# Patient Record
Sex: MALE | Race: WHITE | URBAN - METROPOLITAN AREA
[De-identification: names, ages, dates, MRNs, and addresses within clinical notes are randomized per-mention and may not be internally consistent; named-entity substitution may affect disease eponyms.]

---

## 2017-01-01 ENCOUNTER — HOSPITAL ENCOUNTER (OUTPATIENT)
Dept: CT IMAGING | Facility: CLINIC | Age: 77
Discharge: HOME | End: 2017-11-14
Attending: INTERNAL MEDICINE | Admitting: INTERNAL MEDICINE
Payer: COMMERCIAL

## 2017-01-01 ENCOUNTER — HOSPITAL ENCOUNTER (OUTPATIENT)
Facility: CLINIC | Age: 77
Setting detail: SPECIMEN
Discharge: HOME | End: 2017-11-15
Attending: INTERNAL MEDICINE | Admitting: INTERNAL MEDICINE
Payer: COMMERCIAL

## 2017-01-01 ENCOUNTER — ONCOLOGY VISIT (OUTPATIENT)
Dept: ONCOLOGY | Facility: CLINIC | Age: 77
End: 2017-01-01
Attending: INTERNAL MEDICINE
Payer: COMMERCIAL

## 2017-01-01 ENCOUNTER — OFFICE VISIT (OUTPATIENT)
Dept: FAMILY MEDICINE | Facility: CLINIC | Age: 77
End: 2017-01-01
Payer: COMMERCIAL

## 2017-01-01 ENCOUNTER — OFFICE VISIT (OUTPATIENT)
Dept: NEUROLOGY | Facility: CLINIC | Age: 77
End: 2017-01-01
Payer: COMMERCIAL

## 2017-01-01 ENCOUNTER — TELEPHONE (OUTPATIENT)
Dept: ONCOLOGY | Facility: CLINIC | Age: 77
End: 2017-01-01

## 2017-01-01 ENCOUNTER — TELEPHONE (OUTPATIENT)
Dept: NEUROLOGY | Facility: CLINIC | Age: 77
End: 2017-01-01

## 2017-01-01 ENCOUNTER — TELEPHONE (OUTPATIENT)
Dept: FAMILY MEDICINE | Facility: CLINIC | Age: 77
End: 2017-01-01

## 2017-01-01 VITALS
SYSTOLIC BLOOD PRESSURE: 101 MMHG | BODY MASS INDEX: 24.77 KG/M2 | WEIGHT: 193 LBS | DIASTOLIC BLOOD PRESSURE: 68 MMHG | HEART RATE: 75 BPM | HEIGHT: 74 IN | TEMPERATURE: 95.2 F | OXYGEN SATURATION: 99 %

## 2017-01-01 VITALS
WEIGHT: 196 LBS | TEMPERATURE: 97.1 F | HEART RATE: 95 BPM | OXYGEN SATURATION: 98 % | HEIGHT: 74 IN | DIASTOLIC BLOOD PRESSURE: 74 MMHG | BODY MASS INDEX: 25.15 KG/M2 | SYSTOLIC BLOOD PRESSURE: 113 MMHG

## 2017-01-01 VITALS
WEIGHT: 192.3 LBS | BODY MASS INDEX: 24.68 KG/M2 | HEART RATE: 103 BPM | TEMPERATURE: 97.5 F | HEIGHT: 74 IN | SYSTOLIC BLOOD PRESSURE: 80 MMHG | OXYGEN SATURATION: 98 % | DIASTOLIC BLOOD PRESSURE: 50 MMHG

## 2017-01-01 VITALS
DIASTOLIC BLOOD PRESSURE: 80 MMHG | WEIGHT: 193 LBS | BODY MASS INDEX: 24.77 KG/M2 | HEART RATE: 89 BPM | OXYGEN SATURATION: 96 % | HEIGHT: 74 IN | TEMPERATURE: 98.5 F | SYSTOLIC BLOOD PRESSURE: 118 MMHG

## 2017-01-01 VITALS
TEMPERATURE: 97.7 F | OXYGEN SATURATION: 96 % | BODY MASS INDEX: 25.28 KG/M2 | HEART RATE: 92 BPM | WEIGHT: 197 LBS | DIASTOLIC BLOOD PRESSURE: 72 MMHG | SYSTOLIC BLOOD PRESSURE: 113 MMHG | HEIGHT: 74 IN

## 2017-01-01 VITALS
HEART RATE: 91 BPM | WEIGHT: 193 LBS | OXYGEN SATURATION: 97 % | BODY MASS INDEX: 24.78 KG/M2 | SYSTOLIC BLOOD PRESSURE: 136 MMHG | RESPIRATION RATE: 18 BRPM | TEMPERATURE: 97.8 F | DIASTOLIC BLOOD PRESSURE: 89 MMHG

## 2017-01-01 VITALS
DIASTOLIC BLOOD PRESSURE: 82 MMHG | BODY MASS INDEX: 25.28 KG/M2 | WEIGHT: 197 LBS | HEART RATE: 92 BPM | SYSTOLIC BLOOD PRESSURE: 120 MMHG | TEMPERATURE: 97.9 F | HEIGHT: 74 IN | RESPIRATION RATE: 18 BRPM | OXYGEN SATURATION: 99 %

## 2017-01-01 DIAGNOSIS — G62.9 PERIPHERAL POLYNEUROPATHY: Primary | ICD-10-CM

## 2017-01-01 DIAGNOSIS — C90.00 MULTIPLE MYELOMA, REMISSION STATUS UNSPECIFIED (H): Primary | ICD-10-CM

## 2017-01-01 DIAGNOSIS — I10 ESSENTIAL HYPERTENSION, BENIGN: ICD-10-CM

## 2017-01-01 DIAGNOSIS — G60.9 IDIOPATHIC PERIPHERAL NEUROPATHY: ICD-10-CM

## 2017-01-01 DIAGNOSIS — D47.2 MGUS (MONOCLONAL GAMMOPATHY OF UNKNOWN SIGNIFICANCE): ICD-10-CM

## 2017-01-01 DIAGNOSIS — R63.0 DECREASED APPETITE: ICD-10-CM

## 2017-01-01 DIAGNOSIS — M79.672 BILATERAL FOOT PAIN: ICD-10-CM

## 2017-01-01 DIAGNOSIS — D64.9 ANEMIA, UNSPECIFIED TYPE: ICD-10-CM

## 2017-01-01 DIAGNOSIS — R70.0 ELEVATED ERYTHROCYTE SEDIMENTATION RATE: Primary | ICD-10-CM

## 2017-01-01 DIAGNOSIS — N40.0 PROSTATE ENLARGEMENT: ICD-10-CM

## 2017-01-01 DIAGNOSIS — E78.2 MIXED HYPERLIPIDEMIA: ICD-10-CM

## 2017-01-01 DIAGNOSIS — R97.20 ELEVATED PROSTATE SPECIFIC ANTIGEN (PSA): ICD-10-CM

## 2017-01-01 DIAGNOSIS — D64.9 ANEMIA, UNSPECIFIED TYPE: Primary | ICD-10-CM

## 2017-01-01 DIAGNOSIS — R20.0 NUMBNESS IN FEET: Primary | ICD-10-CM

## 2017-01-01 DIAGNOSIS — E53.1 VITAMIN B6 DEFICIENCY: ICD-10-CM

## 2017-01-01 DIAGNOSIS — D47.2 MGUS (MONOCLONAL GAMMOPATHY OF UNKNOWN SIGNIFICANCE): Primary | ICD-10-CM

## 2017-01-01 DIAGNOSIS — M79.671 BILATERAL FOOT PAIN: ICD-10-CM

## 2017-01-01 DIAGNOSIS — I95.2 HYPOTENSION DUE TO DRUGS: ICD-10-CM

## 2017-01-01 DIAGNOSIS — N18.30 CKD (CHRONIC KIDNEY DISEASE) STAGE 3, GFR 30-59 ML/MIN (H): ICD-10-CM

## 2017-01-01 DIAGNOSIS — R70.0 ELEVATED ERYTHROCYTE SEDIMENTATION RATE: ICD-10-CM

## 2017-01-01 DIAGNOSIS — F33.9 EPISODE OF RECURRENT MAJOR DEPRESSIVE DISORDER, UNSPECIFIED DEPRESSION EPISODE SEVERITY (H): Primary | ICD-10-CM

## 2017-01-01 DIAGNOSIS — F33.9 EPISODE OF RECURRENT MAJOR DEPRESSIVE DISORDER, UNSPECIFIED DEPRESSION EPISODE SEVERITY (H): ICD-10-CM

## 2017-01-01 DIAGNOSIS — R53.82 CHRONIC FATIGUE: Primary | ICD-10-CM

## 2017-01-01 LAB
A-TOCOPHEROL VIT E SERPL-MCNC: 9.4 MG/L (ref 5.5–18)
ALBUMIN MFR UR ELPH: 58.2 %
ALBUMIN SERPL ELPH-MCNC: 3.9 G/DL (ref 3.7–5.1)
ALBUMIN SERPL-MCNC: 2.8 G/DL (ref 3.4–5)
ALP SERPL-CCNC: 63 U/L (ref 40–150)
ALPHA1 GLOB MFR UR ELPH: 3.1 %
ALPHA1 GLOB SERPL ELPH-MCNC: 0.3 G/DL (ref 0.2–0.4)
ALPHA2 GLOB MFR UR ELPH: 4.7 %
ALPHA2 GLOB SERPL ELPH-MCNC: 0.8 G/DL (ref 0.5–0.9)
ALT SERPL W P-5'-P-CCNC: 50 U/L (ref 0–70)
ANA SER QL IF: NEGATIVE
ANION GAP SERPL CALCULATED.3IONS-SCNC: 9 MMOL/L (ref 3–14)
AST SERPL W P-5'-P-CCNC: 19 U/L (ref 0–45)
B BURGDOR IGG+IGM SER QL: <0.01 (ref 0–0.89)
B-GLOBULIN MFR UR ELPH: 4.9 %
B-GLOBULIN SERPL ELPH-MCNC: 0.6 G/DL (ref 0.6–1)
BASOPHILS # BLD AUTO: 0 10E9/L (ref 0–0.2)
BASOPHILS # BLD AUTO: 0 10E9/L (ref 0–0.2)
BASOPHILS NFR BLD AUTO: 0 %
BASOPHILS NFR BLD AUTO: 0.2 %
BETA+GAMMA TOCOPHEROL SERPL-MCNC: 0.5 MG/L (ref 0–6)
BILIRUB SERPL-MCNC: 0.4 MG/DL (ref 0.2–1.3)
BUN SERPL-MCNC: 33 MG/DL (ref 7–30)
CALCIUM SERPL-MCNC: 9.3 MG/DL (ref 8.5–10.1)
CHLORIDE SERPL-SCNC: 105 MMOL/L (ref 94–109)
CO2 SERPL-SCNC: 23 MMOL/L (ref 20–32)
CREAT SERPL-MCNC: 1.45 MG/DL (ref 0.66–1.25)
CREAT SERPL-MCNC: 1.69 MG/DL (ref 0.66–1.25)
DIFFERENTIAL METHOD BLD: ABNORMAL
DIFFERENTIAL METHOD BLD: ABNORMAL
EOSINOPHIL # BLD AUTO: 0.2 10E9/L (ref 0–0.7)
EOSINOPHIL # BLD AUTO: 0.2 10E9/L (ref 0–0.7)
EOSINOPHIL NFR BLD AUTO: 3.7 %
EOSINOPHIL NFR BLD AUTO: 3.9 %
ERYTHROCYTE [DISTWIDTH] IN BLOOD BY AUTOMATED COUNT: 13.5 % (ref 10–15)
ERYTHROCYTE [DISTWIDTH] IN BLOOD BY AUTOMATED COUNT: 13.7 % (ref 10–15)
ERYTHROCYTE [SEDIMENTATION RATE] IN BLOOD BY WESTERGREN METHOD: 83 MM/H (ref 0–20)
FERRITIN SERPL-MCNC: 237 NG/ML (ref 26–388)
FOLATE SERPL-MCNC: 15.5 NG/ML
GAMMA GLOB MFR UR ELPH: 29.1 %
GAMMA GLOB SERPL ELPH-MCNC: 4.2 G/DL (ref 0.7–1.6)
GFR SERPL CREATININE-BSD FRML MDRD: 39 ML/MIN/1.7M2
GFR SERPL CREATININE-BSD FRML MDRD: 47 ML/MIN/1.7M2
GLUCOSE SERPL-MCNC: 121 MG/DL (ref 70–99)
HAPTOGLOB SERPL-MCNC: 146 MG/DL (ref 35–175)
HBA1C MFR BLD: 5.9 % (ref 4.3–6)
HCT VFR BLD AUTO: 34.7 % (ref 40–53)
HCT VFR BLD AUTO: 35 % (ref 40–53)
HGB BLD-MCNC: 11.6 G/DL (ref 13.3–17.7)
HGB BLD-MCNC: 11.7 G/DL (ref 13.3–17.7)
IGA SERPL-MCNC: 7 MG/DL (ref 70–380)
IGG SERPL-MCNC: 5110 MG/DL (ref 695–1620)
IGM SERPL-MCNC: 6 MG/DL (ref 60–265)
IRON SATN MFR SERPL: 30 % (ref 15–46)
IRON SERPL-MCNC: 78 UG/DL (ref 35–180)
KAPPA LC UR-MCNC: 0.4 MG/DL (ref 0.33–1.94)
KAPPA LC/LAMBDA SER: 0.04 {RATIO} (ref 0.26–1.65)
LAMBDA LC SERPL-MCNC: 9.25 MG/DL (ref 0.57–2.63)
LDH SERPL L TO P-CCNC: 145 U/L (ref 85–227)
LYMPHOCYTES # BLD AUTO: 1.3 10E9/L (ref 0.8–5.3)
LYMPHOCYTES # BLD AUTO: 1.8 10E9/L (ref 0.8–5.3)
LYMPHOCYTES NFR BLD AUTO: 27.6 %
LYMPHOCYTES NFR BLD AUTO: 40.1 %
M PROTEIN MFR UR ELPH: 21 %
M PROTEIN SERPL ELPH-MCNC: 4 G/DL
MCH RBC QN AUTO: 30.9 PG (ref 26.5–33)
MCH RBC QN AUTO: 31.1 PG (ref 26.5–33)
MCHC RBC AUTO-ENTMCNC: 33.4 G/DL (ref 31.5–36.5)
MCHC RBC AUTO-ENTMCNC: 33.4 G/DL (ref 31.5–36.5)
MCV RBC AUTO: 92 FL (ref 78–100)
MCV RBC AUTO: 93 FL (ref 78–100)
MONOCYTES # BLD AUTO: 0.3 10E9/L (ref 0–1.3)
MONOCYTES # BLD AUTO: 0.3 10E9/L (ref 0–1.3)
MONOCYTES NFR BLD AUTO: 5.9 %
MONOCYTES NFR BLD AUTO: 6.4 %
NEUTROPHILS # BLD AUTO: 2.3 10E9/L (ref 1.6–8.3)
NEUTROPHILS # BLD AUTO: 3 10E9/L (ref 1.6–8.3)
NEUTROPHILS NFR BLD AUTO: 50.1 %
NEUTROPHILS NFR BLD AUTO: 62.1 %
PLATELET # BLD AUTO: 126 10E9/L (ref 150–450)
PLATELET # BLD AUTO: 141 10E9/L (ref 150–450)
POTASSIUM SERPL-SCNC: 3.6 MMOL/L (ref 3.4–5.3)
PROT ELPH PNL UR ELPH: NORMAL
PROT PATTERN SERPL ELPH-IMP: ABNORMAL
PROT PATTERN SERPL IFE-IMP: ABNORMAL
PROT PATTERN UR ELPH-IMP: ABNORMAL
PROT SERPL-MCNC: 10.2 G/DL (ref 6.8–8.8)
PSA SERPL-MCNC: 10.4 UG/L (ref 0–4)
RBC # BLD AUTO: 3.73 10E12/L (ref 4.4–5.9)
RBC # BLD AUTO: 3.79 10E12/L (ref 4.4–5.9)
SODIUM SERPL-SCNC: 137 MMOL/L (ref 133–144)
TIBC SERPL-MCNC: 262 UG/DL (ref 240–430)
TSH SERPL DL<=0.005 MIU/L-ACNC: 1.56 MU/L (ref 0.4–4)
VIT B1 BLD-MCNC: 106 NMOL/L (ref 70–180)
VIT B12 SERPL-MCNC: 1378 PG/ML (ref 193–986)
VIT B6 SERPL-MCNC: 14.9 NMOL/L (ref 20–125)
WBC # BLD AUTO: 4.6 10E9/L (ref 4–11)
WBC # BLD AUTO: 4.9 10E9/L (ref 4–11)

## 2017-01-01 PROCEDURE — 99000 SPECIMEN HANDLING OFFICE-LAB: CPT | Performed by: PSYCHIATRY & NEUROLOGY

## 2017-01-01 PROCEDURE — 84425 ASSAY OF VITAMIN B-1: CPT | Mod: 90 | Performed by: PSYCHIATRY & NEUROLOGY

## 2017-01-01 PROCEDURE — 84165 PROTEIN E-PHORESIS SERUM: CPT | Performed by: PSYCHIATRY & NEUROLOGY

## 2017-01-01 PROCEDURE — 82728 ASSAY OF FERRITIN: CPT | Performed by: NURSE PRACTITIONER

## 2017-01-01 PROCEDURE — 84446 ASSAY OF VITAMIN E: CPT | Mod: 90 | Performed by: PSYCHIATRY & NEUROLOGY

## 2017-01-01 PROCEDURE — 84166 PROTEIN E-PHORESIS/URINE/CSF: CPT | Performed by: PSYCHIATRY & NEUROLOGY

## 2017-01-01 PROCEDURE — 85652 RBC SED RATE AUTOMATED: CPT | Performed by: PSYCHIATRY & NEUROLOGY

## 2017-01-01 PROCEDURE — 99211 OFF/OP EST MAY X REQ PHY/QHP: CPT

## 2017-01-01 PROCEDURE — 36415 COLL VENOUS BLD VENIPUNCTURE: CPT

## 2017-01-01 PROCEDURE — 82565 ASSAY OF CREATININE: CPT | Performed by: INTERNAL MEDICINE

## 2017-01-01 PROCEDURE — 99214 OFFICE O/P EST MOD 30 MIN: CPT | Performed by: INTERNAL MEDICINE

## 2017-01-01 PROCEDURE — 84153 ASSAY OF PSA TOTAL: CPT | Performed by: INTERNAL MEDICINE

## 2017-01-01 PROCEDURE — 84207 ASSAY OF VITAMIN B-6: CPT | Mod: 90 | Performed by: PSYCHIATRY & NEUROLOGY

## 2017-01-01 PROCEDURE — 85025 COMPLETE CBC W/AUTO DIFF WBC: CPT | Performed by: INTERNAL MEDICINE

## 2017-01-01 PROCEDURE — 82784 ASSAY IGA/IGD/IGG/IGM EACH: CPT | Performed by: PSYCHIATRY & NEUROLOGY

## 2017-01-01 PROCEDURE — 80053 COMPREHEN METABOLIC PANEL: CPT | Performed by: NURSE PRACTITIONER

## 2017-01-01 PROCEDURE — 99204 OFFICE O/P NEW MOD 45 MIN: CPT | Performed by: INTERNAL MEDICINE

## 2017-01-01 PROCEDURE — 83010 ASSAY OF HAPTOGLOBIN QUANT: CPT | Performed by: NURSE PRACTITIONER

## 2017-01-01 PROCEDURE — 86618 LYME DISEASE ANTIBODY: CPT | Performed by: PSYCHIATRY & NEUROLOGY

## 2017-01-01 PROCEDURE — 00000402 ZZHCL STATISTIC TOTAL PROTEIN: Performed by: PSYCHIATRY & NEUROLOGY

## 2017-01-01 PROCEDURE — 84443 ASSAY THYROID STIM HORMONE: CPT | Performed by: INTERNAL MEDICINE

## 2017-01-01 PROCEDURE — 36415 COLL VENOUS BLD VENIPUNCTURE: CPT | Performed by: INTERNAL MEDICINE

## 2017-01-01 PROCEDURE — 86335 IMMUNFIX E-PHORSIS/URINE/CSF: CPT | Performed by: PSYCHIATRY & NEUROLOGY

## 2017-01-01 PROCEDURE — 83550 IRON BINDING TEST: CPT | Performed by: NURSE PRACTITIONER

## 2017-01-01 PROCEDURE — 86038 ANTINUCLEAR ANTIBODIES: CPT | Performed by: PSYCHIATRY & NEUROLOGY

## 2017-01-01 PROCEDURE — 83883 ASSAY NEPHELOMETRY NOT SPEC: CPT | Performed by: INTERNAL MEDICINE

## 2017-01-01 PROCEDURE — 83615 LACTATE (LD) (LDH) ENZYME: CPT | Performed by: NURSE PRACTITIONER

## 2017-01-01 PROCEDURE — 74176 CT ABD & PELVIS W/O CONTRAST: CPT

## 2017-01-01 PROCEDURE — 86334 IMMUNOFIX E-PHORESIS SERUM: CPT | Performed by: PSYCHIATRY & NEUROLOGY

## 2017-01-01 PROCEDURE — 82746 ASSAY OF FOLIC ACID SERUM: CPT | Performed by: INTERNAL MEDICINE

## 2017-01-01 PROCEDURE — 36415 COLL VENOUS BLD VENIPUNCTURE: CPT | Performed by: NURSE PRACTITIONER

## 2017-01-01 PROCEDURE — 99214 OFFICE O/P EST MOD 30 MIN: CPT | Performed by: NURSE PRACTITIONER

## 2017-01-01 PROCEDURE — 85025 COMPLETE CBC W/AUTO DIFF WBC: CPT | Performed by: NURSE PRACTITIONER

## 2017-01-01 PROCEDURE — 99205 OFFICE O/P NEW HI 60 MIN: CPT | Performed by: PSYCHIATRY & NEUROLOGY

## 2017-01-01 PROCEDURE — 82607 VITAMIN B-12: CPT | Performed by: INTERNAL MEDICINE

## 2017-01-01 PROCEDURE — 36415 COLL VENOUS BLD VENIPUNCTURE: CPT | Performed by: PSYCHIATRY & NEUROLOGY

## 2017-01-01 PROCEDURE — 83036 HEMOGLOBIN GLYCOSYLATED A1C: CPT | Performed by: PSYCHIATRY & NEUROLOGY

## 2017-01-01 PROCEDURE — 83540 ASSAY OF IRON: CPT | Performed by: NURSE PRACTITIONER

## 2017-01-01 RX ORDER — MIRTAZAPINE 30 MG/1
30 TABLET, FILM COATED ORAL AT BEDTIME
Qty: 90 TABLET | Refills: 3 | Status: SHIPPED | OUTPATIENT
Start: 2017-01-01 | End: 2017-01-01

## 2017-01-01 RX ORDER — LOSARTAN POTASSIUM 25 MG/1
25 TABLET ORAL DAILY
Qty: 30 TABLET | Refills: 11
Start: 2017-01-01 | End: 2017-01-01

## 2017-01-01 ASSESSMENT — PAIN SCALES - GENERAL: PAINLEVEL: MODERATE PAIN (5)

## 2017-01-01 ASSESSMENT — PATIENT HEALTH QUESTIONNAIRE - PHQ9: SUM OF ALL RESPONSES TO PHQ QUESTIONS 1-9: 15

## 2017-02-15 ENCOUNTER — DOCUMENTATION ONLY (OUTPATIENT)
Dept: CARDIOLOGY | Facility: CLINIC | Age: 77
End: 2017-02-15

## 2017-02-15 NOTE — LETTER
February 15, 2017       TO: Guille Aguilar  [address]  [place] [address]       Dear Guille Aguilar,    We are reviewing our outstanding orders.    Dr. Washington has ordered an office visit and lab work with his assistant for follow up.  You missed your appointment last week.     Please contact the scheduling desk at [phone] to arranged for an appointment.     If you have any questions, please call the Team 2 nurse phone @ [phone]. If you had your lab work done at another facility, please give us a call so we can locate the results.     Thank you  Team 2 nurses

## 2017-02-27 ENCOUNTER — OFFICE VISIT (OUTPATIENT)
Dept: FAMILY MEDICINE | Facility: CLINIC | Age: 77
End: 2017-02-27
Payer: COMMERCIAL

## 2017-02-27 VITALS
DIASTOLIC BLOOD PRESSURE: 64 MMHG | HEIGHT: 74 IN | OXYGEN SATURATION: 99 % | BODY MASS INDEX: 28.23 KG/M2 | HEART RATE: 76 BPM | SYSTOLIC BLOOD PRESSURE: 118 MMHG | WEIGHT: 220 LBS | TEMPERATURE: 97.7 F

## 2017-02-27 DIAGNOSIS — H69.93 ETD (EUSTACHIAN TUBE DYSFUNCTION), BILATERAL: ICD-10-CM

## 2017-02-27 DIAGNOSIS — M79.671 PAIN IN BOTH FEET: ICD-10-CM

## 2017-02-27 DIAGNOSIS — H91.93 HEARING DECREASED, BILATERAL: Primary | ICD-10-CM

## 2017-02-27 DIAGNOSIS — M79.672 PAIN IN BOTH FEET: ICD-10-CM

## 2017-02-27 PROCEDURE — 99214 OFFICE O/P EST MOD 30 MIN: CPT | Performed by: FAMILY MEDICINE

## 2017-02-27 RX ORDER — FLUTICASONE PROPIONATE 50 MCG
1-2 SPRAY, SUSPENSION (ML) NASAL DAILY
Qty: 16 G | Status: SHIPPED | OUTPATIENT
Start: 2017-02-27 | End: 2017-01-01

## 2017-02-27 NOTE — NURSING NOTE
"Chief Complaint   Patient presents with     Ear Problem       Initial /64  Pulse 76  Temp 97.7  F (36.5  C) (Tympanic)  Ht 6' 2\" (1.88 m)  Wt 220 lb (99.8 kg)  SpO2 99%  BMI 28.25 kg/m2 Estimated body mass index is 28.25 kg/(m^2) as calculated from the following:    Height as of this encounter: 6' 2\" (1.88 m).    Weight as of this encounter: 220 lb (99.8 kg).  Medication Reconciliation: complete     Lidya Shah CMA      "

## 2017-02-27 NOTE — MR AVS SNAPSHOT
After Visit Summary   2/27/2017    Guille Aguilar    MRN: [id number]           Patient Information     Date Of Birth          1940        Visit Information        Provider Department      2/27/2017 1:30 PM Zuleyka Wick MD [facility] [place]        Today's Diagnoses     Hearing decreased, bilateral    -  1    ETD (eustachian tube dysfunction), bilateral        Pain in both feet          Care Instructions    Cares and symptomatic treatment discussed follow up if problem   Referral given to ENT.   Also may go back to see podiatrist if any ongoing feet problem               Follow-ups after your visit        Additional Services     OTOLARYNGOLOGY REFERRAL       Your provider has referred you to: UM: [facility] [place] [phone]   [email or web address]  UMP: [facility] [phone]   [email or web address]  N: [facility] [place] [phone]   [email or web address]    Please be aware that coverage of these services is subject to the terms and limitations of your health insurance plan.  Call member services at your health plan with any benefit or coverage questions.      Please bring the following with you to your appointment:    (1) Any X-Rays, CTs or MRIs which have been performed.  Contact the facility where they were done to arrange for  prior to your scheduled appointment.   (2) List of current medications  (3) This referral request   (4) Any documents/labs given to you for this referral                  Who to contact     If you have questions or need follow up information about today's clinic visit or your schedule please contact [facility] [place] directly at [phone].  Normal or non-critical lab  "and imaging results will be communicated to you by MyChart, letter or phone within 4 business days after the clinic has received the results. If you do not hear from us within 7 days, please contact the clinic through [employer] or phone. If you have a critical or abnormal lab result, we will notify you by phone as soon as possible.  Submit refill requests through App[employer] or call your pharmacy and they will forward the refill request to us. Please allow 3 business days for your refill to be completed.          Additional Information About Your Visit        [employer] Information     AppSense lets you send messages to your doctor, view your test results, renew your prescriptions, schedule appointments and more. To sign up, go to [email or web address] . Click on \"Log in\" on the left side of the screen, which will take you to the Welcome page. Then click on \"Sign up Now\" on the right side of the page.     You will be asked to enter the access code listed below, as well as some personal information. Please follow the directions to create your username and password.     Your access code is: [email or web address]  Expires: 2017  2:02 PM     Your access code will  in 90 days. If you need help or a new code, please call your [place] clinic or [phone].        Care EveryWhere ID     This is your Care EveryWhere ID. This could be used by other organizations to access your [place] medical records  [id number]        Your Vitals Were     Pulse Temperature Height Pulse Oximetry BMI (Body Mass Index)       76 97.7  F (36.5  C) (Tympanic) 6' 2\" (1.88 m) 99% 28.25 kg/m2        Blood Pressure from Last 3 Encounters:   17 118/64   16 128/84   10/04/16 131/84    Weight from Last 3 Encounters:   17 220 lb (99.8 kg)   16 228 lb (103.4 kg)   10/04/16 232 lb 3.2 oz (105.3 kg)              We Performed the Following     OTOLARYNGOLOGY REFERRAL          Today's Medication Changes          These changes are " accurate as of: 2/27/17  2:02 PM.  If you have any questions, ask your nurse or doctor.               Start taking these medicines.        Dose/Directions    fluticasone 50 MCG/ACT spray   Commonly known as:  FLONASE   Used for:  ETD (eustachian tube dysfunction), bilateral, Hearing decreased, bilateral   Started by:  Zuleyka Wick MD        Dose:  1-2 spray   Spray 1-2 sprays into both nostrils daily   Quantity:  16 g   Refills:  prn            Where to get your medicines      These medications were sent to [facility] - [place], [place] - [address] AT [address]  [address] [address]     Phone:  [phone]     fluticasone 50 MCG/ACT spray                Primary Care Provider Office Phone # Fax #    Magdaleno Cleveland [phone]       [facility] [address]  [place] [address]        Thank you!     Thank you for choosing [facility]  for your care. Our goal is always to provide you with excellent care. Hearing back from our patients is one way we can continue to improve our services. Please take a few minutes to complete the written survey that you may receive in the mail after your visit with us. Thank you!             Your Updated Medication List - Protect others around you: Learn how to safely use, store and throw away your medicines at www.disposemymeds.org.          This list is accurate as of: 2/27/17  2:02 PM.  Always use your most recent med list.                   Brand Name Dispense Instructions for use    aspirin 81 MG tablet      1 TABLET DAILY       atorvastatin 80 MG tablet    LIPITOR    90 tablet    Take 1 tablet (80 mg) by mouth At Bedtime       coenzyme Q-10 100 MG Caps      Take by mouth daily       fluticasone 50 MCG/ACT spray    FLONASE    16 g    Spray 1-2 sprays into both nostrils daily       hydrochlorothiazide 25 MG tablet    HYDRODIURIL    45  tablet    Take 0.5 tablets (12.5 mg) by mouth daily       losartan 50 MG tablet    COZAAR    180 tablet    Take 1 tablet (50 mg) by mouth 2 times daily       metoprolol 50 MG tablet    LOPRESSOR    180 tablet    Take 1 tablet (50 mg) by mouth 2 times daily       NITRO-DUR 0.4 MG/HR 24 hr patch   Generic drug:  nitroglycerin      Place 1 patch onto the skin as needed.       XALATAN 0.005 % ophthalmic solution   Generic drug:  latanoprost      1 drop left eye once daily

## 2017-02-27 NOTE — PROGRESS NOTES
"  SUBJECTIVE:                                                    Guille Aguilar is a 76 year old male who presents to clinic today for the following health issues:      Concern - ears plugged     Onset: x 3 days    Description:     Not painful, can't hear, 'sounds liek wind blowing no ringing \"    Intensity: mild    Progression of Symptoms:  Same     Accompanying Signs & Symptoms: no other uri sx , except  just ears feel plugged, hearing feel muffled just last few days          Previous history of similar problem:   No     Precipitating factors:   Worsened by: none     Alleviating factors:  Improved by: na        Therapies Tried and outcome:       Joint or Musculoskeletal Pain.   Feet/ ankle , feeling pain coming back lately   Has seen podiatrist and therapist in the past it helped some  , but sometimes feels sharp pain back of heel goes to leg, no numbness or tingling. He takes aspirin sometimes and it helps         Problem list and histories reviewed & adjusted, as indicated.  Additional history: as documented    Problem list, Medication list, Allergies, and Medical/Social/Surgical histories reviewed in EPIC and updated as appropriate.    ROS:  Constitutional, HEENT, cardiovascular, pulmonary, GI, , musculoskeletal, neuro, skin, endocrine and psych systems are negative, except as otherwise noted.    OBJECTIVE:                                                    /64  Pulse 76  Temp 97.7  F (36.5  C) (Tympanic)  Ht 6' 2\" (1.88 m)  Wt 220 lb (99.8 kg)  SpO2 99%  BMI 28.25 kg/m2  Body mass index is 28.25 kg/(m^2).  GENERAL: healthy, alert and no distress  EYES: Eyes grossly normal to inspection,conjunctivae and sclerae normal  HENT: ear canals and TM's normal, nasal minimally swollen turbinates and clear  rhinorrhea . No sinus tenderness. oropharynx clear and oral mucous membranes moist  NECK: no adenopathy,  RESP: lungs clear to auscultation - no rales, rhonchi or wheezes  CV: regular rate and rhythm, normal " S1 S2, no S3 or S4,   MS: feet / ankle with normal range of motion, no edema and tenderness to palpation along achillis or medial arch at this time   NEURO: Normal strength and tone, mentation intact and speech normal       ASSESSMENT/PLAN:                                                        (H91.93) Hearing decreased, bilateral  (primary encounter diagnosis)  Comment: only past few days , ? ringing likely ETD   Plan: fluticasone (FLONASE) 50 MCG/ACT spray,         OTOLARYNGOLOGY REFERRAL            (H69.83) ETD (eustachian tube dysfunction), bilateral  Comment:   Plan: fluticasone (FLONASE) 50 MCG/ACT spray,         OTOLARYNGOLOGY REFERRAL        Discussed possible differential diagnosis for his symptoms. He is willing to try Flonase to see if helps. If no improvement or ongoing problem, he will consider seeing ENT. Referral given . F/u here as needed       (M79.671,  M79.672) Pain in both feet  Comment: previous hx of planter faucitis/ achillis strain   Plan:   Discussed feet cares. Talked about comfortable shoes, orthotics to support etc. He is willing to try just OTC med's for pain although reminded him not to take too much aspirin bc of gi s/e risk etc. Pros/ cons of med's discussed,  if ongoing problem, he follow up with his podiatry, as needed. He will follow up here as needed        Patient expressed understanding and agreement with treatment plan. All patient's questions were answered, will let me know if has more later.  Medications: Rx's: Reviewed the potential side effects/complications of medications prescribed.       Zuleyka Wick MD  [facility]

## 2017-02-27 NOTE — PATIENT INSTRUCTIONS
Cares and symptomatic treatment discussed follow up if problem   Referral given to ENT.   Also may go back to see podiatrist if any ongoing feet problem

## 2017-04-10 ENCOUNTER — TELEPHONE (OUTPATIENT)
Dept: OTHER | Facility: CLINIC | Age: 77
End: 2017-04-10

## 2017-04-10 ENCOUNTER — TELEPHONE (OUTPATIENT)
Dept: CARDIOLOGY | Facility: CLINIC | Age: 77
End: 2017-04-10

## 2017-04-10 NOTE — TELEPHONE ENCOUNTER
"Patient called requesting an OV with Dr. Washington for annual review and medication refills. Patient states he missed his OV in Feb with Dr. Washington. Patient also wants to discuss \"neuropathy on his right leg\". Patient transferred to scheduling for next available OV .   "

## 2017-05-09 ENCOUNTER — PRE VISIT (OUTPATIENT)
Dept: CARDIOLOGY | Facility: CLINIC | Age: 77
End: 2017-05-09

## 2017-05-26 DIAGNOSIS — I25.10 CORONARY ARTERY DISEASE INVOLVING NATIVE CORONARY ARTERY OF NATIVE HEART WITHOUT ANGINA PECTORIS: ICD-10-CM

## 2017-05-26 LAB
ANION GAP SERPL CALCULATED.3IONS-SCNC: 7 MMOL/L (ref 3–14)
BUN SERPL-MCNC: 27 MG/DL (ref 7–30)
CALCIUM SERPL-MCNC: 8.6 MG/DL (ref 8.5–10.1)
CHLORIDE SERPL-SCNC: 105 MMOL/L (ref 94–109)
CHOLEST SERPL-MCNC: 181 MG/DL
CO2 SERPL-SCNC: 25 MMOL/L (ref 20–32)
CREAT SERPL-MCNC: 1.16 MG/DL (ref 0.66–1.25)
GFR SERPL CREATININE-BSD FRML MDRD: 61 ML/MIN/1.7M2
GLUCOSE SERPL-MCNC: 95 MG/DL (ref 70–99)
HDLC SERPL-MCNC: 26 MG/DL
LDLC SERPL CALC-MCNC: 118 MG/DL
NONHDLC SERPL-MCNC: 155 MG/DL
POTASSIUM SERPL-SCNC: 4.1 MMOL/L (ref 3.4–5.3)
SODIUM SERPL-SCNC: 137 MMOL/L (ref 133–144)
TRIGL SERPL-MCNC: 185 MG/DL

## 2017-05-26 PROCEDURE — 80061 LIPID PANEL: CPT | Performed by: INTERNAL MEDICINE

## 2017-05-26 PROCEDURE — 36415 COLL VENOUS BLD VENIPUNCTURE: CPT | Performed by: INTERNAL MEDICINE

## 2017-05-26 PROCEDURE — 80048 BASIC METABOLIC PNL TOTAL CA: CPT | Performed by: INTERNAL MEDICINE

## 2017-05-30 ENCOUNTER — OFFICE VISIT (OUTPATIENT)
Dept: CARDIOLOGY | Facility: CLINIC | Age: 77
End: 2017-05-30
Payer: COMMERCIAL

## 2017-05-30 VITALS
DIASTOLIC BLOOD PRESSURE: 86 MMHG | SYSTOLIC BLOOD PRESSURE: 148 MMHG | HEIGHT: 74 IN | HEART RATE: 76 BPM | BODY MASS INDEX: 26.69 KG/M2 | WEIGHT: 208 LBS

## 2017-05-30 DIAGNOSIS — M79.672 PAIN IN BOTH FEET: ICD-10-CM

## 2017-05-30 DIAGNOSIS — Z91.148 NONCOMPLIANCE WITH MEDICATION REGIMEN: ICD-10-CM

## 2017-05-30 DIAGNOSIS — E78.6 HDL DEFICIENCY: Chronic | ICD-10-CM

## 2017-05-30 DIAGNOSIS — I10 ESSENTIAL HYPERTENSION, BENIGN: ICD-10-CM

## 2017-05-30 DIAGNOSIS — M79.671 PAIN IN BOTH FEET: ICD-10-CM

## 2017-05-30 DIAGNOSIS — I25.10 CORONARY ARTERY DISEASE INVOLVING NATIVE CORONARY ARTERY OF NATIVE HEART WITHOUT ANGINA PECTORIS: Primary | ICD-10-CM

## 2017-05-30 DIAGNOSIS — E78.2 MIXED HYPERLIPIDEMIA: ICD-10-CM

## 2017-05-30 PROCEDURE — 99214 OFFICE O/P EST MOD 30 MIN: CPT | Performed by: INTERNAL MEDICINE

## 2017-05-30 RX ORDER — METOPROLOL SUCCINATE 100 MG/1
100 TABLET, EXTENDED RELEASE ORAL DAILY
Qty: 90 TABLET | Refills: 3 | Status: SHIPPED | OUTPATIENT
Start: 2017-05-30 | End: 2017-01-01

## 2017-05-30 RX ORDER — ROSUVASTATIN CALCIUM 40 MG/1
40 TABLET, COATED ORAL DAILY
Qty: 90 TABLET | Refills: 3 | Status: SHIPPED | OUTPATIENT
Start: 2017-05-30 | End: 2017-01-01

## 2017-05-30 RX ORDER — LOSARTAN POTASSIUM 100 MG/1
100 TABLET ORAL DAILY
Qty: 90 TABLET | Refills: 3 | Status: SHIPPED | OUTPATIENT
Start: 2017-05-30 | End: 2017-01-01

## 2017-05-30 RX ORDER — HYDROCHLOROTHIAZIDE 25 MG/1
12.5 TABLET ORAL DAILY
Qty: 45 TABLET | Refills: 3 | Status: SHIPPED | OUTPATIENT
Start: 2017-05-30 | End: 2017-01-01

## 2017-05-30 RX ORDER — ROSUVASTATIN CALCIUM 40 MG/1
40 TABLET, COATED ORAL DAILY
Qty: 90 TABLET | Refills: 3 | Status: SHIPPED | OUTPATIENT
Start: 2017-05-30 | End: 2017-05-30

## 2017-05-30 RX ORDER — LOSARTAN POTASSIUM 100 MG/1
100 TABLET ORAL DAILY
Qty: 90 TABLET | Refills: 3 | Status: SHIPPED | OUTPATIENT
Start: 2017-05-30 | End: 2017-05-30

## 2017-05-30 RX ORDER — HYDROCHLOROTHIAZIDE 25 MG/1
12.5 TABLET ORAL DAILY
Qty: 45 TABLET | Refills: 3 | Status: SHIPPED | OUTPATIENT
Start: 2017-05-30 | End: 2017-05-30

## 2017-05-30 RX ORDER — METOPROLOL SUCCINATE 100 MG/1
100 TABLET, EXTENDED RELEASE ORAL DAILY
Qty: 90 TABLET | Refills: 3 | Status: SHIPPED | OUTPATIENT
Start: 2017-05-30 | End: 2017-05-30

## 2017-05-30 NOTE — MR AVS SNAPSHOT
After Visit Summary   5/30/2017    Guille Aguilar    MRN: [id number]           Patient Information     Date Of Birth          1940        Visit Information        Provider Department      5/30/2017 3:45 PM Monster Washington MD [facility]        Today's Diagnoses     Coronary artery disease involving native coronary artery of native heart without angina pectoris    -  1    BENIGN HYPERTENSION        Essential hypertension, benign        Mixed hyperlipidemia        Pain in both feet        Noncompliance with medication regimen        HDL deficiency           Follow-ups after your visit        Additional Services     Follow-Up with Cardiac Advanced Practice Provider           Follow-Up with Cardiologist                 Your next 10 appointments already scheduled     Jun 29, 2017  7:30 AM CDT   LAB with [facility]   [facility]    [address] [place] [id number] [phone]           Patient must bring picture ID.  Patient should be prepared to give a urine specimen  Please do not eat 10-12 hours before your appointment if you are coming in fasting for labs on lipids, cholesterol, or glucose (sugar).  Pregnant women should follow their Care Team instructions. Water with medications is okay. Do not drink coffee or other fluids.   If you have concerns about taking  your medications, please ask at office or if scheduling via Rubik[employer]oud, send a message by clicking on Secure Messaging, Message Your Care Team.              Future tests that were ordered for you today     Open Future Orders        Priority Expected Expires Ordered    Basic metabolic panel Routine 5/30/2018 7/4/2018 5/30/2017    Lipid Profile Routine 5/30/2018 7/4/2018 5/30/2017    Follow-Up with Cardiac Advanced Practice Provider Routine 5/30/2018 10/12/2018 5/30/2017    Basic metabolic panel Routine 5/30/2019  "2019    Lipid Profile Routine 2019    Follow-Up with Cardiologist Routine 2019    Basic metabolic panel Routine 2017    Lipid Profile Routine 2017    ALT Routine 2017            Who to contact     If you have questions or need follow up information about today's clinic visit or your schedule please contact [facility] [place] directly at [phone].  Normal or non-critical lab and imaging results will be communicated to you by MyChart, letter or phone within 4 business days after the clinic has received the results. If you do not hear from us within 7 days, please contact the clinic through [employer] or phone. If you have a critical or abnormal lab result, we will notify you by phone as soon as possible.  Submit refill requests through [employer] or call your pharmacy and they will forward the refill request to us. Please allow 3 business days for your refill to be completed.          Additional Information About Your Visit        MyChart Information     [employer] lets you send messages to your doctor, view your test results, renew your prescriptions, schedule appointments and more. To sign up, go to [email or web address] . Click on \"Log in\" on the left side of the screen, which will take you to the Welcome page. Then click on \"Sign up Now\" on the right side of the page.     You will be asked to enter the access code listed below, as well as some personal information. Please follow the directions to create your username and password.     Your access code is: [email or web address]  Expires: 2017  5:08 PM     Your access code will  in 90 days. If you need help or a new code, please call your [place] clinic or [phone].        Care EveryWhere ID     This is your Care EveryWhere ID. This could be used by other organizations to access your " "[place] medical records  [id number]        Your Vitals Were     Pulse Height BMI (Body Mass Index)             76 1.88 m (6' 2\") 26.71 kg/m2          Blood Pressure from Last 3 Encounters:   05/30/17 148/86   02/27/17 118/64   11/08/16 128/84    Weight from Last 3 Encounters:   05/30/17 94.3 kg (208 lb)   02/27/17 99.8 kg (220 lb)   11/08/16 103.4 kg (228 lb)                 Today's Medication Changes          These changes are accurate as of: 5/30/17  5:08 PM.  If you have any questions, ask your nurse or doctor.               Start taking these medicines.        Dose/Directions    hydrochlorothiazide 25 MG tablet   Commonly known as:  HYDRODIURIL   Used for:  Essential hypertension, benign, Coronary artery disease involving native coronary artery of native heart without angina pectoris   Started by:  Monster Washington MD        Dose:  12.5 mg   Take 0.5 tablets (12.5 mg) by mouth daily   Quantity:  45 tablet   Refills:  3       losartan 100 MG tablet   Commonly known as:  COZAAR   Used for:  Essential hypertension, benign   Started by:  Monster Washington MD        Dose:  100 mg   Take 1 tablet (100 mg) by mouth daily   Quantity:  90 tablet   Refills:  3       metoprolol 100 MG 24 hr tablet   Commonly known as:  TOPROL XL   Used for:  Coronary artery disease involving native coronary artery of native heart without angina pectoris   Started by:  Monster Washington MD        Dose:  100 mg   Take 1 tablet (100 mg) by mouth daily   Quantity:  90 tablet   Refills:  3       rosuvastatin 40 MG tablet   Commonly known as:  CRESTOR   Used for:  Coronary artery disease involving native coronary artery of native heart without angina pectoris   Started by:  Monster Washington MD        Dose:  40 mg   Take 1 tablet (40 mg) by mouth daily   Quantity:  90 tablet   Refills:  3         Stop taking these medicines if you haven't already. Please contact your care team if you have questions.     atorvastatin 80 MG tablet "   Commonly known as:  LIPITOR   Stopped by:  Monster Washington MD           metoprolol 50 MG tablet   Commonly known as:  LOPRESSOR   Stopped by:  Monster Washington MD                Where to get your medicines      These medications were sent to [facility] - [place], [place] - [address]  [address], [facility] [address]     Phone:  [phone]     hydrochlorothiazide 25 MG tablet    losartan 100 MG tablet    metoprolol 100 MG 24 hr tablet    rosuvastatin 40 MG tablet                Primary Care Provider Office Phone # Fax #    Magdaleno Cleveland [phone]       [facility] [place] [address] [place] [address] [address]  [place] [address]        Thank you!     Thank you for choosing [facility] [place]  for your care. Our goal is always to provide you with excellent care. Hearing back from our patients is one way we can continue to improve our services. Please take a few minutes to complete the written survey that you may receive in the mail after your visit with us. Thank you!             Your Updated Medication List - Protect others around you: Learn how to safely use, store and throw away your medicines at www.disposemymeds.org.          This list is accurate as of: 5/30/17  5:08 PM.  Always use your most recent med list.                   Brand Name Dispense Instructions for use    aspirin 81 MG tablet      1 TABLET DAILY       coenzyme Q-10 100 MG Caps      Take by mouth daily       fluticasone 50 MCG/ACT spray    FLONASE    16 g    Spray 1-2 sprays into both nostrils daily       hydrochlorothiazide 25 MG tablet    HYDRODIURIL    45 tablet    Take 0.5 tablets (12.5 mg) by mouth daily       losartan 100 MG tablet    COZAAR    90 tablet    Take 1 tablet (100 mg) by mouth daily       metoprolol 100 MG 24 hr tablet    TOPROL XL    90 tablet    Take 1 tablet (100 mg) by mouth daily       NITRO-DUR 0.4 MG/HR 24 hr patch   Generic drug:   nitroglycerin      Place 1 patch onto the skin as needed.       rosuvastatin 40 MG tablet    CRESTOR    90 tablet    Take 1 tablet (40 mg) by mouth daily       XALATAN 0.005 % ophthalmic solution   Generic drug:  latanoprost      1 drop left eye once daily

## 2017-05-30 NOTE — PROGRESS NOTES
HPI and Plan:   See dictation    Orders Placed This Encounter   Procedures     Basic metabolic panel     Lipid Profile     ALT     Basic metabolic panel     Lipid Profile     Basic metabolic panel     Lipid Profile     Follow-Up with Cardiac Advanced Practice Provider     Follow-Up with Cardiologist       Orders Placed This Encounter   Medications     DISCONTD: rosuvastatin (CRESTOR) 40 MG tablet     Sig: Take 1 tablet (40 mg) by mouth daily     Dispense:  90 tablet     Refill:  3     DISCONTD: hydrochlorothiazide (HYDRODIURIL) 25 MG tablet     Sig: Take 0.5 tablets (12.5 mg) by mouth daily     Dispense:  45 tablet     Refill:  3     DISCONTD: losartan (COZAAR) 100 MG tablet     Sig: Take 1 tablet (100 mg) by mouth daily     Dispense:  90 tablet     Refill:  3     DISCONTD: metoprolol (TOPROL XL) 100 MG 24 hr tablet     Sig: Take 1 tablet (100 mg) by mouth daily     Dispense:  90 tablet     Refill:  3     hydrochlorothiazide (HYDRODIURIL) 25 MG tablet     Sig: Take 0.5 tablets (12.5 mg) by mouth daily     Dispense:  45 tablet     Refill:  3     losartan (COZAAR) 100 MG tablet     Sig: Take 1 tablet (100 mg) by mouth daily     Dispense:  90 tablet     Refill:  3     metoprolol (TOPROL XL) 100 MG 24 hr tablet     Sig: Take 1 tablet (100 mg) by mouth daily     Dispense:  90 tablet     Refill:  3     rosuvastatin (CRESTOR) 40 MG tablet     Sig: Take 1 tablet (40 mg) by mouth daily     Dispense:  90 tablet     Refill:  3       Medications Discontinued During This Encounter   Medication Reason     atorvastatin (LIPITOR) 80 MG tablet      hydrochlorothiazide (HYDRODIURIL) 25 MG tablet Reorder     losartan (COZAAR) 50 MG tablet Reorder     metoprolol (LOPRESSOR) 50 MG tablet      hydrochlorothiazide (HYDRODIURIL) 25 MG tablet Reorder     losartan (COZAAR) 100 MG tablet Reorder     metoprolol (TOPROL XL) 100 MG 24 hr tablet Reorder     rosuvastatin (CRESTOR) 40 MG tablet Reorder         Encounter Diagnoses   Name Primary?      Coronary artery disease involving native coronary artery of native heart without angina pectoris Yes     BENIGN HYPERTENSION      Essential hypertension, benign      Mixed hyperlipidemia      Pain in both feet      Noncompliance with medication regimen        CURRENT MEDICATIONS:  Current Outpatient Prescriptions   Medication Sig Dispense Refill     hydrochlorothiazide (HYDRODIURIL) 25 MG tablet Take 0.5 tablets (12.5 mg) by mouth daily 45 tablet 3     losartan (COZAAR) 100 MG tablet Take 1 tablet (100 mg) by mouth daily 90 tablet 3     metoprolol (TOPROL XL) 100 MG 24 hr tablet Take 1 tablet (100 mg) by mouth daily 90 tablet 3     rosuvastatin (CRESTOR) 40 MG tablet Take 1 tablet (40 mg) by mouth daily 90 tablet 3     fluticasone (FLONASE) 50 MCG/ACT spray Spray 1-2 sprays into both nostrils daily 16 g prn     coenzyme Q-10 100 MG CAPS Take by mouth daily       nitroGLYCERIN (NITRO-DUR) 0.4 MG/HR Place 1 patch onto the skin as needed.       ASPIRIN 81 MG OR TABS 1 TABLET DAILY       XALATAN 0.005 % OP SOLN 1 drop left eye once daily        [DISCONTINUED] rosuvastatin (CRESTOR) 40 MG tablet Take 1 tablet (40 mg) by mouth daily 90 tablet 3     [DISCONTINUED] hydrochlorothiazide (HYDRODIURIL) 25 MG tablet Take 0.5 tablets (12.5 mg) by mouth daily 45 tablet 3     [DISCONTINUED] losartan (COZAAR) 100 MG tablet Take 1 tablet (100 mg) by mouth daily 90 tablet 3     [DISCONTINUED] metoprolol (TOPROL XL) 100 MG 24 hr tablet Take 1 tablet (100 mg) by mouth daily 90 tablet 3     [DISCONTINUED] hydrochlorothiazide (HYDRODIURIL) 25 MG tablet Take 0.5 tablets (12.5 mg) by mouth daily 45 tablet 3     [DISCONTINUED] losartan (COZAAR) 50 MG tablet Take 1 tablet (50 mg) by mouth 2 times daily (Patient taking differently: Take 50 mg by mouth daily ) 180 tablet 3       ALLERGIES     Allergies   Allergen Reactions     Dust Mites      No Known Allergies        PAST MEDICAL HISTORY:  Past Medical History:   Diagnosis Date     CAD  (coronary artery disease)     CABG 2011: LIMA to LAD, SVG to OM, SVG Y-graft to posterolateral and PDA, cardiac cath 2011: BMS to OM, cath 2006: medicalmanagment     Dental abscess      Essential hypertension, benign      Glaucoma 12/4/2009     High cholesterol 12/4/2009     Hyperlipidaemia      Left ventricular diastolic dysfunction      Non Q wave myocardial infarction (H)     2006, 2011     Obesity, unspecified        PAST SURGICAL HISTORY:  Past Surgical History:   Procedure Laterality Date     CATARACT IOL, RT/LT      left     CORONARY ARTERY BYPASS  2011    CABG 2011: LIMA to LAD, SVG to OM, SVG Y-graft to posterolateral and PDA     HEART CATH, ANGIOPLASTY      2006 OM1, unsuccessful PCI-medical management       FAMILY HISTORY:  Family History   Problem Relation Age of Onset     Other - See Comments Mother 83     old age     Other - See Comments Father      fall       SOCIAL HISTORY:  Social History     Social History     Marital status:      Spouse name: N/A     Number of children: N/A     Years of education: N/A     Social History Main Topics     Smoking status: Never Smoker     Smokeless tobacco: Never Used     Alcohol use No     Drug use: No     Sexual activity: Yes     Partners: Female     Other Topics Concern     Caffeine Concern Yes     2 cups daily of coffee     Sleep Concern No     Stress Concern No     Weight Concern No     Special Diet No     Exercise Yes     walking in the mall     Parent/Sibling W/ Cabg, Mi Or Angioplasty Before 65f 55m? Yes     Social History Narrative       Review of Systems:  Skin:  Negative       Eyes:  Positive for glasses    ENT:  Negative      Respiratory:  Positive for dyspnea on exertion     Cardiovascular:  Negative      Gastroenterology: Negative      Genitourinary:  Negative      Musculoskeletal:  Negative      Neurologic:  Negative      Psychiatric:  Negative      Heme/Lymph/Imm:  Negative      Endocrine:  Negative        Physical Exam:  Vitals: /86   "Pulse 76  Ht 1.88 m (6' 2\")  Wt 94.3 kg (208 lb)  BMI 26.71 kg/m2    Constitutional:  cooperative, alert and oriented, well developed, well nourished, in no acute distress overweight      Skin:  warm and dry to the touch, no apparent skin lesions or masses noted        Head:  normocephalic, no masses or lesions        Eyes:  pupils equal and round, conjunctivae and lids unremarkable, sclera white, no xanthalasma, EOMS intact, no nystagmus        ENT:  no pallor or cyanosis, dentition good        Neck:  no carotid bruit;carotid pulses are full and equal bilaterally        Chest:  normal breath sounds, clear to auscultation, normal A-P diameter, normal symmetry, normal respiratory excursion, no use of accessory muscles          Cardiac: regular rhythm;normal S1 and S2;no murmurs, gallops or rubs detected                  Abdomen:           Vascular: pulses full and equal                                        Extremities and Back:  no edema;no spinal abnormalities noted;normal muscle strength and tone              Neurological:  affect appropriate, oriented to time, person and place;no gross motor deficits              CC  No referring provider defined for this encounter.              "

## 2017-05-30 NOTE — LETTER
5/30/2017    Magdaleno Cleveland MD  [facility] [place]   [address] [place] [address] [address]  [place] [address]    RE: Guille Aguilar     Dear Colleague,    I had the pleasure of seeing Guille Aguilar in the [facility].      Mr. Aguilar is a very nice 77-year-old gentleman with past medical history significant for non-Q-wave myocardial infarction in 2006 at which time he had a flush occlusion of his first obtuse marginal that could not be opened.  It was well collateralized.  We treated him medically.  In 02/2011, he again presented with a non-Q-wave myocardial infarction.  The culprit stenosis this time was a distal tight posterolateral branch.  We treated with an Integrity bare-metal stent as he had multivessel coronary artery disease and ultimately went on to coronary bypass grafting x4 by Dr. Nicola Lainez in 03/2011.      Guille returns to clinic stating that he thinks his heart is doing well.  He has no chest, arm, neck, jaw or shoulder discomfort.  No dyspnea on exertion, orthopnea or PND.  No palpitations, lightheadedness, dizziness, syncope or near-syncope.  He does have a fair number of complaints.  He complains about his feet.  He also complains about the multiple doctors he had to see about his feet and how he was getting poor treatment and did not feel he was taken very seriously.  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  He did not have plantar fasciitis.  He does have peripheral neuropathy.  He asks about whether this could be an arterial vascular problem or venous circulation problem.  He is a very loquacious fellow.  He also in talking to him ultimately it is apparent that he is not taking his medications as prescribed and not taking some at all.  Initially, he states he does not know why his blood pressure is not well controlled at  this time, but in talking to him, when he does take his medications, he takes it once a day instead of twice a day and it appears he has run out of some of them.  Also he states he is suspicious about his statin.  His wife had problems with her sugars going out of control on a statin so he himself has not taken it but does not admit to this until we discuss his fasting lipid profile.      Outpatient Encounter Prescriptions as of 5/30/2017   Medication Sig Dispense Refill     hydrochlorothiazide (HYDRODIURIL) 25 MG tablet Take 0.5 tablets (12.5 mg) by mouth daily 45 tablet 3     losartan (COZAAR) 100 MG tablet Take 1 tablet (100 mg) by mouth daily 90 tablet 3     metoprolol (TOPROL XL) 100 MG 24 hr tablet Take 1 tablet (100 mg) by mouth daily 90 tablet 3     rosuvastatin (CRESTOR) 40 MG tablet Take 1 tablet (40 mg) by mouth daily 90 tablet 3     fluticasone (FLONASE) 50 MCG/ACT spray Spray 1-2 sprays into both nostrils daily 16 g prn     coenzyme Q-10 100 MG CAPS Take by mouth daily       nitroGLYCERIN (NITRO-DUR) 0.4 MG/HR Place 1 patch onto the skin as needed.       ASPIRIN 81 MG OR TABS 1 TABLET DAILY       XALATAN 0.005 % OP SOLN 1 drop left eye once daily        [DISCONTINUED] rosuvastatin (CRESTOR) 40 MG tablet Take 1 tablet (40 mg) by mouth daily 90 tablet 3     [DISCONTINUED] hydrochlorothiazide (HYDRODIURIL) 25 MG tablet Take 0.5 tablets (12.5 mg) by mouth daily 45 tablet 3     [DISCONTINUED] losartan (COZAAR) 100 MG tablet Take 1 tablet (100 mg) by mouth daily 90 tablet 3     [DISCONTINUED] metoprolol (TOPROL XL) 100 MG 24 hr tablet Take 1 tablet (100 mg) by mouth daily 90 tablet 3     [DISCONTINUED] atorvastatin (LIPITOR) 80 MG tablet Take 1 tablet (80 mg) by mouth At Bedtime 90 tablet 3     [DISCONTINUED] hydrochlorothiazide (HYDRODIURIL) 25 MG tablet Take 0.5 tablets (12.5 mg) by mouth daily 45 tablet 3     [DISCONTINUED] metoprolol (LOPRESSOR) 50 MG tablet Take 1 tablet (50 mg) by mouth 2 times daily  (Patient taking differently: Take 50 mg by mouth daily ) 180 tablet 3     [DISCONTINUED] losartan (COZAAR) 50 MG tablet Take 1 tablet (50 mg) by mouth 2 times daily (Patient taking differently: Take 50 mg by mouth daily ) 180 tablet 3     No facility-administered encounter medications on file as of 5/30/2017.      ASSESSMENT AND PLAN:     1.  Blood pressure is not well controlled at 148/86, although again I think this is medical noncompliance.  I reviewed his medications.  We are going to change them all to once-a-day medications, including losartan 100 mg once a day and Toprol 100 mg once a day.  We will also continue his hydrochlorothiazide at 12.5 mg a day.   2.  Fasting lipid profile is terrible with cholesterol of 181 up from 99, HDL is 26 and LDL is 118 up from 46, triglycerides are 185.  In reviewing his medications, we talked about the importance of consistently staying on a statin given his multivessel coronary artery disease.  I pointed out that his glucoses are actually fairly well controlled and that it dramatically decreases his risk of future events.  I will take this opportunity to switch him to rosuvastatin which would be better for his HDL, his triglycerides and LDL.  I will have him come back in 1 month and have him follow up with my TRINA.  We will see what his fasting lipid profile is and also check on his blood pressure.  I did send all his prescriptions into to [facility] at which time he then decided he wanted them sent to [facility] so I resent all his prescriptions to [facility].   3.  We talked about his foot pain.  It is clearly not a peripheral vascular problem.  He has great peripheral pulses.  He has no evidence of venous insufficiency or peripheral edema.  We talked about how his symptoms would be different.  His symptoms do sound as the neurologist has explained to him and I explained again.   4.  Weight is down at 208 pounds.  I have congratulated him on this, but it is unclear as to why his  weight has gone down.    He does not have any good explanation other than he states his appetite just has not been as good.  Hopefully, this is not a harbinger of bad things.     5.  He asks about whether I think his heart is being weakened by his foot pain, and I told him I do not think there is any relationship.      I will have him follow up on an annual basis once we get his blood pressure and cholesterol all set up.      Thank you for allowing me to participate in his care.       Sincerely,    Monster Washington MD     [facility]

## 2017-05-31 NOTE — PROGRESS NOTES
HISTORY OF PRESENT ILLNESS:  Mr. Aguilar is a very nice 77-year-old gentleman with past medical history significant for non-Q-wave myocardial infarction in 2006 at which time he had a flush occlusion of his first obtuse marginal that could not be opened.  It was well collateralized.  We treated him medically.  In 02/2011, he again presented with a non-Q-wave myocardial infarction.  The culprit stenosis this time was a distal tight posterolateral branch.  We treated with an Integrity bare-metal stent as he had multivessel coronary artery disease and ultimately went on to coronary bypass grafting x4 by Dr. Nicola Lainez in 03/2011.      Guille returns to clinic stating that he thinks his heart is doing well.  He has no chest, arm, neck, jaw or shoulder discomfort.  No dyspnea on exertion, orthopnea or PND.  No palpitations, lightheadedness, dizziness, syncope or near-syncope.  He does have a fair number of complaints.  He complains about his feet.  He also complains about the multiple doctors he had to see about his feet and how he was getting poor treatment and did not feel he was taken very seriously.  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  He did not have plantar fasciitis.  He does have peripheral neuropathy.  He asks about whether this could be an arterial vascular problem or venous circulation problem.  He is a very loquacious fellow.  He also in talking to him ultimately it is apparent that he is not taking his medications as prescribed and not taking some at all.  Initially, he states he does not know why his blood pressure is not well controlled at this time, but in talking to him, when he does take his medications, he takes it once a day instead of twice a day and it appears he has run out of some of them.  Also he states he is suspicious about his statin.  His wife  had problems with her sugars going out of control on a statin so he himself has not taken it but does not admit to this until we discuss his fasting lipid profile.      ASSESSMENT AND PLAN:     1.  Blood pressure is not well controlled at 148/86, although again I think this is medical noncompliance.  I reviewed his medications.  We are going to change them all to once-a-day medications, including losartan 100 mg once a day and Toprol 100 mg once a day.  We will also continue his hydrochlorothiazide at 12.5 mg a day.   2.  Fasting lipid profile is terrible with cholesterol of 181 up from 99, HDL is 26 and LDL is 118 up from 46, triglycerides are 185.  In reviewing his medications, we talked about the importance of consistently staying on a statin given his multivessel coronary artery disease.  I pointed out that his glucoses are actually fairly well controlled and that it dramatically decreases his risk of future events.  I will take this opportunity to switch him to rosuvastatin which would be better for his HDL, his triglycerides and LDL.  I will have him come back in 1 month and have him follow up with my TRINA.  We will see what his fasting lipid profile is and also check on his blood pressure.  I did send all his prescriptions into to [facility] at which time he then decided he wanted them sent to [facility] so I resent all his prescriptions to [facility].   3.  We talked about his foot pain.  It is clearly not a peripheral vascular problem.  He has great peripheral pulses.  He has no evidence of venous insufficiency or peripheral edema.  We talked about how his symptoms would be different.  His symptoms do sound as the neurologist has explained to him and I explained again.   4.  Weight is down at 208 pounds.  I have congratulated him on this, but it is unclear as to why his weight has gone down.  He does not have any good explanation other than he states his appetite just has not been as good.  Hopefully, this is not a  harbinger of bad things.   5.  He asks about whether I think his heart is being weakened by his foot pain, and I told him I do not think there is any relationship.      I will have him follow up on an annual basis once we get his blood pressure and cholesterol all set up.      Thank you for allowing me to participate in his care.         DEIRDRE YADAV MD, [facility]             D: 2017 17:07   T: 2017 11:41   MT: [email or web address]      Name:     ARCHIE ALDANA   MRN:      [id number]        Account:      [id number]   :      1940           Service Date: 2017      Document: [id number]

## 2017-06-28 ENCOUNTER — TELEPHONE (OUTPATIENT)
Dept: CARDIOLOGY | Facility: CLINIC | Age: 77
End: 2017-06-28

## 2017-06-28 NOTE — TELEPHONE ENCOUNTER
Message from patient, he will have his labs done at [facility]D clinic and asks to cancel his appointment for tomorrow. Message forwarded to scheduling.

## 2017-07-24 ENCOUNTER — TELEPHONE (OUTPATIENT)
Dept: CARDIOLOGY | Facility: CLINIC | Age: 77
End: 2017-07-24

## 2017-07-24 NOTE — TELEPHONE ENCOUNTER
Patient called with multiple issues. BP ranging 102-114/67 , calf and feet pain, Unsure of medications and what he's taking. ...  Advised patient to make [facility] OV to discuss. Patient states unhappiness with medical doctors and no concrete answered. Patient will think about making an OV and call back.

## 2017-10-02 NOTE — MR AVS SNAPSHOT
"              After Visit Summary   10/2/2017    Guille Aguilar    MRN: [id number]           Patient Information     Date Of Birth          1940        Visit Information        Provider Department      10/2/2017 11:00 AM Jacqueline Kerr MD [facility]        Today's Diagnoses     Chronic fatigue    -  1    Episode of recurrent major depressive disorder, unspecified depression episode severity (H)        Essential hypertension, benign           Follow-ups after your visit        Your next 10 appointments already scheduled     Nov 02, 2017  9:30 AM CDT   Office Visit with Jacqueline Kerr MD   [facility] ([facility])    [address] [facility] [id number] [phone]           Bring a current list of meds and any records pertaining to this visit. For Physicals, please bring immunization records and any forms needing to be filled out. Please arrive 10 minutes early to complete paperwork.              Who to contact     If you have questions or need follow up information about today's clinic visit or your schedule please contact [facility] directly at [phone].  Normal or non-critical lab and imaging results will be communicated to you by Kudohart, letter or phone within 4 business days after the clinic has received the results. If you do not hear from us within 7 days, please contact the clinic through [employer] or phone. If you have a critical or abnormal lab result, we will notify you by phone as soon as possible.  Submit refill requests through Verican or call your pharmacy and they will forward the refill request to us. Please allow 3 business days for your refill to be completed.          Additional Information About Your Visit        KudoharBitX Information     Verican lets you send messages to your doctor, view your test results, renew your prescriptions, schedule appointments and more. To sign up, go to [email or web address] . Click on \"Log in\" " "on the left side of the screen, which will take you to the Welcome page. Then click on \"Sign up Now\" on the right side of the page.     You will be asked to enter the access code listed below, as well as some personal information. Please follow the directions to create your username and password.     Your access code is: [email or web address]  Expires: 2017 12:32 PM     Your access code will  in 90 days. If you need help or a new code, please call your [facility] or [phone].        Care EveryWhere ID     This is your Care EveryWhere ID. This could be used by other organizations to access your [facility] medical records  [id number]        Your Vitals Were     Pulse Temperature Height Pulse Oximetry BMI (Body Mass Index)       75 95.2  F (35.1  C) (Oral) 6' 2\" (1.88 m) 99% 24.78 kg/m2        Blood Pressure from Last 3 Encounters:   10/02/17 101/68   17 148/86   17 118/64    Weight from Last 3 Encounters:   10/02/17 193 lb (87.5 kg)   17 208 lb (94.3 kg)   17 220 lb (99.8 kg)              We Performed the Following     CBC with platelets and differential     Folate     TSH with free T4 reflex     Vitamin B12          Today's Medication Changes          These changes are accurate as of: 10/2/17 12:32 PM.  If you have any questions, ask your nurse or doctor.               Start taking these medicines.        Dose/Directions    mirtazapine 30 MG tablet   Commonly known as:  REMERON   Used for:  Episode of recurrent major depressive disorder, unspecified depression episode severity (H)   Started by:  Jacqueline Kerr MD        Dose:  30 mg   Take 1 tablet (30 mg) by mouth At Bedtime   Quantity:  90 tablet   Refills:  3         These medicines have changed or have updated prescriptions.        Dose/Directions    losartan 100 MG tablet   Commonly known as:  COZAAR   This may have changed:    - how much to take  - when to take this   Used for:  Essential hypertension, benign        Dose: "  100 mg   Take 1 tablet (100 mg) by mouth daily   Quantity:  90 tablet   Refills:  3            Where to get your medicines      These medications were sent to [id number] - [place], [place] - [address] [facility] [address] [facility]  [address] [place] [place] [address]     Phone:  [phone]     mirtazapine 30 MG tablet                Primary Care Provider Office Phone # Fax #    Jacqueline Franco Kerr [phone]       [facility] [address] [place] [address] [address]  [facility] [address]        Equal Access to Services     KANDICE BERMEO : Hadii aad ku hadasho Soomaali, waaxda luqadaha, qaybta kaalmada adeegyada, waxramírez lilly . So [facility] [phone].    ATENCIÓN: Si habla español, tiene a puri disposición servicios gratuitos de asistencia lingüística. [facility] [phone].    We comply with applicable federal civil rights laws and [place] laws. We do not discriminate on the basis of race, color, national origin, age, disability, sex, sexual orientation, or gender identity.            Thank you!     Thank you for choosing [facility]  for your care. Our goal is always to provide you with excellent care. Hearing back from our patients is one way we can continue to improve our services. Please take a few minutes to complete the written survey that you may receive in the mail after your visit with us. Thank you!             Your Updated Medication List - Protect others around you: Learn how to safely use, store and throw away your medicines at www.disposemymeds.org.          This list is accurate as of: 10/2/17 12:32 PM.  Always use your most recent med list.                   Brand Name Dispense Instructions for use Diagnosis    aspirin 81 MG tablet      1 TABLET DAILY        coenzyme Q-10 100 MG Caps      Take by mouth daily        hydrochlorothiazide 25 MG tablet    HYDRODIURIL    45 tablet    Take 0.5 tablets (12.5 mg) by mouth  daily    Essential hypertension, benign, Coronary artery disease involving native coronary artery of native heart without angina pectoris       losartan 100 MG tablet    COZAAR    90 tablet    Take 1 tablet (100 mg) by mouth daily    Essential hypertension, benign       metoprolol 100 MG 24 hr tablet    TOPROL XL    90 tablet    Take 1 tablet (100 mg) by mouth daily    Coronary artery disease involving native coronary artery of native heart without angina pectoris       mirtazapine 30 MG tablet    REMERON    90 tablet    Take 1 tablet (30 mg) by mouth At Bedtime    Episode of recurrent major depressive disorder, unspecified depression episode severity (H)       NITRO-DUR 0.4 MG/HR 24 hr patch   Generic drug:  nitroGLYcerin      Place 1 patch onto the skin as needed.        rosuvastatin 40 MG tablet    CRESTOR    90 tablet    Take 1 tablet (40 mg) by mouth daily    Coronary artery disease involving native coronary artery of native heart without angina pectoris       TURMERIC PO      Take by mouth daily as needed

## 2017-10-02 NOTE — PROGRESS NOTES
Chief Complaint:     Establish care    HPI:   Patient Guille Aguilar is a very pleasant 77 year old male with history of chronic depression, chronic HTN, hyperlipidemia, and fatigue who presents to Internal Medicine clinic today to establish care and for evaluation of poorly controlled chronic fatigue and depression symptoms. Regarding the patient's HTN, the patient's BP is currently well controlled for his age. Regarding the patient's chronic depression, the patient reports that his depression symptoms are currently not well controlled. He is not currently on any anti-depressant medication and he is willing to start a new anti-depressant medication for depression control. The patient also complains of chronic fatigue for several months duration of unclear etiology. Patient is concerned about anemia and low thyroid. Patient denies any chest pain, headaches, fever or chills.         Current Medications:     Current Outpatient Prescriptions   Medication Sig Dispense Refill     TURMERIC PO Take by mouth daily as needed       mirtazapine (REMERON) 30 MG tablet Take 1 tablet (30 mg) by mouth At Bedtime 90 tablet 3     losartan (COZAAR) 100 MG tablet Take 1 tablet (100 mg) by mouth daily (Patient taking differently: Take 50 mg by mouth 2 times daily ) 90 tablet 3     metoprolol (TOPROL XL) 100 MG 24 hr tablet Take 1 tablet (100 mg) by mouth daily 90 tablet 3     rosuvastatin (CRESTOR) 40 MG tablet Take 1 tablet (40 mg) by mouth daily 90 tablet 3     coenzyme Q-10 100 MG CAPS Take by mouth daily       ASPIRIN 81 MG OR TABS 1 TABLET DAILY       hydrochlorothiazide (HYDRODIURIL) 25 MG tablet Take 0.5 tablets (12.5 mg) by mouth daily (Patient not taking: Reported on 10/2/2017) 45 tablet 3     nitroGLYCERIN (NITRO-DUR) 0.4 MG/HR Place 1 patch onto the skin as needed.           Allergies:      Allergies   Allergen Reactions     Dust Mites      No Known Allergies             Past Medical History:     Past Medical History:    Diagnosis Date     CAD (coronary artery disease)     CABG 2011: LIMA to LAD, SVG to OM, SVG Y-graft to posterolateral and PDA, cardiac cath 2011: BMS to OM, cath 2006: medicalmanagment     Dental abscess      Essential hypertension, benign      Glaucoma 12/4/2009     High cholesterol 12/4/2009     Hyperlipidaemia      Left ventricular diastolic dysfunction      Non Q wave myocardial infarction (H)     2006, 2011     Obesity, unspecified          Past Surgical History:     Past Surgical History:   Procedure Laterality Date     CATARACT IOL, RT/LT      left     CORONARY ARTERY BYPASS  2011    CABG 2011: LIMA to LAD, SVG to OM, SVG Y-graft to posterolateral and PDA     HEART CATH, ANGIOPLASTY      2006 OM1, unsuccessful PCI-medical management         Family Medical History:     Family History   Problem Relation Age of Onset     Other - See Comments Mother 83     old age     Other - See Comments Father      fall         Social History:     Social History     Social History     Marital status:      Spouse name: N/A     Number of children: N/A     Years of education: N/A     Occupational History     Not on file.     Social History Main Topics     Smoking status: Never Smoker     Smokeless tobacco: Never Used     Alcohol use No     Drug use: No     Sexual activity: Yes     Partners: Female     Other Topics Concern     Caffeine Concern Yes     2 cups daily of coffee     Sleep Concern No     Stress Concern No     Weight Concern No     Special Diet No     Exercise Yes     walking in the mall     Parent/Sibling W/ Cabg, Mi Or Angioplasty Before 65f 55m? Yes     Social History Narrative           Review of System:     Constitutional: Negative for fever or chills. Positive for chronic fatigue.  Skin: Negative for rashes  Ears/Nose/Throat: Negative for nasal congestion, sore throat  Respiratory: No shortness of breath, dyspnea on exertion, cough, or hemoptysis  Cardiovascular: Negative for chest pain  Gastrointestinal:  "Negative for nausea, vomiting  Genitourinary: Negative for dysuria, hematuria  Musculoskeletal: Negative for myalgias  Neurologic: Negative for headaches  Psychiatric: Positive for depression  Hematologic/Lymphatic/Immunologic: Negative  Endocrine: Negative  Behavioral: Negative for tobacco use       Physical Exam:   /68 (BP Location: Right arm, Patient Position: Chair, Cuff Size: Adult Regular)  Pulse 75  Temp 95.2  F (35.1  C) (Oral)  Ht 6' 2\" (1.88 m)  Wt 193 lb (87.5 kg)  SpO2 99%  BMI 24.78 kg/m2    GENERAL: chronically ill appearing elderly male, alert and no acute distress  EYES: eyes grossly normal to inspection, and conjunctivae and sclerae normal  HENT: Normocephalic atraumatic. Nose and mouth without ulcers or lesions  NECK: supple  RESP: lungs clear to auscultation   CV: regular rate and rhythm, normal S1 S2  LYMPH: no peripheral edema   ABDOMEN: nondistended  MS: no gross musculoskeletal defects noted  SKIN: no suspicious lesions or rashes  NEURO: Alert & Oriented x 3.   PSYCH: mentation appears normal, depressed affect        Diagnostic Test Results:     Diagnostic Test Results:  Results for orders placed or performed in visit on 05/26/17   Basic metabolic panel   Result Value Ref Range    Sodium 137 133 - 144 mmol/L    Potassium 4.1 3.4 - 5.3 mmol/L    Chloride 105 94 - 109 mmol/L    Carbon Dioxide 25 20 - 32 mmol/L    Anion Gap 7 3 - 14 mmol/L    Glucose 95 70 - 99 mg/dL    Urea Nitrogen 27 7 - 30 mg/dL    Creatinine 1.16 0.66 - 1.25 mg/dL    GFR Estimate 61 >60 mL/min/1.7m2    GFR Estimate If Black 74 >60 mL/min/1.7m2    Calcium 8.6 8.5 - 10.1 mg/dL   Lipid Profile   Result Value Ref Range    Cholesterol 181 <200 mg/dL    Triglycerides 185 (H) <150 mg/dL    HDL Cholesterol 26 (L) >39 mg/dL    LDL Cholesterol Calculated 118 (H) <100 mg/dL    Non HDL Cholesterol 155 (H) <130 mg/dL       ASSESSMENT/PLAN:       (R53.82) Chronic fatigue  (primary encounter diagnosis)  Comment: Patient has " chronic fatigue and is concerned about anemia and low thyroid.   Plan: I have ordered labs for CBC with platelets and differential, Vitamin B12, Folate, TSH with free T4 reflex to screen for potential causes of chronic fatigue.      (F33.9) Episode of recurrent major depressive disorder, unspecified depression episode severity (H)  Comment: Patient's depression symptoms are not currently well controlled.  Plan: I have started the patient on new anti-depressant medication with mirtazapine (REMERON) 30 MG tablet today.      (I10) Essential hypertension, benign  Comment: Patient's BP is currently well controlled.  Plan: Continue current BP medication regimen with losartan and metoprolol.        Follow Up Plan:     Patient is instructed to return to Internal Medicine clinic for follow-up visit in 1 month or sooner as needed.        Jacqueline Kerr MD  Internal Medicine  [facility]

## 2017-10-02 NOTE — LETTER
[address] [facility]  [address]  [place]  [address]  Tel: [phone]    October 5, 2017    Guille Aguilar  [address]  [place] [address]        Dear Mr. Aguilar,    Enclosed ar [employer]     If you have any further questions or problems, please contact our office.      Sincerely,    Franco Kerr MD/ Yesica HEWITT CMA  Results for orders placed or performed in visit on 10/02/17   CBC with platelets and differential   Result Value Ref Range    WBC 4.9 4.0 - 11.0 10e9/L    RBC Count 3.73 (L) 4.4 - 5.9 10e12/L    Hemoglobin 11.6 (L) 13.3 - 17.7 g/dL    Hematocrit 34.7 (L) 40.0 - 53.0 %    MCV 93 78 - 100 fl    MCH 31.1 26.5 - 33.0 pg    MCHC 33.4 31.5 - 36.5 g/dL    RDW 13.7 10.0 - 15.0 %    Platelet Count 126 (L) 150 - 450 10e9/L    Diff Method Automated Method     % Neutrophils 62.1 %    % Lymphocytes 27.6 %    % Monocytes 6.4 %    % Eosinophils 3.7 %    % Basophils 0.2 %    Absolute Neutrophil 3.0 1.6 - 8.3 10e9/L    Absolute Lymphocytes 1.3 0.8 - 5.3 10e9/L    Absolute Monocytes 0.3 0.0 - 1.3 10e9/L    Absolute Eosinophils 0.2 0.0 - 0.7 10e9/L    Absolute Basophils 0.0 0.0 - 0.2 10e9/L   Vitamin B12   Result Value Ref Range    Vitamin B12 1378 (H) 193 - 986 pg/mL   Folate   Result Value Ref Range    Folate 15.5 >5.4 ng/mL   TSH with free T4 reflex   Result Value Ref Range    TSH 1.56 0.40 - 4.00 mU/L               Enclosure: Lab Results

## 2017-10-05 NOTE — TELEPHONE ENCOUNTER
I called patient and spoke with him.   #1 Patient took first dose Remeron 2 night ago at 8:00pm. He did not fall asleep until 2:00am and slept until 9:00am.   Even after waking at 9:00am he felt very groggy for another 2 hours.     Last night he took Remeron at 6:15pm  Again didn't fall asleep until 2:00am and slept until 10:00am   Again was very groggy for another few hours. Also reports of extreme fatigue.    Regularly, patient is able to fall asleep around 9:00pm.     #2 Patient also wanting to know about recent lab results.     I advised that patient NOT take Remeron tonight due to grogginess and cause of insomnia.   Will run past Dr. Kerr if wanting to prescribe alternative or have him discuss at 11/2/17 appointment with Dr. Kerr.  Also, patient wanting to know lab results.    Thank you!    Elo Delaney RN

## 2017-10-05 NOTE — TELEPHONE ENCOUNTER
Reason for Call:  Medication or medication refill:    Do you use a [facility]?  Name of the pharmacy and phone number for the current request:       [id number] - [place], [place] - [address] AT [address]      Name of the medication requested: mirtazapine    Other request: pt has been sx of blackout w/immsomnia today since he started remron     Can we leave a detailed message on this number? YES    Phone number patient can be reached at: Home number on file [phone] (home)    Best Time: any    Call taken on 10/5/2017 at 2:52 PM by Hayder Longoria

## 2017-10-05 NOTE — TELEPHONE ENCOUNTER
Called and notified pt of lab results. Also advised pt to continue Remeron medication and take it earlier in the evening around dinner time instead.

## 2017-10-13 NOTE — TELEPHONE ENCOUNTER
"He still believes that the mirtazapine is causing him to be groggy.\" It does come and go- my sleep was ok last night.\"  I asked if he was ok to continue the mirtazapine until he sees Dr Kerr on 11/2/17 and he said yes he is ok to continue.  I asked if his mood has improved and he growled, and said he hasn't noticed any differences- he was kidding. He does know that it can take weeks to take affect.  I noted that his speech was pretty slow while conversing with him but he seemed to be a good historian when talking about his Dr's at the [facility].     He is mostly calling to let us know that his BP has been low.  He reports that he stands up carefully or he will get dizzy.    Last 2 days his BP: 88/57  Dr Kerr has readings from the past per patient.    He is almost of out of BP medication and wonders if he should fill it? Is  He does have refills with [employer].      He also has neuropathy that he has been dealing with for 2 years. Pain has lessened over the years BUT he still has \"lumpy painful feet\".  He did try massaging them and he felt a thunder bolt up his leg. He went to podiatrist and he said to go to a Neurologist. I did note that he got orthotic inserts in 2016 but they had offered injections and I don't think he followed up.   He thinks he went to [facility] about 6 months ago but I can't see visit in media.   He said the neurologist told him he is in the wrong place and said he couldn't help him.      He also has a area on his right shoulder blade- the weight of his clothes pressing on his skin is tender to touch, if you put hand pressure on it hurts even more.  He denies rash ( I explained I wanted him to look for shingles) .  He said it feels good to scratch it.  He doesn't remember how long but it has been there he states  \"a long time\".  He also has an area on his chest that is tender to touch  X one year.    Dr Kerr, He is seeing you 11/2/17  Laura Barr RN- Triage [employer]      "

## 2017-10-13 NOTE — TELEPHONE ENCOUNTER
Reason for call:  Patient reporting a symptom    Symptom or request: Pt started taking mirtazapine (REMERON) 30 MG tablet last week. Pt  Feels this medication is affecting him.    Pt is still curious about BP medication as well.    Duration (how long have symptoms been present): A week    Have you been treated for this before? Yes    Additional comments: Pt would like to be contacted to discuss.    Phone Number patient can be reached at:  Home number on file [phone] (home)    Best Time:  Anytime    Can we leave a detailed message on this number:  YES    Call taken on 10/13/2017 at 1:07 PM by Shanique Leiva

## 2017-10-17 NOTE — TELEPHONE ENCOUNTER
Reason for Call:  Request for results:    Name of test or procedure: Labs    Date of test of procedure: 10/2/2017    Location of the test or procedure: [facility] [place]    OK to leave the result message on voice mail or with a family member? NO    Additional comments: Patient walked in the Clinic on 1st floor asking to speak to a Nurse  About the results he received    Call taken on 10/17/2017 at 9:23 AM by Tashi Magana

## 2017-10-17 NOTE — PATIENT INSTRUCTIONS
Please return on Friday 10/20 at 11:30 to see Dr Kerr  He will discuss the lab tests with you at that appointment

## 2017-10-17 NOTE — PROGRESS NOTES
HPI      SUBJECTIVE:   Guille Aguilar is a 77 year old male who presents to clinic today for the following health issues:      Hypertension Follow-up      Outpatient blood pressures are being checked at home.  Results are 88/57.    Low Salt Diet: no added salt    Saw PCP a few weeks ago. Given mirtazapine for symptoms but believes it caused him to be groggy so stopped it after 10 days.  His main concern today is that he hasn't had proper f/u on his lab work (although he already has an appt scheduled with PCP to f/u on labs)  No appetite and wants to sleep all the time for at least 8 months   He reports that he stands up carefully or he will get dizzy.    Feeling depressed about his feet. He describes this as nerve issues for 2 years. He has seen multiple doctors and is very frustrated that he can't find an answer     No blood in stool, heartburn    The last month took a few B12 supplements       Past Medical History:   Diagnosis Date     CAD (coronary artery disease)     CABG 2011: LIMA to LAD, SVG to OM, SVG Y-graft to posterolateral and PDA, cardiac cath 2011: BMS to OM, cath 2006: medicalmanagment     Dental abscess      Essential hypertension, benign      Glaucoma 12/4/2009     High cholesterol 12/4/2009     Hyperlipidaemia      Left ventricular diastolic dysfunction      Non Q wave myocardial infarction (H)     2006, 2011     Obesity, unspecified      Past Surgical History:   Procedure Laterality Date     CATARACT IOL, RT/LT      left     CORONARY ARTERY BYPASS  2011    CABG 2011: LIMA to LAD, SVG to OM, SVG Y-graft to posterolateral and PDA     HEART CATH, ANGIOPLASTY      2006 OM1, unsuccessful PCI-medical management     Social History   Substance Use Topics     Smoking status: Never Smoker     Smokeless tobacco: Never Used     Alcohol use No     Current Outpatient Prescriptions   Medication Sig Dispense Refill     TURMERIC PO Take by mouth daily as needed       mirtazapine (REMERON) 30 MG tablet Take 1  "tablet (30 mg) by mouth At Bedtime 90 tablet 3     hydrochlorothiazide (HYDRODIURIL) 25 MG tablet Take 0.5 tablets (12.5 mg) by mouth daily 45 tablet 3     losartan (COZAAR) 100 MG tablet Take 1 tablet (100 mg) by mouth daily (Patient taking differently: Take 50 mg by mouth 2 times daily ) 90 tablet 3     metoprolol (TOPROL XL) 100 MG 24 hr tablet Take 1 tablet (100 mg) by mouth daily 90 tablet 3     rosuvastatin (CRESTOR) 40 MG tablet Take 1 tablet (40 mg) by mouth daily 90 tablet 3     coenzyme Q-10 100 MG CAPS Take by mouth daily       nitroGLYCERIN (NITRO-DUR) 0.4 MG/HR Place 1 patch onto the skin as needed.       ASPIRIN 81 MG OR TABS 1 TABLET DAILY       Allergies   Allergen Reactions     Dust Mites      No Known Allergies        Reviewed and updated as needed this visit by clinical staff and provider    ROS  Detailed as above       /74 (BP Location: Right arm, Cuff Size: Adult Regular)  Pulse 95  Temp 97.1  F (36.2  C) (Oral)  Ht 6' 2\" (1.88 m)  Wt 196 lb (88.9 kg)  SpO2 98%  BMI 25.16 kg/m2    Physical Exam   Constitutional: He is well-developed, well-nourished, and in no distress.   HENT:   Head: Normocephalic.   Eyes: Conjunctivae are normal.   Pulmonary/Chest: Effort normal.   Neurological: He is alert.   Skin: Skin is warm and dry. There is pallor.   Slightly pale hands   Psychiatric: Mood and affect normal.   Vitals reviewed.      Assessment and Plan:       ICD-10-CM    1. Anemia, unspecified type D64.9 CBC with platelets and differential     Ferritin     Iron and iron binding capacity     Haptoglobin     Lactate Dehydrogenase     Comprehensive metabolic panel     CANCELED: Bilirubin  total   2. Decreased appetite R63.0 Comprehensive metabolic panel       Pt walked into clinic today with concerns about his recent lab work. He was very concerned about the anemia on the report, when it is actually higher than he has been in the past.   He is very talkative and difficult to stay on track "   Considering the chronic anemia, will complete further workup as above. We made him an appt for this fri for f/u with PCP. If all labs are normal, then we may need to consider EGD, especially considering the loss of appetite. He also may need hematology referral if nothing specific shows with initial testing.   He does have a notable drop today in GFR. As this is new, the anemia is less likely d/t CKD.      He also has low plts today, slightly improved from a couple weeks ago. He had low plt and hgb in 2011, but this is when he had stents placed. Numbers improved at that time, but hgb never went to a normal range.       The total visit time was 25 minutes more  than 50% was spent in counseling and coordination of care as discussed above.      HARVEY French, CNP  [facility]

## 2017-10-17 NOTE — MR AVS SNAPSHOT
After Visit Summary   10/17/2017    Guille Aguilar    MRN: [id number]           Patient Information     Date Of Birth          1940        Visit Information        Provider Department      10/17/2017 12:00 PM Alvarado Cobb APRN CNP [facility]        Today's Diagnoses     Anemia, unspecified type    -  1    Decreased appetite          Care Instructions    Please return on Friday 10/20 at 11:30 to see Dr Kerr  He will discuss the lab tests with you at that appointment           Follow-ups after your visit        Your next 10 appointments already scheduled     Oct 17, 2017 12:00 PM CDT   Office Visit with HARVEY Humphries CNP   [facility] ([facility])    [address] [id number] [phone]           Bring a current list of meds and any records pertaining to this visit. For Physicals, please bring immunization records and any forms needing to be filled out. Please arrive 10 minutes early to complete paperwork.            Nov 02, 2017  9:30 AM CDT   Office Visit with Jacqueline Kerr MD   [facility] ([facility])    [address] [facility] [id number] [phone]           Bring a current list of meds and any records pertaining to this visit. For Physicals, please bring immunization records and any forms needing to be filled out. Please arrive 10 minutes early to complete paperwork.              Who to contact     If you have questions or need follow up information about today's clinic visit or your schedule please contact [facility] directly at [phone].  Normal or non-critical lab and imaging results will be communicated to you by MyChart, letter or phone within 4 business days after the clinic has received the results. If you do not hear from us within 7 days, please contact the clinic through MyChart or phone. If you have a critical or abnormal lab result, we will notify you  "by phone as soon as possible.  Submit refill requests through OneSun or call your pharmacy and they will forward the refill request to us. Please allow 3 business days for your refill to be completed.          Additional Information About Your Visit        GigalocalharNexess Information     OneSun lets you send messages to your doctor, view your test results, renew your prescriptions, schedule appointments and more. To sign up, go to [email or web address] . Click on \"Log in\" on the left side of the screen, which will take you to the Welcome page. Then click on \"Sign up Now\" on the right side of the page.     You will be asked to enter the access code listed below, as well as some personal information. Please follow the directions to create your username and password.     Your access code is: [email or web address]  Expires: 2017 12:32 PM     Your access code will  in 90 days. If you need help or a new code, please call your [place] clinic or [phone].        Care EveryWhere ID     This is your Care EveryWhere ID. This could be used by other organizations to access your [place] medical records  [id number]        Your Vitals Were     Pulse Temperature Height Pulse Oximetry BMI (Body Mass Index)       95 97.1  F (36.2  C) (Oral) 6' 2\" (1.88 m) 98% 25.16 kg/m2        Blood Pressure from Last 3 Encounters:   10/17/17 113/74   10/02/17 101/68   17 148/86    Weight from Last 3 Encounters:   10/17/17 196 lb (88.9 kg)   10/02/17 193 lb (87.5 kg)   17 208 lb (94.3 kg)              We Performed the Following     CBC with platelets and differential     Comprehensive metabolic panel     Ferritin     Haptoglobin     Iron and iron binding capacity     Lactate Dehydrogenase          Today's Medication Changes          These changes are accurate as of: 10/17/17 10:59 AM.  If you have any questions, ask your nurse or doctor.               These medicines have changed or have updated prescriptions.        " Dose/Directions    losartan 100 MG tablet   Commonly known as:  COZAAR   This may have changed:    - how much to take  - when to take this   Used for:  Essential hypertension, benign        Dose:  100 mg   Take 1 tablet (100 mg) by mouth daily   Quantity:  90 tablet   Refills:  3                Primary Care Provider Office Phone # Fax #    Jacqueline Franco Kerr [phone]       [address]  [facility] [address]        Equal Access to Services     YESSICA BERMEO : Hadii aad ku hadasho Soomaali, waaxda luqadaha, qaybta kaalmada adeegyada, waxay kein hayrezan neo lilly . So [facility] [phone].    ATENCIÓN: Si habla español, tiene a puri disposición servicios gratuitos de asistencia lingüística. [facility] [phone].    We comply with applicable federal civil rights laws and [place] laws. We do not discriminate on the basis of race, color, national origin, age, disability, sex, sexual orientation, or gender identity.            Thank you!     Thank you for choosing [facility]  for your care. Our goal is always to provide you with excellent care. Hearing back from our patients is one way we can continue to improve our services. Please take a few minutes to complete the written survey that you may receive in the mail after your visit with us. Thank you!             Your Updated Medication List - Protect others around you: Learn how to safely use, store and throw away your medicines at www.disposemymeds.org.          This list is accurate as of: 10/17/17 10:59 AM.  Always use your most recent med list.                   Brand Name Dispense Instructions for use Diagnosis    aspirin 81 MG tablet      1 TABLET DAILY        coenzyme Q-10 100 MG Caps      Take by mouth daily        hydrochlorothiazide 25 MG tablet    HYDRODIURIL    45 tablet    Take 0.5 tablets (12.5 mg) by mouth daily    Essential hypertension, benign, Coronary artery disease involving native coronary  artery of native heart without angina pectoris       losartan 100 MG tablet    COZAAR    90 tablet    Take 1 tablet (100 mg) by mouth daily    Essential hypertension, benign       metoprolol 100 MG 24 hr tablet    TOPROL XL    90 tablet    Take 1 tablet (100 mg) by mouth daily    Coronary artery disease involving native coronary artery of native heart without angina pectoris       mirtazapine 30 MG tablet    REMERON    90 tablet    Take 1 tablet (30 mg) by mouth At Bedtime    Episode of recurrent major depressive disorder, unspecified depression episode severity (H)       NITRO-DUR 0.4 MG/HR 24 hr patch   Generic drug:  nitroGLYcerin      Place 1 patch onto the skin as needed.        rosuvastatin 40 MG tablet    CRESTOR    90 tablet    Take 1 tablet (40 mg) by mouth daily    Coronary artery disease involving native coronary artery of native heart without angina pectoris       TURMERIC PO      Take by mouth daily as needed

## 2017-10-17 NOTE — NURSING NOTE
"Chief Complaint   Patient presents with     Hypertension     Last 2 days his BP: 88/57       Initial /74 (BP Location: Right arm, Cuff Size: Adult Regular)  Pulse 95  Temp 97.1  F (36.2  C) (Oral)  Ht 6' 2\" (1.88 m)  Wt 196 lb (88.9 kg)  SpO2 98%  BMI 25.16 kg/m2 Estimated body mass index is 25.16 kg/(m^2) as calculated from the following:    Height as of this encounter: 6' 2\" (1.88 m).    Weight as of this encounter: 196 lb (88.9 kg).  Medication Reconciliation: complete       Sana Diaz CMA        "

## 2017-10-17 NOTE — TELEPHONE ENCOUNTER
"Patient walked into clinic.  Elo (MARCIA) met with patient.    Main concerns wanting to address:    1.) Blood pressure being low. Reports that it fluctuates throughout day. See Tele encounter from 10/13/17 where he reports of BP 88/57  2.) Lab results from 10/2/17. Patient is concerned with low RBC, Hemoglobin, hematocrit, and platelet. Patient wondering next steps. Oral Iron supplement? Does not take anything at the moment.   3.)  Has had gradual weight loss over 1.5-2 years right around time when he started having nerve pain in feet  4.) Reports of episodes of dizziness and extreme fatigue of needing to sleep all the time, SOB when walking upstairs. Had episode yesterday of right leg loss of sensation when walking around the bed and also reports that sometimes he \"gets dark cloudy black spots in field of vision\"    VITALS taken 10/17/17 10:00am:  /78  HR 97  Spo2 97    Huddled with Alvarado.   Offered appt tomorrow with Dr. Kerr (PCP) or if patient willing to wait, can see Alvarado this afternoon.   Patient said he wants to get this resolved today and will wait to see Alvarado today.   Scheduled patient for 12:00pm and patient is waiting in [facility]. MA and Alvarado made aware.     Elo Delaeny RN          "

## 2017-10-20 NOTE — NURSING NOTE
"Chief Complaint   Patient presents with     Results     BP? Anemia?        Initial /72 (BP Location: Left arm, Patient Position: Chair, Cuff Size: Adult Regular)  Pulse 92  Temp 97.7  F (36.5  C) (Oral)  Ht 6' 2\" (1.88 m)  Wt 197 lb (89.4 kg)  SpO2 96%  BMI 25.29 kg/m2 Estimated body mass index is 25.29 kg/(m^2) as calculated from the following:    Height as of this encounter: 6' 2\" (1.88 m).    Weight as of this encounter: 197 lb (89.4 kg)..    BP completed using cuff size: regular  MEDICATIONS REVIEWED  SOCIAL AND FAMILY HX REVIEWED  Nancy Monteiro CMA  "

## 2017-10-20 NOTE — MR AVS SNAPSHOT
After Visit Summary   10/20/2017    Guille Aguilar    MRN: [id number]           Patient Information     Date Of Birth          1940        Visit Information        Provider Department      10/20/2017 11:30 AM Jacqueline Kerr MD [facility]        Today's Diagnoses     Episode of recurrent major depressive disorder, unspecified depression episode severity (H)    -  1    CKD (chronic kidney disease) stage 3, GFR 30-59 ml/min        Anemia, unspecified type        Essential hypertension, benign           Follow-ups after your visit        Additional Services     NEPHROLOGY ADULT REFERRAL       Your provider has referred you to: [facility]: [facility] [phone]   [email or web address]    Please be aware that coverage of these services is subject to the terms and limitations of your health insurance plan.  Call member services at your health plan with any benefit or coverage questions.      Reason for referral:  Unexplained decline in eGFR > 15 ml/min between two readings.   Please bring the following to your appointment:    >>   Any x-rays, CTs or MRIs which have been performed.  Contact the facility where they were done to arrange for  prior to your scheduled appointment.   >>   List of current medications   >>   This referral request   >>   Any documents/labs given to you for this referral                  Follow-up notes from your care team     Return in about 3 months (around 1/20/2018).      Your next 10 appointments already scheduled     Nov 02, 2017  9:30 AM CDT   Office Visit with Jacqueline Kerr MD   [facility] ([facility]    [address] [facility] [id number] [phone]           Bring a current list of meds and any records pertaining to this visit. For Physicals, please bring immunization records and any forms  "needing to be filled out. Please arrive 10 minutes early to complete paperwork.              Who to contact     If you have questions or need follow up information about today's clinic visit or your schedule please contact [facility] directly at [phone].  Normal or non-critical lab and imaging results will be communicated to you by MyChart, letter or phone within 4 business days after the clinic has received the results. If you do not hear from us within 7 days, please contact the clinic through [employer] or phone. If you have a critical or abnormal lab result, we will notify you by phone as soon as possible.  Submit refill requests through Moven or call your pharmacy and they will forward the refill request to us. Please allow 3 business days for your refill to be completed.          Additional Information About Your Visit        Nitol SolarharSecond Decimal Information     Moven lets you send messages to your doctor, view your test results, renew your prescriptions, schedule appointments and more. To sign up, go to [email or web address] . Click on \"Log in\" on the left side of the screen, which will take you to the Welcome page. Then click on \"Sign up Now\" on the right side of the page.     You will be asked to enter the access code listed below, as well as some personal information. Please follow the directions to create your username and password.     Your access code is: [email or web address]  Expires: 2017 12:32 PM     Your access code will  in 90 days. If you need help or a new code, please call your [place] clinic or [phone].        Care EveryWhere ID     This is your Care EveryWhere ID. This could be used by other organizations to access your [place] medical records  [id number]        Your Vitals Were     Pulse Temperature Height Pulse Oximetry BMI (Body Mass Index)       92 97.7  F (36.5  C) (Oral) 6' 2\" (1.88 m) 96% 25.29 kg/m2        Blood Pressure from Last 3 Encounters:   10/20/17 113/72 "   10/17/17 113/74   10/02/17 101/68    Weight from Last 3 Encounters:   10/20/17 197 lb (89.4 kg)   10/17/17 196 lb (88.9 kg)   10/02/17 193 lb (87.5 kg)              We Performed the Following     NEPHROLOGY ADULT REFERRAL          Today's Medication Changes          These changes are accurate as of: 10/20/17 11:53 AM.  If you have any questions, ask your nurse or doctor.               These medicines have changed or have updated prescriptions.        Dose/Directions    losartan 100 MG tablet   Commonly known as:  COZAAR   This may have changed:    - how much to take  - when to take this   Used for:  Essential hypertension, benign        Dose:  100 mg   Take 1 tablet (100 mg) by mouth daily   Quantity:  90 tablet   Refills:  3                Primary Care Provider Office Phone # Fax #    Jacqueline Franco Kerr [phone]       [facility] [address] [place] [address]        Equal Access to Services     YESSICA BERMEO AH: Hadii inna ku hadasho Soalonsoali, waaxda luqadaha, qaybta kaalmada adeegyada, waxay andrey lilly . So [facility] [phone].    ATENCIÓN: Si habla español, tiene a puri disposición servicios gratuitos de asistencia lingüística. Llame al [phone].    We comply with applicable federal civil rights laws and [place] laws. We do not discriminate on the basis of race, color, national origin, age, disability, sex, sexual orientation, or gender identity.            Thank you!     Thank you for choosing [facility]  for your care. Our goal is always to provide you with excellent care. Hearing back from our patients is one way we can continue to improve our services. Please take a few minutes to complete the written survey that you may receive in the mail after your visit with us. Thank you!             Your Updated Medication List - Protect others around you: Learn how to safely use, store and throw away your medicines at www.disposemymeds.org.           This list is accurate as of: 10/20/17 11:53 AM.  Always use your most recent med list.                   Brand Name Dispense Instructions for use Diagnosis    aspirin 81 MG tablet      1 TABLET DAILY        coenzyme Q-10 100 MG Caps      Take by mouth daily        hydrochlorothiazide 25 MG tablet    HYDRODIURIL    45 tablet    Take 0.5 tablets (12.5 mg) by mouth daily    Essential hypertension, benign, Coronary artery disease involving native coronary artery of native heart without angina pectoris       losartan 100 MG tablet    COZAAR    90 tablet    Take 1 tablet (100 mg) by mouth daily    Essential hypertension, benign       metoprolol 100 MG 24 hr tablet    TOPROL XL    90 tablet    Take 1 tablet (100 mg) by mouth daily    Coronary artery disease involving native coronary artery of native heart without angina pectoris       NITRO-DUR 0.4 MG/HR 24 hr patch   Generic drug:  nitroGLYcerin      Place 1 patch onto the skin as needed.        rosuvastatin 40 MG tablet    CRESTOR    90 tablet    Take 1 tablet (40 mg) by mouth daily    Coronary artery disease involving native coronary artery of native heart without angina pectoris       TURMERIC PO      Take by mouth daily as needed

## 2017-10-20 NOTE — PROGRESS NOTES
Chief Complaint:     Follow up on multiple concerns including HTN, anemia and depression    HPI:   Patient Guille Aguilar is a very pleasant 77 year old male with history of HTN, chronic mild anemia, depression who presents to Internal Medicine clinic today for follow up of multiple concerns. Regarding the patient's chronic bilateral foot pains, the patient reports that he has been taking high dose ibuprofen recently for pain relief. Patient's recent labs show worsening of his kidney function. Patient is interested in an evaluation by outpatient Nephrology specialist clinic going forward due to his worsening kidney function. Regarding the patient's depression, the patient was not able to tolerate the Remeron anti-depressant medication due to side effect of insomnia. Patient also has recent hypotension symptoms with hypotensive BP readings and fatigue on his current BP medication regimen. Patient denies any chest pain, headaches, fever or chills.           Current Medications:     Current Outpatient Prescriptions   Medication Sig Dispense Refill     losartan (COZAAR) 25 MG tablet Take 1 tablet (25 mg) by mouth daily 30 tablet 11     TURMERIC PO Take by mouth daily as needed       hydrochlorothiazide (HYDRODIURIL) 25 MG tablet Take 0.5 tablets (12.5 mg) by mouth daily 45 tablet 3     metoprolol (TOPROL XL) 100 MG 24 hr tablet Take 1 tablet (100 mg) by mouth daily 90 tablet 3     rosuvastatin (CRESTOR) 40 MG tablet Take 1 tablet (40 mg) by mouth daily 90 tablet 3     coenzyme Q-10 100 MG CAPS Take by mouth daily       nitroGLYCERIN (NITRO-DUR) 0.4 MG/HR Place 1 patch onto the skin as needed.       ASPIRIN 81 MG OR TABS 1 TABLET DAILY       [DISCONTINUED] losartan (COZAAR) 100 MG tablet Take 1 tablet (100 mg) by mouth daily (Patient taking differently: Take 50 mg by mouth 2 times daily ) 90 tablet 3         Allergies:      Allergies   Allergen Reactions     Dust Mites      No Known Allergies             Past Medical  History:     Past Medical History:   Diagnosis Date     CAD (coronary artery disease)     CABG 2011: LIMA to LAD, SVG to OM, SVG Y-graft to posterolateral and PDA, cardiac cath 2011: BMS to OM, cath 2006: medicalmanagment     Dental abscess      Essential hypertension, benign      Glaucoma 12/4/2009     High cholesterol 12/4/2009     Hyperlipidaemia      Left ventricular diastolic dysfunction      Non Q wave myocardial infarction (H)     2006, 2011     Obesity, unspecified          Past Surgical History:     Past Surgical History:   Procedure Laterality Date     CATARACT IOL, RT/LT      left     CORONARY ARTERY BYPASS  2011    CABG 2011: LIMA to LAD, SVG to OM, SVG Y-graft to posterolateral and PDA     HEART CATH, ANGIOPLASTY      2006 OM1, unsuccessful PCI-medical management         Family Medical History:     Family History   Problem Relation Age of Onset     Other - See Comments Mother 83     old age     Other - See Comments Father      fall         Social History:     Social History     Social History     Marital status:      Spouse name: N/A     Number of children: N/A     Years of education: N/A     Occupational History     Not on file.     Social History Main Topics     Smoking status: Never Smoker     Smokeless tobacco: Never Used     Alcohol use No     Drug use: No     Sexual activity: Not Currently     Partners: Female     Other Topics Concern     Caffeine Concern Yes     2 cups daily of coffee     Sleep Concern No     Stress Concern No     Weight Concern No     Special Diet No     Exercise Yes     walking in the mall     Parent/Sibling W/ Cabg, Mi Or Angioplasty Before 65f 55m? Yes     Social History Narrative           Review of System:     Constitutional: Negative for fever or chills. Positive for fatigue.  Skin: Negative for rashes  Ears/Nose/Throat: Negative for nasal congestion, sore throat  Respiratory: No shortness of breath, dyspnea on exertion, cough, or hemoptysis  Cardiovascular:  "Negative for chest pain  Gastrointestinal: Negative for nausea, vomiting  Genitourinary: Negative for dysuria, hematuria  Musculoskeletal: Positive for chronic bilateral foot pains  Neurologic: Negative for headaches.    Psychiatric: Positive for depression  Hematologic/Lymphatic/Immunologic: Negative  Endocrine: Negative  Behavioral: Negative for tobacco use       Physical Exam:   /72 (BP Location: Left arm, Patient Position: Chair, Cuff Size: Adult Regular)  Pulse 92  Temp 97.7  F (36.5  C) (Oral)  Ht 6' 2\" (1.88 m)  Wt 197 lb (89.4 kg)  SpO2 96%  BMI 25.29 kg/m2    GENERAL: chronically ill appearing elderly gentleman, alert and no acute distress  EYES: eyes grossly normal to inspection, and conjunctivae and sclerae normal  HENT: Normocephalic atraumatic. Nose and mouth without ulcers or lesions  NECK: supple  RESP: lungs clear to auscultation   CV: regular rate and rhythm, normal S1 S2  LYMPH: no peripheral edema   ABDOMEN: nondistended  MS: bilateral foot pains noted with ambulation. Patient is wearing special shoe inserts in both shoes.  SKIN: no suspicious lesions or rashes  NEURO: Alert & Oriented x 3.   PSYCH: mentation appears normal, mildly depressed affect        Diagnostic Test Results:     Diagnostic Test Results:  Results for orders placed or performed in visit on 10/17/17   CBC with platelets and differential   Result Value Ref Range    WBC 4.6 4.0 - 11.0 10e9/L    RBC Count 3.79 (L) 4.4 - 5.9 10e12/L    Hemoglobin 11.7 (L) 13.3 - 17.7 g/dL    Hematocrit 35.0 (L) 40.0 - 53.0 %    MCV 92 78 - 100 fl    MCH 30.9 26.5 - 33.0 pg    MCHC 33.4 31.5 - 36.5 g/dL    RDW 13.5 10.0 - 15.0 %    Platelet Count 141 (L) 150 - 450 10e9/L    Diff Method Automated Method     % Neutrophils 50.1 %    % Lymphocytes 40.1 %    % Monocytes 5.9 %    % Eosinophils 3.9 %    % Basophils 0.0 %    Absolute Neutrophil 2.3 1.6 - 8.3 10e9/L    Absolute Lymphocytes 1.8 0.8 - 5.3 10e9/L    Absolute Monocytes 0.3 0.0 - 1.3 " 10e9/L    Absolute Eosinophils 0.2 0.0 - 0.7 10e9/L    Absolute Basophils 0.0 0.0 - 0.2 10e9/L   Ferritin   Result Value Ref Range    Ferritin 237 26 - 388 ng/mL   Iron and iron binding capacity   Result Value Ref Range    Iron 78 35 - 180 ug/dL    Iron Binding Cap 262 240 - 430 ug/dL    Iron Saturation Index 30 15 - 46 %   Haptoglobin   Result Value Ref Range    Haptoglobin 146 35 - 175 mg/dL   Lactate Dehydrogenase   Result Value Ref Range    Lactate Dehydrogenase 145 85 - 227 U/L   Comprehensive metabolic panel   Result Value Ref Range    Sodium 137 133 - 144 mmol/L    Potassium 3.6 3.4 - 5.3 mmol/L    Chloride 105 94 - 109 mmol/L    Carbon Dioxide 23 20 - 32 mmol/L    Anion Gap 9 3 - 14 mmol/L    Glucose 121 (H) 70 - 99 mg/dL    Urea Nitrogen 33 (H) 7 - 30 mg/dL    Creatinine 1.69 (H) 0.66 - 1.25 mg/dL    GFR Estimate 39 (L) >60 mL/min/1.7m2    GFR Estimate If Black 48 (L) >60 mL/min/1.7m2    Calcium 9.3 8.5 - 10.1 mg/dL    Bilirubin Total 0.4 0.2 - 1.3 mg/dL    Albumin 2.8 (L) 3.4 - 5.0 g/dL    Protein Total 10.2 (H) 6.8 - 8.8 g/dL    Alkaline Phosphatase 63 40 - 150 U/L    ALT 50 0 - 70 U/L    AST 19 0 - 45 U/L       ASSESSMENT/PLAN:       (F33.9) Episode of recurrent major depressive disorder, unspecified depression episode severity (H)  (primary encounter diagnosis)  Comment: Patient did not tolerate the previously started Remeron anti-depressant medication due to side effect of insomnia.  Plan: I have discontinued the patient's Remeron medication. Patient also declined a referral to outpatient clinical psychology counseling at this time for further evaluation of his depression.      (N18.3) CKD (chronic kidney disease) stage 3, GFR 30-59 ml/min  Comment: Patient's kidney function has worsened recently, likely as a result of chronic high dose ibuprofen pain medication use for control of chronic bilateral foot pain.  Plan: I have discontinued the patient's ibuprofen NSAID's pain medication today. I have also  ordered NEPHROLOGY ADULT REFERRAL for further evaluation going forward.      (D64.9) Anemia, unspecified type  Comment: Patient most recent Hgb and Hct labs have shown improvement from prior baseline. Patient's iron panel also showed no signs of iron deficiency.  Plan: Patient anemia may be due to anemia of chronic disease due to kidney disease and HTN.      (I10) BENIGN HYPERTENSION  Comment: Patient's BP is mildly hypotensive currently on his current BP medication regimen. Patient is also complaining of fatigue due to his hypotension.  Plan: I have decreased the patient losartan (COZAAR) BP medication dose from 100 to 25 MG tablet once daily due his current hypotension, fatigue and dizziness.        (M79.671,  M79.672) Bilateral foot pain  (G60.9) Idiopathic peripheral neuropathy  Comment: Patient has chronic bilateral foot pain due to neuropathy of both feet. Patient declined foot xray or referral to a podiatry clinic for further evaluation of his chronic bilateral foot pains at this time.   Plan: I have also discontinued the patient's ibuprofen NSAID's pain medication today for chronic foot pains control due to worsening kidney function recently with high dose ibuprofen pain medication use. In the meantime, I have advised the patient to switch to low dose tylenol pain medication for foot pain relief going forward.        Follow Up Plan:     Patient is instructed to return to Internal Medicine clinic for follow-up visit in 1 month or sooner as needed.        Jacqueline Kerr MD  Internal Medicine  [facility]

## 2017-11-09 NOTE — TELEPHONE ENCOUNTER
Copied from OV with Dr. Kerr on 10/22/17.    M79.671,  M79.672) Bilateral foot pain  (G60.9) Idiopathic peripheral neuropathy  Comment: Patient has chronic bilateral foot pain due to neuropathy of both feet. Patient declined foot xray or referral to a podiatry clinic for further evaluation of his chronic bilateral foot pains at this time.   Plan: I have also discontinued the patient's ibuprofen NSAID's pain medication today for chronic foot pains control due to worsening kidney function recently with high dose ibuprofen pain medication use. In the meantime, I have advised the patient to switch to low dose tylenol pain medication for foot pain relief going forward.    ------   To  to advise. Do you encourage x-ray, podiatry, other?  Will call patient with your advice.  Cecilia Parham RN

## 2017-11-09 NOTE — TELEPHONE ENCOUNTER
Called and spoke to patient by phone today. Ordered neurology referral for peripheral neuropathy symptoms.

## 2017-11-10 NOTE — NURSING NOTE
"COGNITIVE SCREEN  1) Repeat 3 items (Banana, Sunrise, Chair)    2) Clock draw: NORMAL  3) 3 item recall: Recalls 3 objects  Results: 3 items recalled: COGNITIVE IMPAIRMENT LESS LIKELY    Mini-CogTM Copyright S Maryann. Licensed by the author for use in [facility]; reprinted with permission (tre@[email or web address]). All rights reserved.        Chief Complaint   Patient presents with     Consult     peripheral polyneuropathy       Initial /82 (BP Location: Left arm, Patient Position: Sitting, Cuff Size: Adult Large)  Pulse 92  Temp 97.9  F (36.6  C) (Oral)  Resp 18  Ht 1.88 m (6' 2\")  Wt 89.4 kg (197 lb)  SpO2 99%  BMI 25.29 kg/m2 Estimated body mass index is 25.29 kg/(m^2) as calculated from the following:    Height as of this encounter: 1.88 m (6' 2\").    Weight as of this encounter: 89.4 kg (197 lb).  Medication Reconciliation: irineo Plaza CMA      "

## 2017-11-10 NOTE — LETTER
"    11/10/2017         RE: Guille Aguilar  [address]  [place] [address]        Dear Colleague,    Thank you for referring your patient, Guille Aguilar, to the [facility]. Please see a copy of my visit note below.    INITIAL NEUROLOGY CONSULTATION    DATE OF VISIT: 11/10/2017  CLINIC LOCATION: [facility]  MRN: [id number]  PATIENT NAME: Guille Aguilar  YOB: 1940    PRIMARY CARE PROVIDER: Jacqueline Kerr MD.     REASON FOR VISIT:   Chief Complaint   Patient presents with     Consult     peripheral polyneuropathy     HISTORY OF PRESENT ILLNESS:                                                    Mr. Guille Aguilar is 77 year old right handed male patient with past medical history of coronary artery disease status post myocardial infarction and coronary artery bypass, hypertension, and hyperlipidemia, who was seen in consultation today requested by Jacqueline Kerr MD, for bilateral feet numbness.    Per patient's report, his symptoms of symmetric feet numbness started in 2015. It is located in both of his feet up to his ankles. It feels to him that he walks \"on rocks\". He does not trip over his feet. He denies any recent falls. He denies any associated tingling, significant pain, or focal weakness. In addition, he feels intermittent \"brief nerve zappers\" in both of his arms. Finally, since October 2017 he also feels a lot of pruritus in his back as well as a discomfort and pressure over his chest wall/skin. He did not find any aggravating factors. He feels that sleep makes his symptoms better. He tried dietary changes, supplements, and ibuprofen without much relief.    The recent labs include CMP, notable for elevated BUN at 33, creatinine at 1.69, protein at 10.2, and glucose at 121, and low albumin. Vitamin B12, TSH, folate, and iron studies were unremarkable. His CBC demonstrated low hemoglobin at 11.7 and platelet count at 141. Hemoglobin A1C was " normal in 2011.    No prior brain or spine imaging is available in our system or Care Everywhere. The patient was seen by neurology in 2011 for the right arm pain, which felt musculoskeletal.    He denies a history of recent head injury. Prior neurological history: negative for migraine headaches, stroke, brain neoplasms, seizure disorders, multiple sclerosis, meningitis, encephalitis, and major head injuries.    Neurologic Review of Systems - no amaurosis, diplopia, abnormal speech, unilateral numbness or weakness. He endorses fatigue, recent weight loss, shortness of breath, and memory problems. These complaints have already discussed with other medical providers. Otherwise, he denies any other complaints on 14-point comprehensive review of systems.    PAST MEDICAL/SURGICAL HISTORY:                                                    I personally reviewed patient's past medical and surgical history with the patient at today's visit.  Past Medical History:   Diagnosis Date     CAD (coronary artery disease)     CABG 2011: LIMA to LAD, SVG to OM, SVG Y-graft to posterolateral and PDA, cardiac cath 2011: BMS to OM, cath 2006: medicalmanagment     Dental abscess      Essential hypertension, benign      Glaucoma 12/4/2009     High cholesterol 12/4/2009     Hyperlipidaemia      Left ventricular diastolic dysfunction      Non Q wave myocardial infarction (H)     2006, 2011     Obesity, unspecified      Past Surgical History:   Procedure Laterality Date     CATARACT IOL, RT/LT      left     CORONARY ARTERY BYPASS  2011    CABG 2011: LIMA to LAD, SVG to OM, SVG Y-graft to posterolateral and PDA     HEART CATH, ANGIOPLASTY      2006 OM1, unsuccessful PCI-medical management     MEDICATIONS:                                                    I personally reviewed patient's medications and allergies with the patient at today's visit.  Current Outpatient Prescriptions on File Prior to Visit:  nitroGLYCERIN (NITRO-DUR) 0.4 MG/HR  "Place 1 patch onto the skin as needed.   ASPIRIN 81 MG OR TABS 1 TABLET DAILY   losartan (COZAAR) 25 MG tablet Take 1 tablet (25 mg) by mouth daily (Patient not taking: Reported on 11/10/2017)   hydrochlorothiazide (HYDRODIURIL) 25 MG tablet Take 0.5 tablets (12.5 mg) by mouth daily   metoprolol (TOPROL XL) 100 MG 24 hr tablet Take 1 tablet (100 mg) by mouth daily (Patient not taking: Reported on 11/10/2017)   rosuvastatin (CRESTOR) 40 MG tablet Take 1 tablet (40 mg) by mouth daily (Patient not taking: Reported on 11/10/2017)     ALLERGIES:                                                      Allergies   Allergen Reactions     Dust Mites      No Known Allergies      FAMILY/SOCIAL HISTORY:                                                    Family and social history was reviewed with the patient at today's visit. Family history is positive for stroke (mother).   Problem (# of Occurrences) Relation (Name,Age of Onset)    Other - See Comments (2) Mother (83): old age, Father: fall        , lives with his wife, Cesilia. Denies current tobacco, alcohol, and recreational drug use. Retired  of 30 years.  Social History   Substance Use Topics     Smoking status: Never Smoker     Smokeless tobacco: Never Used     Alcohol use No     REVIEW OF SYSTEMS:                                                    Patient has completed a [facility] Patient Health History, including a 14-system review which was personally reviewed, and pertinent positives are listed in HPI. He denies any additional problems on the further questioning.    EXAM:                                                    VITAL SIGNS:   /82 (BP Location: Left arm, Patient Position: Sitting, Cuff Size: Adult Large)  Pulse 92  Temp 97.9  F (36.6  C) (Oral)  Resp 18  Ht 1.88 m (6' 2\")  Wt 89.4 kg (197 lb)  SpO2 99%  BMI 25.29 kg/m2    General: pt is in NAD, cooperative.  Skin: normal turgor, moist mucous membranes, no lesions/rashes " noticed.  HEENT: ATNC, EOMI, PERRL, white sclera, normal conjunctiva, no nystagmus or ptosis. No carotid bruits bilaterally.  Respiratory: lung sounds clear to auscultation bilaterally, no crackles, wheezes, rhonchi. Symmetric lung excursion, no accessory respiratory muscle use.  Cardiovascular: normal S1/S2, no murmurs/rubs/gallops.  Abdomen: Not distended.  : deferred.    Neurological:  Mental: alert, follows commands, mini-cog is 5/5 with 3/3 on memory recall, no aphasia or dysarthria. Fund of knowledge is appropriate for age.  Cranial Nerves:  CN II: visual acuity - able to accurately count fingers with each eye. Visual fields intact, fundi: discs sharp, no papilledema and normal vessels bilaterally.  CN III, IV, VI: EOM intact, pupils equal and reactive  CN V: facial sensation nl  CN VII: face symmetric, no facial droop  CN VIII: hearing mildly reduced bilaterally  CN IX: palate elevation symmetric, uvula at midline  CN XI SCM normal, shoulder shrug nl  CN XII: tongue midline  Motor: Strength: 5/5 in all major groups of all extremities. Normal tone. No abnormal movements. No pronator drift b/l.  Reflexes: Triceps, biceps, brachioradialis, and patellar reflexes normal and symmetric, trace achilles reflexes bilaterally. No clonus noted. Toes are down-going b/l.   Sensory: temperature, light touch, pinprick, and vibration symmetrically reduced in both feet. Romberg: negative.  Coordination: FNF and heel-shin tests intact b/l. No dysdiadochokinesia with rapid alternating movements.  Gait:  Normal, except moderate difficulty with tandem walk.    DATA:     LABS: I personally reviewed the following labs:  Office Visit on 10/17/2017   Component Date Value Ref Range Status     WBC 10/17/2017 4.6  4.0 - 11.0 10e9/L Final     RBC Count 10/17/2017 3.79* 4.4 - 5.9 10e12/L Final     Hemoglobin 10/17/2017 11.7* 13.3 - 17.7 g/dL Final     Hematocrit 10/17/2017 35.0* 40.0 - 53.0 % Final     MCV 10/17/2017 92  78 - 100 fl  Final     MCH 10/17/2017 30.9  26.5 - 33.0 pg Final     MCHC 10/17/2017 33.4  31.5 - 36.5 g/dL Final     RDW 10/17/2017 13.5  10.0 - 15.0 % Final     Platelet Count 10/17/2017 141* 150 - 450 10e9/L Final     Diff Method 10/17/2017 Automated Method   Final     % Neutrophils 10/17/2017 50.1  % Final     % Lymphocytes 10/17/2017 40.1  % Final     % Monocytes 10/17/2017 5.9  % Final     % Eosinophils 10/17/2017 3.9  % Final     % Basophils 10/17/2017 0.0  % Final     Absolute Neutrophil 10/17/2017 2.3  1.6 - 8.3 10e9/L Final     Absolute Lymphocytes 10/17/2017 1.8  0.8 - 5.3 10e9/L Final     Absolute Monocytes 10/17/2017 0.3  0.0 - 1.3 10e9/L Final     Absolute Eosinophils 10/17/2017 0.2  0.0 - 0.7 10e9/L Final     Absolute Basophils 10/17/2017 0.0  0.0 - 0.2 10e9/L Final     Ferritin 10/17/2017 237  26 - 388 ng/mL Final     Iron 10/17/2017 78  35 - 180 ug/dL Final     Iron Binding Cap 10/17/2017 262  240 - 430 ug/dL Final     Iron Saturation Index 10/17/2017 30  15 - 46 % Final     Haptoglobin 10/17/2017 146  35 - 175 mg/dL Final     Lactate Dehydrogenase 10/17/2017 145  85 - 227 U/L Final     Sodium 10/17/2017 137  133 - 144 mmol/L Final     Potassium 10/17/2017 3.6  3.4 - 5.3 mmol/L Final     Chloride 10/17/2017 105  94 - 109 mmol/L Final     Carbon Dioxide 10/17/2017 23  20 - 32 mmol/L Final     Anion Gap 10/17/2017 9  3 - 14 mmol/L Final     Glucose 10/17/2017 121* 70 - 99 mg/dL Final     Urea Nitrogen 10/17/2017 33* 7 - 30 mg/dL Final     Creatinine 10/17/2017 1.69* 0.66 - 1.25 mg/dL Final     GFR Estimate 10/17/2017 39* >60 mL/min/1.7m2 Final    Non  GFR Calc     GFR Estimate If Black 10/17/2017 48* >60 mL/min/1.7m2 Final    African American GFR Calc     Calcium 10/17/2017 9.3  8.5 - 10.1 mg/dL Final     Bilirubin Total 10/17/2017 0.4  0.2 - 1.3 mg/dL Final     Albumin 10/17/2017 2.8* 3.4 - 5.0 g/dL Final     Protein Total 10/17/2017 10.2* 6.8 - 8.8 g/dL Final     Alkaline Phosphatase 10/17/2017  63  40 - 150 U/L Final     ALT 10/17/2017 50  0 - 70 U/L Final     AST 10/17/2017 19  0 - 45 U/L Final   Office Visit on 10/02/2017   Component Date Value Ref Range Status     WBC 10/02/2017 4.9  4.0 - 11.0 10e9/L Final     RBC Count 10/02/2017 3.73* 4.4 - 5.9 10e12/L Final     Hemoglobin 10/02/2017 11.6* 13.3 - 17.7 g/dL Final     Hematocrit 10/02/2017 34.7* 40.0 - 53.0 % Final     MCV 10/02/2017 93  78 - 100 fl Final     MCH 10/02/2017 31.1  26.5 - 33.0 pg Final     MCHC 10/02/2017 33.4  31.5 - 36.5 g/dL Final     RDW 10/02/2017 13.7  10.0 - 15.0 % Final     Platelet Count 10/02/2017 126* 150 - 450 10e9/L Final     Diff Method 10/02/2017 Automated Method   Final     % Neutrophils 10/02/2017 62.1  % Final     % Lymphocytes 10/02/2017 27.6  % Final     % Monocytes 10/02/2017 6.4  % Final     % Eosinophils 10/02/2017 3.7  % Final     % Basophils 10/02/2017 0.2  % Final     Absolute Neutrophil 10/02/2017 3.0  1.6 - 8.3 10e9/L Final     Absolute Lymphocytes 10/02/2017 1.3  0.8 - 5.3 10e9/L Final     Absolute Monocytes 10/02/2017 0.3  0.0 - 1.3 10e9/L Final     Absolute Eosinophils 10/02/2017 0.2  0.0 - 0.7 10e9/L Final     Absolute Basophils 10/02/2017 0.0  0.0 - 0.2 10e9/L Final     Vitamin B12 10/02/2017 1378* 193 - 986 pg/mL Final     Folate 10/02/2017 15.5  >5.4 ng/mL Final     TSH 10/02/2017 1.56  0.40 - 4.00 mU/L Final   Orders Only on 05/26/2017   Component Date Value Ref Range Status     Sodium 05/26/2017 137  133 - 144 mmol/L Final     Potassium 05/26/2017 4.1  3.4 - 5.3 mmol/L Final     Chloride 05/26/2017 105  94 - 109 mmol/L Final     Carbon Dioxide 05/26/2017 25  20 - 32 mmol/L Final     Anion Gap 05/26/2017 7  3 - 14 mmol/L Final     Glucose 05/26/2017 95  70 - 99 mg/dL Final     Urea Nitrogen 05/26/2017 27  7 - 30 mg/dL Final     Creatinine 05/26/2017 1.16  0.66 - 1.25 mg/dL Final     GFR Estimate 05/26/2017 61  >60 mL/min/1.7m2 Final    Non  GFR Calc     GFR Estimate If Black 05/26/2017  74  >60 mL/min/1.7m2 Final    African American GFR Calc     Calcium 05/26/2017 8.6  8.5 - 10.1 mg/dL Final     Cholesterol 05/26/2017 181  <200 mg/dL Final     Triglycerides 05/26/2017 185* <150 mg/dL Final    Comment: Borderline high:  150-199 mg/dl   High:             200-499 mg/dl   Very high:       >499 mg/dl       HDL Cholesterol 05/26/2017 26* >39 mg/dL Final     LDL Cholesterol Calculated 05/26/2017 118* <100 mg/dL Final    Comment: Above desirable:  100-129 mg/dl   Borderline High:  130-159 mg/dL   High:             160-189 mg/dL   Very high:       >189 mg/dl       Non HDL Cholesterol 05/26/2017 155* <130 mg/dL Final    Comment: Above Desirable:  130-159 mg/dl   Borderline high:  160-189 mg/dl   High:             190-219 mg/dl   Very high:       >219 mg/dl       IMAGING/OTHER STUDIES: I reviewed prior medical records, including Care Everywhere, as detailed in the history of present illness.    ASSESSMENT and PLAN:      ASSESSMENT: Guille Aguilar is a 77 year old male patient with past medical history of coronary artery disease status post myocardial infarction and coronary artery bypass, hypertension, and hyperlipidemia, who presents with bilateral feet numbness started in 2015.    As we thoroughly discussed with the patient, his neurological exam today is suggestive of possible peripheral neuropathy. I added several additional screening labs and EMG to evaluate further. At the same time, we discussed that not all of his symptoms would fit the classic description of neuropathy, especially his chest wall/skin discomfort and back pruritus. I recommended the patient to discuss it further with his primary care provider. The rest of the plan is detailed below.    DIAGNOSES:    ICD-10-CM    1. Numbness in feet R20.0 Lyme Disease Jaleesa with reflex to WB Serum     Vitamin E     Vitamin B1 whole blood     Protein electrophoresis     Protein electrophoresis random urine     Erythrocyte sedimentation rate auto      Hemoglobin A1c     Protein Immunofixation Serum     EMG     Vitamin B6     Protein immunofixation urine     Anti Nuclear Jaleesa IgG by IFA with Reflex     PLAN: At today's visit we thoroughly discussed various diagnostic possibilities for his symptoms and the reasons for work-up, which includes:  Orders Placed This Encounter   Procedures     Lyme Disease Jaleesa with reflex to WB Serum     Vitamin E     Vitamin B1 whole blood     Protein electrophoresis     Protein electrophoresis random urine     Erythrocyte sedimentation rate auto     Hemoglobin A1C     Protein Immunofixation Serum     Vitamin B6     Protein immunofixation urine     Anti Nuclear Jaleesa IgG by IFA with Reflex     EMG     No new medications were ordered.    Additional recommendations after the work-up.    Next follow-up appointment is in the next 4-8 weeks or earlier if needed.    I advised the patient to call me with any questions or concerns.    Total Time:  63 minutes with > 50% spent counseling the patient on stated above assessment and recommendations, including nature of the diagnosis, needed w/u, and proposed plan. Additional time was used to answer patient's questions.    Aramis Olmos MD  / Neurology  Reading  (Chart documentation was completed in part with Dragon voice-recognition software. Even though reviewed, some grammatical, spelling, and word errors may remain.)            Again, thank you for allowing me to participate in the care of your patient.        Sincerely,        Aramis Olmos MD

## 2017-11-10 NOTE — TELEPHONE ENCOUNTER
One of the labs came back that showed an increased sedimentation rate, meaning inflammation in the body somewhere.  Patient should follow up with Dr. Kerr for further evaluation.  We will contact patient with lab results once they are all in.    Nolvia Plaza CMA

## 2017-11-10 NOTE — TELEPHONE ENCOUNTER
Reason for Call:  Other     Detailed comments: Patient had an appointment at another clinic  And wants a call from Dr Kerr to relate what was found, he said  They found substance something in his blood he doesn't remember   the second word.    Phone Number Patient can be reached at: Home number on file [phone] (home)    Best Time: anytime    Can we leave a detailed message on this number? YES    Call taken on 11/10/2017 at 4:08 PM by Dee Dee Briseno

## 2017-11-10 NOTE — PROGRESS NOTES
"INITIAL NEUROLOGY CONSULTATION    DATE OF VISIT: 11/10/2017  CLINIC LOCATION: [facility]  MRN: [id number]  PATIENT NAME: Guille Aguilar  YOB: 1940    PRIMARY CARE PROVIDER: Jacqueline Kerr MD.     REASON FOR VISIT:   Chief Complaint   Patient presents with     Consult     peripheral polyneuropathy     HISTORY OF PRESENT ILLNESS:                                                    Mr. Guille Aguilar is 77 year old right handed male patient with past medical history of coronary artery disease status post myocardial infarction and coronary artery bypass, hypertension, and hyperlipidemia, who was seen in consultation today requested by Jacqueline Kerr MD, for bilateral feet numbness.    Per patient's report, his symptoms of symmetric feet numbness started in 2015. It is located in both of his feet up to his ankles. It feels to him that he walks \"on rocks\". He does not trip over his feet. He denies any recent falls. He denies any associated tingling, significant pain, or focal weakness. In addition, he feels intermittent \"brief nerve zappers\" in both of his arms. Finally, since October 2017 he also feels a lot of pruritus in his back as well as a discomfort and pressure over his chest wall/skin. He did not find any aggravating factors. He feels that sleep makes his symptoms better. He tried dietary changes, supplements, and ibuprofen without much relief.    The recent labs include CMP, notable for elevated BUN at 33, creatinine at 1.69, protein at 10.2, and glucose at 121, and low albumin. Vitamin B12, TSH, folate, and iron studies were unremarkable. His CBC demonstrated low hemoglobin at 11.7 and platelet count at 141. Hemoglobin A1C was normal in 2011.    No prior brain or spine imaging is available in our system or Care Everywhere. The patient was seen by neurology in 2011 for the right arm pain, which felt musculoskeletal.    He denies a history of recent head injury. Prior " neurological history: negative for migraine headaches, stroke, brain neoplasms, seizure disorders, multiple sclerosis, meningitis, encephalitis, and major head injuries.    Neurologic Review of Systems - no amaurosis, diplopia, abnormal speech, unilateral numbness or weakness. He endorses fatigue, recent weight loss, shortness of breath, and memory problems. These complaints have already discussed with other medical providers. Otherwise, he denies any other complaints on 14-point comprehensive review of systems.    PAST MEDICAL/SURGICAL HISTORY:                                                    I personally reviewed patient's past medical and surgical history with the patient at today's visit.  Past Medical History:   Diagnosis Date     CAD (coronary artery disease)     CABG 2011: LIMA to LAD, SVG to OM, SVG Y-graft to posterolateral and PDA, cardiac cath 2011: BMS to OM, cath 2006: medicalmanagment     Dental abscess      Essential hypertension, benign      Glaucoma 12/4/2009     High cholesterol 12/4/2009     Hyperlipidaemia      Left ventricular diastolic dysfunction      Non Q wave myocardial infarction (H)     2006, 2011     Obesity, unspecified      Past Surgical History:   Procedure Laterality Date     CATARACT IOL, RT/LT      left     CORONARY ARTERY BYPASS  2011    CABG 2011: LIMA to LAD, SVG to OM, SVG Y-graft to posterolateral and PDA     HEART CATH, ANGIOPLASTY      2006 OM1, unsuccessful PCI-medical management     MEDICATIONS:                                                    I personally reviewed patient's medications and allergies with the patient at today's visit.  Current Outpatient Prescriptions on File Prior to Visit:  nitroGLYCERIN (NITRO-DUR) 0.4 MG/HR Place 1 patch onto the skin as needed.   ASPIRIN 81 MG OR TABS 1 TABLET DAILY   losartan (COZAAR) 25 MG tablet Take 1 tablet (25 mg) by mouth daily (Patient not taking: Reported on 11/10/2017)   hydrochlorothiazide (HYDRODIURIL) 25 MG tablet  "Take 0.5 tablets (12.5 mg) by mouth daily   metoprolol (TOPROL XL) 100 MG 24 hr tablet Take 1 tablet (100 mg) by mouth daily (Patient not taking: Reported on 11/10/2017)   rosuvastatin (CRESTOR) 40 MG tablet Take 1 tablet (40 mg) by mouth daily (Patient not taking: Reported on 11/10/2017)     ALLERGIES:                                                      Allergies   Allergen Reactions     Dust Mites      No Known Allergies      FAMILY/SOCIAL HISTORY:                                                    Family and social history was reviewed with the patient at today's visit. Family history is positive for stroke (mother).   Problem (# of Occurrences) Relation (Name,Age of Onset)    Other - See Comments (2) Mother (83): old age, Father: fall        , lives with his wife, Cesilia. Denies current tobacco, alcohol, and recreational drug use. Retired  of 30 years.  Social History   Substance Use Topics     Smoking status: Never Smoker     Smokeless tobacco: Never Used     Alcohol use No     REVIEW OF SYSTEMS:                                                    Patient has completed a [facility] Patient Health History, including a 14-system review which was personally reviewed, and pertinent positives are listed in HPI. He denies any additional problems on the further questioning.    EXAM:                                                    VITAL SIGNS:   /82 (BP Location: Left arm, Patient Position: Sitting, Cuff Size: Adult Large)  Pulse 92  Temp 97.9  F (36.6  C) (Oral)  Resp 18  Ht 1.88 m (6' 2\")  Wt 89.4 kg (197 lb)  SpO2 99%  BMI 25.29 kg/m2    General: pt is in NAD, cooperative.  Skin: normal turgor, moist mucous membranes, no lesions/rashes noticed.  HEENT: ATNC, EOMI, PERRL, white sclera, normal conjunctiva, no nystagmus or ptosis. No carotid bruits bilaterally.  Respiratory: lung sounds clear to auscultation bilaterally, no crackles, wheezes, rhonchi. Symmetric lung excursion, " no accessory respiratory muscle use.  Cardiovascular: normal S1/S2, no murmurs/rubs/gallops.  Abdomen: Not distended.  : deferred.    Neurological:  Mental: alert, follows commands, mini-cog is 5/5 with 3/3 on memory recall, no aphasia or dysarthria. Fund of knowledge is appropriate for age.  Cranial Nerves:  CN II: visual acuity - able to accurately count fingers with each eye. Visual fields intact, fundi: discs sharp, no papilledema and normal vessels bilaterally.  CN III, IV, VI: EOM intact, pupils equal and reactive  CN V: facial sensation nl  CN VII: face symmetric, no facial droop  CN VIII: hearing mildly reduced bilaterally  CN IX: palate elevation symmetric, uvula at midline  CN XI SCM normal, shoulder shrug nl  CN XII: tongue midline  Motor: Strength: 5/5 in all major groups of all extremities. Normal tone. No abnormal movements. No pronator drift b/l.  Reflexes: Triceps, biceps, brachioradialis, and patellar reflexes normal and symmetric, trace achilles reflexes bilaterally. No clonus noted. Toes are down-going b/l.   Sensory: temperature, light touch, pinprick, and vibration symmetrically reduced in both feet. Romberg: negative.  Coordination: FNF and heel-shin tests intact b/l. No dysdiadochokinesia with rapid alternating movements.  Gait:  Normal, except moderate difficulty with tandem walk.    DATA:     LABS: I personally reviewed the following labs:  Office Visit on 10/17/2017   Component Date Value Ref Range Status     WBC 10/17/2017 4.6  4.0 - 11.0 10e9/L Final     RBC Count 10/17/2017 3.79* 4.4 - 5.9 10e12/L Final     Hemoglobin 10/17/2017 11.7* 13.3 - 17.7 g/dL Final     Hematocrit 10/17/2017 35.0* 40.0 - 53.0 % Final     MCV 10/17/2017 92  78 - 100 fl Final     MCH 10/17/2017 30.9  26.5 - 33.0 pg Final     MCHC 10/17/2017 33.4  31.5 - 36.5 g/dL Final     RDW 10/17/2017 13.5  10.0 - 15.0 % Final     Platelet Count 10/17/2017 141* 150 - 450 10e9/L Final     Diff Method 10/17/2017 Automated Method    Final     % Neutrophils 10/17/2017 50.1  % Final     % Lymphocytes 10/17/2017 40.1  % Final     % Monocytes 10/17/2017 5.9  % Final     % Eosinophils 10/17/2017 3.9  % Final     % Basophils 10/17/2017 0.0  % Final     Absolute Neutrophil 10/17/2017 2.3  1.6 - 8.3 10e9/L Final     Absolute Lymphocytes 10/17/2017 1.8  0.8 - 5.3 10e9/L Final     Absolute Monocytes 10/17/2017 0.3  0.0 - 1.3 10e9/L Final     Absolute Eosinophils 10/17/2017 0.2  0.0 - 0.7 10e9/L Final     Absolute Basophils 10/17/2017 0.0  0.0 - 0.2 10e9/L Final     Ferritin 10/17/2017 237  26 - 388 ng/mL Final     Iron 10/17/2017 78  35 - 180 ug/dL Final     Iron Binding Cap 10/17/2017 262  240 - 430 ug/dL Final     Iron Saturation Index 10/17/2017 30  15 - 46 % Final     Haptoglobin 10/17/2017 146  35 - 175 mg/dL Final     Lactate Dehydrogenase 10/17/2017 145  85 - 227 U/L Final     Sodium 10/17/2017 137  133 - 144 mmol/L Final     Potassium 10/17/2017 3.6  3.4 - 5.3 mmol/L Final     Chloride 10/17/2017 105  94 - 109 mmol/L Final     Carbon Dioxide 10/17/2017 23  20 - 32 mmol/L Final     Anion Gap 10/17/2017 9  3 - 14 mmol/L Final     Glucose 10/17/2017 121* 70 - 99 mg/dL Final     Urea Nitrogen 10/17/2017 33* 7 - 30 mg/dL Final     Creatinine 10/17/2017 1.69* 0.66 - 1.25 mg/dL Final     GFR Estimate 10/17/2017 39* >60 mL/min/1.7m2 Final    Non  GFR Calc     GFR Estimate If Black 10/17/2017 48* >60 mL/min/1.7m2 Final    African American GFR Calc     Calcium 10/17/2017 9.3  8.5 - 10.1 mg/dL Final     Bilirubin Total 10/17/2017 0.4  0.2 - 1.3 mg/dL Final     Albumin 10/17/2017 2.8* 3.4 - 5.0 g/dL Final     Protein Total 10/17/2017 10.2* 6.8 - 8.8 g/dL Final     Alkaline Phosphatase 10/17/2017 63  40 - 150 U/L Final     ALT 10/17/2017 50  0 - 70 U/L Final     AST 10/17/2017 19  0 - 45 U/L Final   Office Visit on 10/02/2017   Component Date Value Ref Range Status     WBC 10/02/2017 4.9  4.0 - 11.0 10e9/L Final     RBC Count 10/02/2017  3.73* 4.4 - 5.9 10e12/L Final     Hemoglobin 10/02/2017 11.6* 13.3 - 17.7 g/dL Final     Hematocrit 10/02/2017 34.7* 40.0 - 53.0 % Final     MCV 10/02/2017 93  78 - 100 fl Final     MCH 10/02/2017 31.1  26.5 - 33.0 pg Final     MCHC 10/02/2017 33.4  31.5 - 36.5 g/dL Final     RDW 10/02/2017 13.7  10.0 - 15.0 % Final     Platelet Count 10/02/2017 126* 150 - 450 10e9/L Final     Diff Method 10/02/2017 Automated Method   Final     % Neutrophils 10/02/2017 62.1  % Final     % Lymphocytes 10/02/2017 27.6  % Final     % Monocytes 10/02/2017 6.4  % Final     % Eosinophils 10/02/2017 3.7  % Final     % Basophils 10/02/2017 0.2  % Final     Absolute Neutrophil 10/02/2017 3.0  1.6 - 8.3 10e9/L Final     Absolute Lymphocytes 10/02/2017 1.3  0.8 - 5.3 10e9/L Final     Absolute Monocytes 10/02/2017 0.3  0.0 - 1.3 10e9/L Final     Absolute Eosinophils 10/02/2017 0.2  0.0 - 0.7 10e9/L Final     Absolute Basophils 10/02/2017 0.0  0.0 - 0.2 10e9/L Final     Vitamin B12 10/02/2017 1378* 193 - 986 pg/mL Final     Folate 10/02/2017 15.5  >5.4 ng/mL Final     TSH 10/02/2017 1.56  0.40 - 4.00 mU/L Final   Orders Only on 05/26/2017   Component Date Value Ref Range Status     Sodium 05/26/2017 137  133 - 144 mmol/L Final     Potassium 05/26/2017 4.1  3.4 - 5.3 mmol/L Final     Chloride 05/26/2017 105  94 - 109 mmol/L Final     Carbon Dioxide 05/26/2017 25  20 - 32 mmol/L Final     Anion Gap 05/26/2017 7  3 - 14 mmol/L Final     Glucose 05/26/2017 95  70 - 99 mg/dL Final     Urea Nitrogen 05/26/2017 27  7 - 30 mg/dL Final     Creatinine 05/26/2017 1.16  0.66 - 1.25 mg/dL Final     GFR Estimate 05/26/2017 61  >60 mL/min/1.7m2 Final    Non  GFR Calc     GFR Estimate If Black 05/26/2017 74  >60 mL/min/1.7m2 Final    African American GFR Calc     Calcium 05/26/2017 8.6  8.5 - 10.1 mg/dL Final     Cholesterol 05/26/2017 181  <200 mg/dL Final     Triglycerides 05/26/2017 185* <150 mg/dL Final    Comment: Borderline high:  150-199  mg/dl   High:             200-499 mg/dl   Very high:       >499 mg/dl       HDL Cholesterol 05/26/2017 26* >39 mg/dL Final     LDL Cholesterol Calculated 05/26/2017 118* <100 mg/dL Final    Comment: Above desirable:  100-129 mg/dl   Borderline High:  130-159 mg/dL   High:             160-189 mg/dL   Very high:       >189 mg/dl       Non HDL Cholesterol 05/26/2017 155* <130 mg/dL Final    Comment: Above Desirable:  130-159 mg/dl   Borderline high:  160-189 mg/dl   High:             190-219 mg/dl   Very high:       >219 mg/dl       IMAGING/OTHER STUDIES: I reviewed prior medical records, including Care Everywhere, as detailed in the history of present illness.    ASSESSMENT and PLAN:      ASSESSMENT: Guille Aguilar is a 77 year old male patient with past medical history of coronary artery disease status post myocardial infarction and coronary artery bypass, hypertension, and hyperlipidemia, who presents with bilateral feet numbness started in 2015.    As we thoroughly discussed with the patient, his neurological exam today is suggestive of possible peripheral neuropathy. I added several additional screening labs and EMG to evaluate further. At the same time, we discussed that not all of his symptoms would fit the classic description of neuropathy, especially his chest wall/skin discomfort and back pruritus. I recommended the patient to discuss it further with his primary care provider. The rest of the plan is detailed below.    DIAGNOSES:    ICD-10-CM    1. Numbness in feet R20.0 Lyme Disease Jaleesa with reflex to WB Serum     Vitamin E     Vitamin B1 whole blood     Protein electrophoresis     Protein electrophoresis random urine     Erythrocyte sedimentation rate auto     Hemoglobin A1c     Protein Immunofixation Serum     EMG     Vitamin B6     Protein immunofixation urine     Anti Nuclear Jaleesa IgG by IFA with Reflex     PLAN: At today's visit we thoroughly discussed various diagnostic possibilities for his symptoms  and the reasons for work-up, which includes:  Orders Placed This Encounter   Procedures     Lyme Disease Jaleesa with reflex to WB Serum     Vitamin E     Vitamin B1 whole blood     Protein electrophoresis     Protein electrophoresis random urine     Erythrocyte sedimentation rate auto     Hemoglobin A1C     Protein Immunofixation Serum     Vitamin B6     Protein immunofixation urine     Anti Nuclear Jaleesa IgG by IFA with Reflex     EMG     No new medications were ordered.    Additional recommendations after the work-up.    Next follow-up appointment is in the next 4-8 weeks or earlier if needed.    I advised the patient to call me with any questions or concerns.    Total Time:  63 minutes with > 50% spent counseling the patient on stated above assessment and recommendations, including nature of the diagnosis, needed w/u, and proposed plan. Additional time was used to answer patient's questions.    Aramis Olmos MD  / Neurology  [place]  (Chart documentation was completed in part with Dragon voice-recognition software. Even though reviewed, some grammatical, spelling, and word errors may remain.)

## 2017-11-10 NOTE — MR AVS SNAPSHOT
After Visit Summary   11/10/2017    Guille Aguilar    MRN: [id number]           Patient Information     Date Of Birth          1940        Visit Information        Provider Department      11/10/2017 2:00 PM Aramis Olmos MD [facility]        Today's Diagnoses     Numbness in feet    -  1      Care Instructions    AFTER VISIT SUMMARY (AVS):    At today's visit we discussed various diagnostic possibilities for your symptoms and the reasons for work-up, which includes:  Orders Placed This Encounter   Procedures     Lyme Disease Jaleesa with reflex to WB Serum     Vitamin E     Vitamin B1 whole blood     Protein electrophoresis     Protein electrophoresis random urine     Erythrocyte sedimentation rate auto     Hemoglobin A1c     Protein Immunofixation Serum     Vitamin B6     Protein immunofixation urine     Anti Nuclear Jaleesa IgG by IFA with Reflex     EMG     No new medications were ordered.    Additional recommendations after the work-up.    Next follow-up appointment is in the next 4-8 weeks or earlier if needed.    Please do not hesitate to call me with any questions or concerns.    Thanks.            Follow-ups after your visit        Follow-up notes from your care team     Return in about 4 weeks (around 12/8/2017).      Your next 10 appointments already scheduled     Dec 12, 2017  1:45 PM CST   (Arrive by 1:30 PM)   EMG with Juvencio Mak MD   [facility] ([facility])    [address] [id number] [phone]           Do not use lotions or creams on the area to be tested. If you are on blood thinners (Warfarin, Coumadin, Lovenox, etc), please contact your primary care physician to check if it is safe to stop them 3 days prior to testing. If you have anxiety, please check with your referring physician to obtain anti-anxiety medication for the procedure.            Dec 15, 2017 12:30  "PM CST   [facility]    [facility] [facility] [address] [phone]              Future tests that were ordered for you today     Open Future Orders        Priority Expected Expires Ordered    EMG Routine  11/10/2018 11/10/2017            Who to contact     If you have questions or need follow up information about today's clinic visit or your schedule please contact [facility] directly at [phone].  Normal or non-critical lab and imaging results will be communicated to you by Betfairhart, letter or phone within 4 business days after the clinic has received the results. If you do not hear from us within 7 days, please contact the clinic through Betfairhart or phone. If you have a critical or abnormal lab result, we will notify you by phone as soon as possible.  Submit refill requests through [facility]vine or call your pharmacy and they will forward the refill request to us. Please allow 3 business days for your refill to be completed.          Additional Information About Your Visit        MyChart Information     Sandvine lets you send messages to your doctor, view your test results, renew your prescriptions, schedule appointments and more. To sign up, go to [email or web address] . Click on \"Log in\" on the left side of the screen, which will take you to the Welcome page. Then click on \"Sign up Now\" on the right side of the page.     You will be asked to enter the access code listed below, as well as some personal information. Please follow the directions to create your username and password.     Your access code is: [email or web address]  Expires: 2017 11:32 AM     Your access code will  in 90 days. If you need help or a new code, please call your [facility] or [phone].        Care EveryWhere ID     This is your Care EveryWhere ID. This could be used by other organizations to " "access your [facility] medical records  [id number]        Your Vitals Were     Pulse Temperature Respirations Height Pulse Oximetry BMI (Body Mass Index)    92 97.9  F (36.6  C) (Oral) 18 1.88 m (6' 2\") 99% 25.29 kg/m2       Blood Pressure from Last 3 Encounters:   11/10/17 120/82   10/20/17 113/72   10/17/17 113/74    Weight from Last 3 Encounters:   11/10/17 89.4 kg (197 lb)   10/20/17 89.4 kg (197 lb)   10/17/17 88.9 kg (196 lb)              We Performed the Following     Anti Nuclear Jaleesa IgG by IFA with Reflex     Erythrocyte sedimentation rate auto     Hemoglobin A1c     Lyme Disease Jaleesa with reflex to WB Serum     Protein electrophoresis random urine     Protein electrophoresis     Protein Immunofixation Serum     Protein immunofixation urine     Vitamin B1 whole blood     Vitamin B6     Vitamin E        Primary Care Provider Office Phone # Fax #    Jacqueline Franco Kerr [phone] [address] [facility] [address]  [place] [address]        Equal Access to Services     [facility]: Hadii aad ku hadasho Soomaali, waaxda luqadaha, qaybta kaalmada adeegyamaurizio, geneva lilly . So [facility] [phone].    ATENCIÓN: Si habla español, tiene a puri disposición servicios gratuitos de asistencia lingüística. Llame al [phone].    We comply with applicable federal civil rights laws and [place] laws. We do not discriminate on the basis of race, color, national origin, age, disability, sex, sexual orientation, or gender identity.            Thank you!     Thank you for choosing [facility]  for your care. Our goal is always to provide you with excellent care. Hearing back from our patients is one way we can continue to improve our services. Please take a few minutes to complete the written survey that you may receive in the mail after your visit with us. Thank you!             Your Updated Medication List - Protect others around you: Learn how to safely use, store and " throw away your medicines at www.disposemymeds.org.          This list is accurate as of: 11/10/17  2:25 PM.  Always use your most recent med list.                   Brand Name Dispense Instructions for use Diagnosis    aspirin 81 MG tablet      1 TABLET DAILY        hydrochlorothiazide 25 MG tablet    HYDRODIURIL    45 tablet    Take 0.5 tablets (12.5 mg) by mouth daily    Essential hypertension, benign, Coronary artery disease involving native coronary artery of native heart without angina pectoris       IRON SUPPLEMENT PO      Take 1 tablet by mouth daily (with breakfast)        losartan 25 MG tablet    COZAAR    30 tablet    Take 1 tablet (25 mg) by mouth daily    Essential hypertension, benign       metoprolol 100 MG 24 hr tablet    TOPROL XL    90 tablet    Take 1 tablet (100 mg) by mouth daily    Coronary artery disease involving native coronary artery of native heart without angina pectoris       NITRO-DUR 0.4 MG/HR 24 hr patch   Generic drug:  nitroGLYcerin      Place 1 patch onto the skin as needed.        rosuvastatin 40 MG tablet    CRESTOR    90 tablet    Take 1 tablet (40 mg) by mouth daily    Coronary artery disease involving native coronary artery of native heart without angina pectoris

## 2017-11-13 NOTE — MR AVS SNAPSHOT
After Visit Summary   11/13/2017    Guille Aguilar    MRN: [id number]           Patient Information     Date Of Birth          1940        Visit Information        Provider Department      11/13/2017 9:00 AM Jacqueline Kerr MD [facility]        Today's Diagnoses     Elevated erythrocyte sedimentation rate    -  1    Anemia, unspecified type        Hypotension due to drugs        Mixed hyperlipidemia           Follow-ups after your visit        Additional Services     ONC/HEME ADULT REFERRAL       Your provider has referred you to: [facility]: Cancer Care/Hematology (All Cancer Related Services) - [place] [phone]   [email or web address]    Please be aware that coverage of these services is subject to the terms and limitations of your health insurance plan.  Call member services at your health plan with any benefit or coverage questions.      Please bring the following with you to your appointment:    (1) Any X-Rays, CTs or MRIs which have been performed.  Contact the facility where they were done to arrange for  prior to your scheduled appointment.   (2) List of current medications  (3) This referral request   (4) Any documents/labs given to you for this referral                  Follow-up notes from your care team     Return in about 1 week (around 11/20/2017).      Your next 10 appointments already scheduled     Nov 14, 2017 12:15 PM CST   CT CHEST ABDOMEN PELVIS W/O CONTRAST with SHCT1   [facility] CT ([facility])    [address] [place] [id number] [phone]           Please bring any scans or X-rays taken at other hospitals, if similar tests were done. Also bring a list of your medicines, including vitamins, minerals and over-the-counter drugs. It is safest to leave personal items at home.  Be sure to tell your doctor:   If you have any allergies.   If there s any chance you are  pregnant.   If you are breastfeeding.   If you have any special needs.  You may have contrast for this exam. To prepare:   Do not eat or drink for 2 hours before your exam. If you need to take medicine, you may take it with small sips of water. (We may ask you to take liquid medicine as well.)   The day before your exam, drink extra fluids at least six 8-ounce glasses (unless your doctor tells you to restrict your fluids).  Patients over 70 or patients with diabetes or kidney problems:   If you haven t had a blood test (creatinine test) within the last 30 days, go to your clinic or Diagnostic Imaging Department for this test.  If you have diabetes:   If your kidney function is normal, continue taking your metformin (Avandamet, Glucophage, Glucovance, Metaglip) on the day of your exam.   If your kidney function is abnormal, wait 48 hours before restarting this medicine.  You will have oral contrast for this exam:   You will drink the contrast at home. Get this from your clinic or Diagnostic Imaging Department. Please follow the directions given.  Please wear loose clothing, such as a sweat suit or jogging clothes. Avoid snaps, zippers and other metal. We may ask you to undress and put on a hospital gown.  If you have any questions, please call the Imaging Department where you will have your exam.            Nov 15, 2017  3:30 PM CST   New Visit with Tony Tavarez MD   [facility] ([facility])    [facility]  [address] [facility] [address] [facility] [address]  [facility] [address] [phone]            Nov 20, 2017  2:30 PM CST   Office Visit with Jacqueline Kerr MD   [facility] ([facility])    [address] [facility] [address] [phone]           Bring a current list of meds and any records pertaining to this visit. For Physicals, please bring immunization records and any forms needing to be filled out. Please arrive 10 minutes early to complete  paperwork.            Dec 12, 2017  1:45 PM CST   (Arrive by 1:30 PM)   EMG with Juvencio Mak MD   [facility] ([facility])    [address] [facility]  [address]rd [facility] [address] [phone]           Do not use lotions or creams on the area to be tested. If you are on blood thinners (Warfarin, Coumadin, Lovenox, etc), please contact your primary care physician to check if it is safe to stop them 3 days prior to testing. If you have anxiety, please check with your referring physician to obtain anti-anxiety medication for the procedure.            Dec 15, 2017 12:30 PM CST   [facility]    [address] [place] [address] [phone]              Future tests that were ordered for you today     Open Future Orders        Priority Expected Expires Ordered    CT Chest Abdomen Pelvis w/o Contrast Routine  11/13/2018 11/13/2017            Who to contact     If you have questions or need follow up information about today's clinic visit or your schedule please contact [facility] directly at [phone].  Normal or non-critical lab and imaging results will be communicated to you by MyChart, letter or phone within 4 business days after the clinic has received the results. If you do not hear from us within 7 days, please contact the clinic through MyChart or phone. If you have a critical or abnormal lab result, we will notify you by phone as soon as possible.  Submit refill requests through mobiDEOS or call your pharmacy and they will forward the refill request to us. Please allow 3 business days for your refill to be completed.          Additional Information About Your Visit        Care EveryWhere ID     This is your Care EveryWhere ID. This could be used by other organizations to access your [place] medical records  [id number]        Your  "Vitals Were     Pulse Temperature Height Pulse Oximetry BMI (Body Mass Index)       103 97.5  F (36.4  C) (Oral) 6' 2\" (1.88 m) 98% 24.69 kg/m2        Blood Pressure from Last 3 Encounters:   11/13/17 (!) 80/50   11/10/17 120/82   10/20/17 113/72    Weight from Last 3 Encounters:   11/13/17 192 lb 4.8 oz (87.2 kg)   11/10/17 197 lb (89.4 kg)   10/20/17 197 lb (89.4 kg)              We Performed the Following     Creatinine     ONC/HEME ADULT REFERRAL        Primary Care Provider Office Phone # Fax #    Jacqueline Franco Kerr [phone] [address] [address]        Equal Access to Services     : Hadii inna cottono [place], waaxda luqadaha, qaybta kaalmada khadar, geneva lilly . So [facility] [phone].    ATENCIÓN: Si habla español, tiene a puri disposición servicios gratuitos de asistencia lingüística. Anabel al [phone].    We comply with applicable federal civil rights laws and [place] laws. We do not discriminate on the basis of race, color, national origin, age, disability, sex, sexual orientation, or gender identity.            Thank you!     Thank you for choosing [facility]  for your care. Our goal is always to provide you with excellent care. Hearing back from our patients is one way we can continue to improve our services. Please take a few minutes to complete the written survey that you may receive in the mail after your visit with us. Thank you!             Your Updated Medication List - Protect others around you: Learn how to safely use, store and throw away your medicines at www.disposemymeds.org.          This list is accurate as of: 11/13/17 10:09 AM.  Always use your most recent med list.                   Brand Name Dispense Instructions for use Diagnosis    aspirin 81 MG tablet      1 TABLET DAILY        IRON SUPPLEMENT PO      Take 1 tablet by mouth daily (with breakfast)        NITRO-DUR 0.4 MG/HR 24 " hr patch   Generic drug:  nitroGLYcerin      Place 1 patch onto the skin as needed.        rosuvastatin 40 MG tablet    CRESTOR    90 tablet    Take 1 tablet (40 mg) by mouth daily    Coronary artery disease involving native coronary artery of native heart without angina pectoris

## 2017-11-13 NOTE — PROGRESS NOTES
Chief Complaint:     Follow up on multiple concerns including recent new lab result of elevated ESR of unclear etiology    HPI:   Patient Guille Aguilar is a very pleasant 77 year old male with history of peripheral neuropathy, chronic anemia for many years of unclear etiology who presents to Internal Medicine clinic today for follow up on multiple concerns including recent new lab result of elevated ESR of unclear etiology. Regarding the patient's previous diagnosis of chronic HTN, the Patient's BP is currently hypotensive causing dizziness. Patient has already previous discontinued his BP medications on his own. Regarding the patient's chronic hyperlipidemia, the patient is maintained on Crestor medication. Patient recently was evaluated by neurology clinic for chronic peripheral neuropathy, labs drawn at the Neurology clinic appointment last week shows elevated ESR of unclear etiology. Patient denies any current chest pain, abdominal pain, fever or chills.         Current Medications:     Current Outpatient Prescriptions   Medication Sig Dispense Refill     Ferrous Sulfate (IRON SUPPLEMENT PO) Take 1 tablet by mouth daily (with breakfast)       rosuvastatin (CRESTOR) 40 MG tablet Take 1 tablet (40 mg) by mouth daily (Patient not taking: Reported on 11/13/2017) 90 tablet 3     nitroGLYCERIN (NITRO-DUR) 0.4 MG/HR Place 1 patch onto the skin as needed.       ASPIRIN 81 MG OR TABS 1 TABLET DAILY           Allergies:      Allergies   Allergen Reactions     Dust Mites      No Known Allergies             Past Medical History:     Past Medical History:   Diagnosis Date     CAD (coronary artery disease)     CABG 2011: LIMA to LAD, SVG to OM, SVG Y-graft to posterolateral and PDA, cardiac cath 2011: BMS to OM, cath 2006: medicalmanagment     Dental abscess      Essential hypertension, benign      Glaucoma 12/4/2009     High cholesterol 12/4/2009     Hyperlipidaemia      Left ventricular diastolic dysfunction      Non Q  wave myocardial infarction (H)     2006, 2011     Obesity, unspecified          Past Surgical History:     Past Surgical History:   Procedure Laterality Date     CATARACT IOL, RT/LT      left     CORONARY ARTERY BYPASS  2011    CABG 2011: LIMA to LAD, SVG to OM, SVG Y-graft to posterolateral and PDA     HEART CATH, ANGIOPLASTY      2006 OM1, unsuccessful PCI-medical management         Family Medical History:     Family History   Problem Relation Age of Onset     Other - See Comments Mother 83     old age     Other - See Comments Father      fall         Social History:     Social History     Social History     Marital status:      Spouse name: N/A     Number of children: N/A     Years of education: N/A     Occupational History     Not on file.     Social History Main Topics     Smoking status: Never Smoker     Smokeless tobacco: Never Used     Alcohol use No     Drug use: No     Sexual activity: Not Currently     Partners: Female     Other Topics Concern     Caffeine Concern Yes     2 cups daily of coffee     Sleep Concern No     Stress Concern No     Weight Concern No     Special Diet No     Exercise Yes     walking in the mall     Parent/Sibling W/ Cabg, Mi Or Angioplasty Before 65f 55m? Yes     Social History Narrative           Review of System:     Constitutional: Negative for fever or chills  Skin: Negative for rashes  Ears/Nose/Throat: Negative for nasal congestion, sore throat  Respiratory: No shortness of breath, dyspnea on exertion, cough, or hemoptysis  Cardiovascular: Negative for chest pain  Gastrointestinal: Negative for nausea, vomiting  Genitourinary: Negative for dysuria, hematuria  Musculoskeletal: Negative for myalgias  Neurologic: Negative for headaches. Positive for dizziness and peripheral neuropathy in both feet  Psychiatric: Negative for depression, anxiety  Hematologic/Lymphatic/Immunologic: positive for chronic anemia of unclear etiology for many years duration with a family history  "of chronic anemia with his father  Endocrine: Negative  Behavioral: Negative for tobacco use       Physical Exam:   BP (!) 80/50 (BP Location: Left arm, Patient Position: Chair, Cuff Size: Adult Large)  Pulse 103  Temp 97.5  F (36.4  C) (Oral)  Ht 6' 2\" (1.88 m)  Wt 192 lb 4.8 oz (87.2 kg)  SpO2 98%  BMI 24.69 kg/m2    GENERAL: chronically ill appearing elderly gentleman, alert and no acute distress  EYES: eyes grossly normal to inspection, and conjunctivae and sclerae normal  HENT: Normocephalic atraumatic. Nose and mouth without ulcers or lesions  NECK: supple  RESP: lungs clear to auscultation   CV: regular rate and rhythm, normal S1 S2  LYMPH: no peripheral edema   ABDOMEN: nondistended  MS: no gross musculoskeletal defects noted  SKIN: no suspicious lesions or rashes  NEURO: Alert & Oriented x 3.   PSYCH: mentation appears normal, affect normal        Diagnostic Test Results:     I have ordered CT Chest Abdomen Pelvis w/o Contrast, Creatinine for further evaluation and to screen for causes of elevated ESR of unclear etiology.    Results for orders placed or performed in visit on 11/10/17   Lyme Disease Jaleesa with reflex to WB Serum   Result Value Ref Range    Lyme Disease Antibodies Serum <0.01 0.00 - 0.89   Vitamin E   Result Value Ref Range    Vitamin E 9.4 5.5 - 18.0 mg/L    Vitamin E Gamma 0.5 0.0 - 6.0 mg/L   Protein electrophoresis   Result Value Ref Range    Albumin Fraction PENDING 3.7 - 5.1 g/dL    Alpha 1 Fraction PENDING 0.2 - 0.4 g/dL    Alpha 2 Fraction PENDING 0.5 - 0.9 g/dL    Beta Fraction PENDING 0.6 - 1.0 g/dL    Gamma Fraction PENDING 0.7 - 1.6 g/dL    Monoclonal Peak PENDING 0.0 g/dL    ELP Interpretation: PENDING    Erythrocyte sedimentation rate auto   Result Value Ref Range    Sed Rate 83 (H) 0 - 20 mm/h   Hemoglobin A1c   Result Value Ref Range    Hemoglobin A1C 5.9 4.3 - 6.0 %           ASSESSMENT/PLAN:       (R70.0) Elevated erythrocyte sedimentation rate  (primary encounter " diagnosis)  (D64.9) Anemia, unspecified type  Comment: Patient's recent labs at the Neurology referral appointment shows elevated ESR of unclear etiology. Also chronic anemia of many years duration with a family history of chronic anemia.  Plan: I have ordered CT Chest Abdomen Pelvis w/o Contrast, Creatinine for further evaluation and to screen for causes of elevated ESR of unclear etiology. I have also ONC/HEME ADULT REFERRAL for further evaluation going forward.      (I95.2) Hypotension due to drugs  Comment: Patient's BP is currently hypotensive causing dizziness. Patient has already previous discontinued his BP medications on his own.  Plan: I have discontinued the patient's Lisinopril, Losartan, Metoprolol BP medications due to hypotensive BP causing dizziness.      (E78.2) Mixed hyperlipidemia  Comment: chronic hyperlipidemia  Plan: the patient is maintained on Crestor medication.      Follow Up Plan:     Patient is instructed to return to Internal Medicine clinic for follow-up visit in 1 week.        Jacqueline Kerr MD  Internal Medicine  [facility]

## 2017-11-13 NOTE — NURSING NOTE
"Chief Complaint   Patient presents with     Recheck Medication       Initial BP (!) 80/50 (BP Location: Left arm, Patient Position: Chair, Cuff Size: Adult Large)  Pulse 103  Temp 97.5  F (36.4  C) (Oral)  Ht 6' 2\" (1.88 m)  Wt 192 lb 4.8 oz (87.2 kg)  SpO2 98%  BMI 24.69 kg/m2 Estimated body mass index is 24.69 kg/(m^2) as calculated from the following:    Height as of this encounter: 6' 2\" (1.88 m).    Weight as of this encounter: 192 lb 4.8 oz (87.2 kg).  Medication Reconciliation: complete   Ramya Vaca MA  "

## 2017-11-15 NOTE — LETTER
"    11/15/2017         RE: Guille Aguilar  [address]  [place] [address]        Dear Colleague,    Thank you for referring your patient, Guille Aguilar, to the [facility]. Please see a copy of my visit note below.    Oncology Rooming Note    November 15, 2017 3:22 PM   Guille Aguilar is a 77 year old male who presents for:    Chief Complaint   Patient presents with     Oncology Clinic Visit     New pt -anemia      Initial Vitals: /89 (BP Location: Left arm, Patient Position: Sitting, Cuff Size: Adult Regular)  Pulse 91  Temp 97.8  F (36.6  C) (Oral)  Resp 18  Wt 87.5 kg (193 lb)  SpO2 97%  BMI 24.78 kg/m2 Estimated body mass index is 24.78 kg/(m^2) as calculated from the following:    Height as of 11/13/17: 1.88 m (6' 2\").    Weight as of this encounter: 87.5 kg (193 lb). Body surface area is 2.14 meters squared.  Moderate Pain (5) Comment: all over!   No LMP for male patient.  Allergies reviewed: Yes  Medications reviewed: Yes    Medications: Medication refills not needed today.  Pharmacy name entered into Branch:    [facility] [place] - [address] [facility] - [facility] [address] [facility] - [place] - [address] AT [address]    Clinical concerns: broke out into hives and stopped taking meds(about four days ago) and still has the hives today. Still has nerve \"pains\", such as cutting off circulation and falling asleep and then the stinging begins.     Corby was notified.    10 minutes for nursing intake (face to face time)     Cecilia Lane MA            Medical Assistant Note:  Guille Aguilar presents today for labs.    Patient seen by provider today: Yes: Corby.   present during visit today: Not Applicable.    Concerns: No Concerns.    Procedure:  Lab draw site: LAC, Needle type: BF, Gauge: 21.    Post Assessment:  Labs " drawn without difficulty: Yes.    Discharge Plan:  Pt tolerated procedure well. Gauze and band aid applied.    Face to Face Time: 10min.    Cecilia Lane MA          DISCHARGE PLAN:  Next appointments: See patient instruction section  Departure Mode: Ambulatory, escorted to [facility] for scheduling with Ney.  Accompanied by: self  2 minutes for nursing discharge (face to face time)   Cecilia Lane MA      This consult has been requested by Jacqueline Kerr MD for anemia.     Mr. Guille Aguilar is a 77-year-old gentleman who has not been feeling well for the last few months. He has been more weak and tired.  Appetite has been decreased.  In the last 1 year, he has lost 30 pounds of weight.  Patient said that he was trying to lose weight, but this is more than he expected.  The patient also has been having some numbness in both feet.  The patient has been seen by Dr. Kerr and also by neurologist.  He has had multiple investigations done.  He was found to be anemic. There are few other abnormalities in the lab.  His labs are reviewed and summarized below.      1.  Multiple labs were done on 10/02/2017.   -WBC 4.9, hemoglobin 11.6 and platelets of 126.  MCV of 93 and RDW of 13.7.   -TSH of 1.56.   -Vitamin B12 of 1378.   -Folate of 15.5 on 10/02/2017.      2.  Multiple labs were done on 10/17/2017.   -WBC 4.6, hemoglobin of 11.7 and platelets of 141.  MCV of 92.   -Creatinine of 1.69.  Creatinine was 1.16 on 05/26/2017.   -Calcium of 9.3.   -Total protein of 10.2 and albumin of 2.8.   -Iron of 78 and ferritin of 237.   -Haptoglobin of 146.      3.  Multiple labs were done on 11/10/2017.   -ESR of 83.   -Serum protein electrophoresis reveals monoclonal peak of 4.0.   -Immunofixation reveals IgG lambda.   -IgG of 5110, IgA of 7 and IgM of 6.   -Urine protein electrophoresis reveals monoclonal peak of 21.    -Urine immunofixation reveals IgG lambda.      4.  CT chest, abdomen and pelvis was done on 11/14/2017.  It reveals  incidental left lung nodules, largest measuring 5 mm.  There is nodular enlarged right lobe of the thyroid which is stable since 2011.  Prostate is mildly enlarged.  No lymphadenopathy.  No splenomegaly.  No mass.      REVIEW OF SYSTEMS:    Patient's main problem is fatigue.  He has worsening fatigue.  Denies any headache.  No dizziness.  No ear pain or sore throat.  No neck pain.  No chest pain or difficulty breathing.  Appetite has been decreased.  He has lost 30 pounds of weight.  No urinary complaints.  No bowel problem.  No fever, chills or night sweats.  No bleeding from any site.  He has some aches and pain in the joints.  No severe bone pain.      Patient never had a colonoscopy done and does not want it.      All other review of systems negative.      ALLERGIES:  Reviewed.      MEDICATIONS:  Reviewed.      PAST MEDICAL HISTORY:   1.  Coronary artery disease, status post stent placement.   2.  Hyperlipidemia.   3.  Hypertension.   4.  Numbness in both feet.   5.  Glaucoma.      SOCIAL HISTORY:    -He is .    -Does not smoke.    -Occasional alcohol use.        FAMILY HISTORY:    -Father  at age of 83 after he tripped over a dog and fractured his hip.    -Mother  at age of 83 from CVA.    -He had 2 brothers.  One  of some kind of cancer.  One  of congenital heart disease.    -He has 1 sister who is in good health.      PHYSICAL EXAMINATION:   GENERAL:  He was alert and oriented x3.   VITALS:  Reviewed.   EYES:  No icterus.   THROAT:  No ulcer or thrush.   NECK:  Supple, no lymphadenopathy or thyromegaly.   AXILLAE:  No lymphadenopathy.   LUNGS:  Good air entry bilaterally.  No crackles or wheezing.   HEART:  Regular.  No murmur.   ABDOMEN:  Soft and nontender.  No mass felt.   EXTREMITIES:  No edema.  No calf swelling or tenderness.   SKIN:  No rash.      LABS:  Reviewed.      ASSESSMENT:   1.  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   2.  Acute renal failure.    3.  Normochromic normocytic anemia.     4 . Thrombocytopenia.   5.  Lung nodules.   6.  Enlarged prostate.   7.  Unintentional weight loss.      RECOMMENDATIONS:   1.  I had a long discussion with the patient.  Discussed regarding his symptoms.  He has few symptoms which are worrisome including fatigue, decreased appetite and unintentional weight loss.  This all points toward malignancy.     Labs were all reviewed.  Told him there are multiple abnormalities.  I am concerned regarding monoclonal protein.  M-spike is 4.0.  IgG elevated at 5110.  More than likely patient has multiple myeloma.      Discussed with him regarding multiple myeloma.  I explained to him that the diagnosis of multiple myeloma will explain everything including renal insufficiency, elevated protein, anemia, thrombocytopenia, fatigue and weight loss.      Discussed with him regarding further workup.  We will schedule him for bone marrow biopsy.  Will also get a skeletal survey.  Will also get labs including light chain assays. Once we establish the diagnosis of myeloma, we may end up doing other investigations including a PET scan.      2.  Discussed with him regarding renal insufficiency.  This will go along with multiple myeloma.  Will wait for diagnosis.  In future, he may have to see a nephrologist.       3.  Discussed with him regarding anemia and thrombocytopenia.  He already had multiple investigations done.  Will wait for bone marrow biopsy. Myeloma will explain his anemia and thrombocytopenia.       4.  Patient has small lung nodules.  Those will be monitored with CT chest in 1 year.     5.  Patient had multiple questions which were all answered.  I will see him back after the above investigations.      Thanks for the consult.         ELIZABETH NEVILLE MD             D: 11/15/2017 18:16   T: 11/15/2017 22:37   MT:       Name:     ARCHIE ALDANA   MRN:      [id number]        Account:      [id number]   :      1940            Visit Date:   11/15/2017      Document: [id number]        Let him know that PSA is elevated at 10.40. He should see a urologist.    Tony Tavarez           Again, thank you for allowing me to participate in the care of your patient.        Sincerely,        Tony Tavarez MD

## 2017-11-15 NOTE — MR AVS SNAPSHOT
After Visit Summary   11/15/2017    Guille Aguilar    MRN: [id number]           Patient Information     Date Of Birth          1940        Visit Information        Provider Department      11/15/2017 3:30 PM Tony Tavarez MD [facility]        Today's Diagnoses     MGUS (monoclonal gammopathy of unknown significance)    -  1    Prostate enlargement          Care Instructions    Labs today.  Bone survey.  Bone marrow biopsy under sedation.  Follow up after above.          Follow-ups after your visit        Your next 10 appointments already scheduled     Nov 16, 2017  9:30 AM CST   XR BONE SURVEY COMPLETE with SHXR5   [facility] ([facility])    [address] [id number] [phone]           Please bring a list of your current medicines to your exam. (Include vitamins, minerals and over-thecounter medicines.) Leave your valuables at home.  Tell your doctor if there is a chance you may be pregnant.  You do not need to do anything special for this exam.            Nov 20, 2017  2:30 PM CST   [facility]    [address] [id number] [phone]           Bring a current list of meds and any records pertaining to this visit. For Physicals, please bring immunization records and any forms needing to be filled out. Please arrive 10 minutes early to complete paperwork.            Dec 12, 2017  1:45 PM CST   (Arrive by 1:30 PM)   EMG with Juvencio Mak MD   [facility] ([facility])    [address]  [address] [facility] [id number] [phone]           Do not use lotions or creams on the area to be tested. If you are on blood thinners (Warfarin, Coumadin, Lovenox, etc), please contact your primary care physician to check if it is safe to stop them 3 days prior to testing. If you have  anxiety, please check with your referring physician to obtain anti-anxiety medication for the procedure.            Dec 15, 2017 12:30 PM CST   Return Visit with Aramis Olmos MD   [facility]    [address] [id number] [phone]              Future tests that were ordered for you today     Open Future Orders        Priority Expected Expires Ordered    XR Bone Survey Complete Routine 11/15/2017 11/16/2018 11/15/2017            Who to contact     If you have questions or need follow up information about today's clinic visit or your schedule please contact [facility] directly at [phone].  Normal or non-critical lab and imaging results will be communicated to you by MyChart, letter or phone within 4 business days after the clinic has received the results. If you do not hear from us within 7 days, please contact the clinic through MyChart or phone. If you have a critical or abnormal lab result, we will notify you by phone as soon as possible.  Submit refill requests through [employer] or call your pharmacy and they will forward the refill request to us. Please allow 3 business days for your refill to be completed.          Additional Information About Your Visit        Care EveryWhere ID     This is your Care EveryWhere ID. This could be used by other organizations to access your [place] medical records  [id number]        Your Vitals Were     Pulse Temperature Respirations Pulse Oximetry BMI (Body Mass Index)       91 97.8  F (36.6  C) (Oral) 18 97% 24.78 kg/m2        Blood Pressure from Last 3 Encounters:   11/15/17 136/89   11/13/17 (!) 80/50   11/10/17 120/82    Weight from Last 3 Encounters:   11/15/17 87.5 kg (193 lb)   11/13/17 87.2 kg (192 lb 4.8 oz)   11/10/17 89.4 kg (197 lb)              We Performed the Following     Bone marrow biopsy     Kappa and lambda light chain     PSA tumor marker         Primary Care Provider Office Phone # Fax #    Jacqueline Franco Kerr -[phone] [address] [address]        Equal Access to Services     YESSICA BERMEO : Hadii inna ku hadshio Soalonsoali, waaxda luqadaha, qaybta kaalmada adeegyada, geneva kuo laAntelmovíctor abdul. So [facility] [phone].    ATENCIÓN: Si habla español, tiene a puri disposición servicios gratuitos de asistencia lingüística. Llame al [phone].    We comply with applicable federal civil rights laws and [place] laws. We do not discriminate on the basis of race, color, national origin, age, disability, sex, sexual orientation, or gender identity.            Thank you!     Thank you for choosing [facility]  for your care. Our goal is always to provide you with excellent care. Hearing back from our patients is one way we can continue to improve our services. Please take a few minutes to complete the written survey that you may receive in the mail after your visit with us. Thank you!             Your Updated Medication List - Protect others around you: Learn how to safely use, store and throw away your medicines at www.disposemymeds.org.          This list is accurate as of: 11/15/17  4:37 PM.  Always use your most recent med list.                   Brand Name Dispense Instructions for use Diagnosis    aspirin 81 MG tablet      1 TABLET DAILY        IRON SUPPLEMENT PO      Take 1 tablet by mouth daily (with breakfast)        NITRO-DUR 0.4 MG/HR 24 hr patch   Generic drug:  nitroGLYcerin      Place 1 patch onto the skin as needed.        rosuvastatin 40 MG tablet    CRESTOR    90 tablet    Take 1 tablet (40 mg) by mouth daily    Coronary artery disease involving native coronary artery of native heart without angina pectoris

## 2017-11-15 NOTE — PROGRESS NOTES
"Oncology Rooming Note    November 15, 2017 3:22 PM   Guille Aguilar is a 77 year old male who presents for:    Chief Complaint   Patient presents with     Oncology Clinic Visit     New pt -anemia      Initial Vitals: /89 (BP Location: Left arm, Patient Position: Sitting, Cuff Size: Adult Regular)  Pulse 91  Temp 97.8  F (36.6  C) (Oral)  Resp 18  Wt 87.5 kg (193 lb)  SpO2 97%  BMI 24.78 kg/m2 Estimated body mass index is 24.78 kg/(m^2) as calculated from the following:    Height as of 11/13/17: 1.88 m (6' 2\").    Weight as of this encounter: 87.5 kg (193 lb). Body surface area is 2.14 meters squared.  Moderate Pain (5) Comment: all over!   No LMP for male patient.  Allergies reviewed: Yes  Medications reviewed: Yes    Medications: Medication refills not needed today.  Pharmacy name entered into [employer]:    [facility] [place] [address] [facility] - [facility] [address] [facility] - [place] - [address] AT [address]    Clinical concerns: broke out into hives and stopped taking meds(about four days ago) and still has the hives today. Still has nerve \"pains\", such as cutting off circulation and falling asleep and then the stinging begins.     Corby was notified.    10 minutes for nursing intake (face to face time)     Cecilia Lane MA            Medical Assistant Note:  Guille Aguilar presents today for labs.    Patient seen by provider today: Yes: Corby.   present during visit today: Not Applicable.    Concerns: No Concerns.    Procedure:  Lab draw site: LAC, Needle type: BF, Gauge: 21.    Post Assessment:  Labs drawn without difficulty: Yes.    Discharge Plan:  Pt tolerated procedure well. Gauze and band aid applied.    Face to Face Time: 10min.    Cecilia Lane MA          DISCHARGE PLAN:  Next appointments: See patient instruction section  Departure " Mode: Ambulatory, escorted to lobby for scheduling with Jacoby  Accompanied by: self  2 minutes for nursing discharge (face to face time)   Cecilia Lane MA

## 2017-11-15 NOTE — PATIENT INSTRUCTIONS
Labs today.  Bone survey.  Bone marrow biopsy under sedation.  Follow up after above.    11/16- Sent staff message to see if this is being scheduled AG      You are scheduled for a Bone Marrow Biopsy at [facility] on Nov 20,2017 @ 9:00am  Please check in at the [facility] Lobby at 8:00am  Nothing to eat or drink after midnight  You will need a

## 2017-11-16 NOTE — PROGRESS NOTES
This consult has been requested by Jacqueline Kerr MD for anemia.     Mr. Guille Aguilar is a 77-year-old gentleman who has not been feeling well for the last few months. He has been more weak and tired.  Appetite has been decreased.  In the last 1 year, he has lost 30 pounds of weight.  Patient said that he was trying to lose weight, but this is more than he expected.  The patient also has been having some numbness in both feet.  The patient has been seen by Dr. Kerr and also by neurologist.  He has had multiple investigations done.  He was found to be anemic. There are few other abnormalities in the lab.  His labs are reviewed and summarized below.      1.  Multiple labs were done on 10/02/2017.   -WBC 4.9, hemoglobin 11.6 and platelets of 126.  MCV of 93 and RDW of 13.7.   -TSH of 1.56.   -Vitamin B12 of 1378.   -Folate of 15.5 on 10/02/2017.      2.  Multiple labs were done on 10/17/2017.   -WBC 4.6, hemoglobin of 11.7 and platelets of 141.  MCV of 92.   -Creatinine of 1.69.  Creatinine was 1.16 on 05/26/2017.   -Calcium of 9.3.   -Total protein of 10.2 and albumin of 2.8.   -Iron of 78 and ferritin of 237.   -Haptoglobin of 146.      3.  Multiple labs were done on 11/10/2017.   -ESR of 83.   -Serum protein electrophoresis reveals monoclonal peak of 4.0.   -Immunofixation reveals IgG lambda.   -IgG of 5110, IgA of 7 and IgM of 6.   -Urine protein electrophoresis reveals monoclonal peak of 21.    -Urine immunofixation reveals IgG lambda.      4.  CT chest, abdomen and pelvis was done on 11/14/2017.  It reveals incidental left lung nodules, largest measuring 5 mm.  There is nodular enlarged right lobe of the thyroid which is stable since 2011.  Prostate is mildly enlarged.  No lymphadenopathy.  No splenomegaly.  No mass.      REVIEW OF SYSTEMS:    Patient's main problem is fatigue.  He has worsening fatigue.  Denies any headache.  No dizziness.  No ear pain or sore throat.  No neck pain.  No chest pain or  difficulty breathing.  Appetite has been decreased.  He has lost 30 pounds of weight.  No urinary complaints.  No bowel problem.  No fever, chills or night sweats.  No bleeding from any site.  He has some aches and pain in the joints.  No severe bone pain.      Patient never had a colonoscopy done and does not want it.      All other review of systems negative.      ALLERGIES:  Reviewed.      MEDICATIONS:  Reviewed.      PAST MEDICAL HISTORY:   1.  Coronary artery disease, status post stent placement.   2.  Hyperlipidemia.   3.  Hypertension.   4.  Numbness in both feet.   5.  Glaucoma.      SOCIAL HISTORY:    -He is .    -Does not smoke.    -Occasional alcohol use.        FAMILY HISTORY:    -Father  at age of 83 after he tripped over a dog and fractured his hip.    -Mother  at age of 83 from CVA.    -He had 2 brothers.  One  of some kind of cancer.  One  of congenital heart disease.    -He has 1 sister who is in good health.      PHYSICAL EXAMINATION:   GENERAL:  He was alert and oriented x3.   VITALS:  Reviewed.   EYES:  No icterus.   THROAT:  No ulcer or thrush.   NECK:  Supple, no lymphadenopathy or thyromegaly.   AXILLAE:  No lymphadenopathy.   LUNGS:  Good air entry bilaterally.  No crackles or wheezing.   HEART:  Regular.  No murmur.   ABDOMEN:  Soft and nontender.  No mass felt.   EXTREMITIES:  No edema.  No calf swelling or tenderness.   SKIN:  No rash.      LABS:  Reviewed.      ASSESSMENT:   1.  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   2.  Acute renal failure.   3.  Normochromic normocytic anemia.     4 . Thrombocytopenia.   5.  Lung nodules.   6.  Enlarged prostate.   7.  Unintentional weight loss.      RECOMMENDATIONS:   1.  I had a long discussion with the patient.  Discussed regarding his symptoms.  He has few symptoms which are worrisome including fatigue, decreased appetite and unintentional weight loss.  This all points toward malignancy.     Labs  were all reviewed.  Told him there are multiple abnormalities.  I am concerned regarding monoclonal protein.  M-spike is 4.0.  IgG elevated at 5110.  More than likely patient has multiple myeloma.      Discussed with him regarding multiple myeloma.  I explained to him that the diagnosis of multiple myeloma will explain everything including renal insufficiency, elevated protein, anemia, thrombocytopenia, fatigue and weight loss.      Discussed with him regarding further workup.  We will schedule him for bone marrow biopsy.  Will also get a skeletal survey.  Will also get labs including light chain assays. Once we establish the diagnosis of myeloma, we may end up doing other investigations including a PET scan.      2.  Discussed with him regarding renal insufficiency.  This will go along with multiple myeloma.  Will wait for diagnosis.  In future, he may have to see a nephrologist.       3.  Discussed with him regarding anemia and thrombocytopenia.  He already had multiple investigations done.  Will wait for bone marrow biopsy. Myeloma will explain his anemia and thrombocytopenia.       4.  Patient has small lung nodules.  Those will be monitored with CT chest in 1 year.     5.  Patient had multiple questions which were all answered.  I will see him back after the above investigations.      Thanks for the consult.         ELIZABETH NEVILLE MD             D: 11/15/2017 18:16   T: 11/15/2017 22:37   MT:       Name:     ARCHIE ALDANA   MRN:      [id number]        Account:      [id number]   :      1940           Visit Date:   11/15/2017      Document: [id number]

## 2017-11-16 NOTE — TELEPHONE ENCOUNTER
Left voicemail message for patient to return call regarding scheduling.  Patient needs to be notified of scheduled Bone Marrow Biopsy at [facility] on Nov 20,2017

## 2017-11-16 NOTE — PROGRESS NOTES
Please advise Guille Aguilar,  1940, that his vitamin B6 level is a little low. This can be supplemented dietarily:   Meats, whole grains, vegetables, and nuts are sources. It appears that the other lab abnormalities have already been addressed.   [phone] (home)   Ene Brunson

## 2017-11-20 NOTE — TELEPHONE ENCOUNTER
I received a call from Pathology that patient cancelled bone marrow biopsy and bone survey.    I called the patient to follow up with the cancellation to see if he needed assistance with rescheduling and why he cancelled. Patient states he wants to see his own doctor before anything else and he will call back if he chooses to go forward.    Shazia Pate

## 2017-11-20 NOTE — PROGRESS NOTES
Chief Complaint:     Follow up on multiple concerns including MGUS concerning for possible multiple myeloma diagnosis    HPI:   Patient Guille Aguilar is a very pleasant 77 year old male with history of recent diagnosis of MGUS concerning for possible multiple myeloma diagnosis who presents to Internal Medicine clinic today for follow up of multiple concerns. Regarding the patient's recent lab results consistent with MGUS concerning for possible Multiple Myeloma diagnosis. Patient has already been referred to and evaluated by outpatient Hematology/Oncology specialsit clinic last week and has been referred by Heme/Onc for a bone marrow biopsy. Patient's recent labs also show elevated PSA. Patient is interested in an evaluation by outpatient Urology specialist clinic going forward for further evaluation of his elevated PSA diagnosis.      Current Medications:     Current Outpatient Prescriptions   Medication Sig Dispense Refill     nitroGLYCERIN (NITRO-DUR) 0.4 MG/HR Place 1 patch onto the skin as needed.       ASPIRIN 81 MG OR TABS 1 TABLET DAILY           Allergies:      Allergies   Allergen Reactions     Dust Mites      No Known Allergies             Past Medical History:     Past Medical History:   Diagnosis Date     CAD (coronary artery disease)     CABG 2011: LIMA to LAD, SVG to OM, SVG Y-graft to posterolateral and PDA, cardiac cath 2011: BMS to OM, cath 2006: medicalmanagment     Dental abscess      Essential hypertension, benign      Glaucoma 12/4/2009     High cholesterol 12/4/2009     Hyperlipidaemia      Left ventricular diastolic dysfunction      Non Q wave myocardial infarction (H)     2006, 2011     Obesity, unspecified          Past Surgical History:     Past Surgical History:   Procedure Laterality Date     CATARACT IOL, RT/LT      left     CORONARY ARTERY BYPASS  2011    CABG 2011: LIMA to LAD, SVG to OM, SVG Y-graft to posterolateral and PDA     HEART CATH, ANGIOPLASTY      2006 OM1, unsuccessful  "PCI-medical management         Family Medical History:     Family History   Problem Relation Age of Onset     Other - See Comments Mother 83     old age     Other - See Comments Father      fall         Social History:     Social History     Social History     Marital status:      Spouse name: N/A     Number of children: N/A     Years of education: N/A     Occupational History     Not on file.     Social History Main Topics     Smoking status: Never Smoker     Smokeless tobacco: Never Used     Alcohol use No     Drug use: No     Sexual activity: Not Currently     Partners: Female     Other Topics Concern     Caffeine Concern Yes     2 cups daily of coffee     Sleep Concern No     Stress Concern No     Weight Concern No     Special Diet No     Exercise Yes     walking in the mall     Parent/Sibling W/ Cabg, Mi Or Angioplasty Before 65f 55m? Yes     Social History Narrative           Review of System:     Constitutional: Negative for fever or chills. Positive for chronic fatigue and weight loss.  Skin: Negative for rashes  Ears/Nose/Throat: Negative for nasal congestion, sore throat  Respiratory: No shortness of breath, dyspnea on exertion, cough, or hemoptysis  Cardiovascular: Negative for chest pain  Gastrointestinal: Negative for nausea, vomiting  Genitourinary: Negative for dysuria, hematuria  Musculoskeletal: Negative for myalgias  Neurologic: Negative for headaches  Psychiatric: Negative for depression, anxiety  Hematologic/Lymphatic/Immunologic: Positive for MGUS concerning for multiple myeloma.  Endocrine: Negative  Behavioral: Negative for tobacco use       Physical Exam:   /80 (BP Location: Right arm, Patient Position: Sitting, Cuff Size: Adult Large)  Pulse 89  Temp 98.5  F (36.9  C) (Tympanic)  Ht 6' 2\" (1.88 m)  Wt 193 lb (87.5 kg)  SpO2 96%  BMI 24.78 kg/m2    GENERAL: chronically ill appearing elderly gentleman, alert and no distress  EYES: eyes grossly normal to inspection, and " conjunctivae and sclerae normal  HENT: Normocephalic atraumatic. Nose and mouth without ulcers or lesions  NECK: supple  RESP: lungs clear to auscultation   CV: regular rate and rhythm, normal S1 S2  LYMPH: no peripheral edema   ABDOMEN: nondistended  MS: no gross musculoskeletal defects noted  SKIN: no suspicious lesions or rashes  NEURO: Alert & Oriented x 3.   PSYCH: mentation appears normal, mildly depressed affect        Diagnostic Test Results:     Diagnostic Test Results:  Results for orders placed or performed in visit on 11/15/17   Ensign and lambda light chain   Result Value Ref Range    Kappa Free Lt Chain 0.40 0.33 - 1.94 mg/dL    Lambda Free Lt Chain 9.25 (H) 0.57 - 2.63 mg/dL    Kappa Lambda Ratio 0.04 (L) 0.26 - 1.65   PSA tumor marker   Result Value Ref Range    PSA 10.40 (H) 0 - 4 ug/L       ASSESSMENT/PLAN:       (C90.00) Multiple myeloma, remission status unspecified (H)  (primary encounter diagnosis)  (D47.2) MGUS (monoclonal gammopathy of unknown significance)  Comment: Patient's recent lab tests and CT scan results are consistent with MGUS concerning for possible Multiple Myeloma diagnosis. Patient has already been referred to and evaluated by outpatient Hematology/Oncology specialsit clinic last week and has been referred by Heme/Onc for a bone marrow biopsy.  Plan: Patient is advised to continue his Heme/Onc follow up going forward and to schedule his bone marrow biopsy ASAP.      (R97.20) Elevated prostate specific antigen (PSA)  Comment: Patient's recent lab results also confirms diagnosis of elevated PSA.  Plan: I have ordered UROLOGY ADULT REFERRAL for furhter evaluation and management going forward where the patient's consultation appointment has been scheduled for 12/4/2017.      (I10) Essential hypertension, benign  Comment: Patient's BP is currently at goal after his BP medications have been discontinued due to his recent weight loss.  Plan: There is no need to restart patient's  previous BP medication regimen at this time.       Follow Up Plan:     Patient is instructed to return to Internal Medicine clinic for follow-up visit in 1 week.        Jacqueline Kerr MD  Internal Medicine  [facility]

## 2017-11-20 NOTE — TELEPHONE ENCOUNTER
Reason for Call:  Other     Detailed comments:  DR. GREGG needs to have peer to peer with Dr Kerr to get procedures approved    Phone Number Patient can be reached at: Other phone number:  [phone] Dr Gregg Cell ph    Best Time: anytime - by Wednesday am    Can we leave a detailed message on this number? YES    Call taken on 11/20/2017 at 12:09 PM by Pierre Wilson

## 2017-11-20 NOTE — NURSING NOTE
"Chief Complaint   Patient presents with     RECHECK     1 week f/u       Initial /80 (BP Location: Right arm, Patient Position: Sitting, Cuff Size: Adult Large)  Pulse 89  Temp 98.5  F (36.9  C) (Tympanic)  Ht 6' 2\" (1.88 m)  Wt 193 lb (87.5 kg)  SpO2 96%  BMI 24.78 kg/m2 Estimated body mass index is 24.78 kg/(m^2) as calculated from the following:    Height as of this encounter: 6' 2\" (1.88 m).    Weight as of this encounter: 193 lb (87.5 kg).  Medication Reconciliation: complete   Toma Daley- CMA      "

## 2017-11-20 NOTE — MR AVS SNAPSHOT
After Visit Summary   11/20/2017    Guille Aguilar    MRN: [id number]           Patient Information     Date Of Birth          1940        Visit Information        Provider Department      11/20/2017 2:30 PM Jacqueline Kerr MD [facility]        Today's Diagnoses     Multiple myeloma, remission status unspecified (H)    -  1    Elevated prostate specific antigen (PSA)        Essential hypertension, benign        MGUS (monoclonal gammopathy of unknown significance)           Follow-ups after your visit        Additional Services     UROLOGY ADULT REFERRAL       Your provider has referred you to: [facility] [phone]   [email or web address]    Please be aware that coverage of these services is subject to the terms and limitations of your health insurance plan.  Call member services at your health plan with any benefit or coverage questions.      Please bring the following with you to your appointment:    (1) Any X-Rays, CTs or MRIs which have been performed.  Contact the facility where they were done to arrange for  prior to your scheduled appointment.    (2) List of current medications  (3) This referral request   (4) Any documents/labs given to you for this referral                  Follow-up notes from your care team     Return in about 4 weeks (around 12/18/2017).      Your next 10 appointments already scheduled     Dec 04, 2017  3:00 PM CST   New Patient Visit with Jarad Gordillo MD   [facility] ([facility])    [address]  [address]  [place] [address] [phone]            Dec 12, 2017  1:45 PM CST   (Arrive by 1:30 PM)   EMG with Juvencio Mak MD   [facility] ([facility])    [address]  3rd [address]  [place] [address] [phone]           Do not use lotions or creams on the area to be  "tested. If you are on blood thinners (Warfarin, Coumadin, Lovenox, etc), please contact your primary care physician to check if it is safe to stop them 3 days prior to testing. If you have anxiety, please check with your referring physician to obtain anti-anxiety medication for the procedure.            Dec 15, 2017 12:30 PM CST   [facility] Visit with Aramis Olmos MD   [facility]    [address] [address] [phone]              Who to contact     If you have questions or need follow up information about today's clinic visit or your schedule please contact [employer] directly at [phone].  Normal or non-critical lab and imaging results will be communicated to you by MyChart, letter or phone within 4 business days after the clinic has received the results. If you do not hear from us within 7 days, please contact the clinic through MyChart or phone. If you have a critical or abnormal lab result, we will notify you by phone as soon as possible.  Submit refill requests through [employer] or call your pharmacy and they will forward the refill request to us. Please allow 3 business days for your refill to be completed.          Additional Information About Your Visit        Care EveryWhere ID     This is your Care EveryWhere ID. This could be used by other organizations to access your [facility] medical records  [id number]        Your Vitals Were     Pulse Temperature Height Pulse Oximetry BMI (Body Mass Index)       89 98.5  F (36.9  C) (Tympanic) 6' 2\" (1.88 m) 96% 24.78 kg/m2        Blood Pressure from Last 3 Encounters:   11/20/17 118/80   11/15/17 136/89   11/13/17 (!) 80/50    Weight from Last 3 Encounters:   11/20/17 193 lb (87.5 kg)   11/15/17 193 lb (87.5 kg)   11/13/17 192 lb 4.8 oz (87.2 kg)              We Performed the Following     UROLOGY ADULT REFERRAL        Primary Care Provider Office Phone # Fax # "    Jacqueline Kerr [phone]       [address]  [place] [address]        Equal Access to Services     YESSICA BERMEO : Hadii aad ku hadshiagata Isadoramicah, wakelleeda luqsandroha, katrinta kaalmada khadar, geneva bolañospritesh dohertykaushal kuo laAntelmovíctor abdul. So [facility] [phone].    ATENCIÓN: Si habla español, tiene a puri disposición servicios gratuitos de asistencia lingüística. Llame al [phone].    We comply with applicable federal civil rights laws and [place] laws. We do not discriminate on the basis of race, color, national origin, age, disability, sex, sexual orientation, or gender identity.            Thank you!     Thank you for choosing [facility]  for your care. Our goal is always to provide you with excellent care. Hearing back from our patients is one way we can continue to improve our services. Please take a few minutes to complete the written survey that you may receive in the mail after your visit with us. Thank you!             Your Updated Medication List - Protect others around you: Learn how to safely use, store and throw away your medicines at www.disposemymeds.org.          This list is accurate as of: 11/20/17 11:59 PM.  Always use your most recent med list.                   Brand Name Dispense Instructions for use Diagnosis    aspirin 81 MG tablet      1 TABLET DAILY        NITRO-DUR 0.4 MG/HR 24 hr patch   Generic drug:  nitroGLYcerin      Place 1 patch onto the skin as needed.

## 2017-12-13 NOTE — TELEPHONE ENCOUNTER
I called to follow up with patient to see if he had made some decisions regarding going forwarded with the recommended plan that Dr. Tavarez advised.    Patient states he is doing research and if he wants to  Go forward with the plan he will call his primary office and hung up. Shazia Pate

## 2018-01-01 ENCOUNTER — TELEPHONE (OUTPATIENT)
Dept: ONCOLOGY | Facility: CLINIC | Age: 78
End: 2018-01-01

## 2018-01-01 ENCOUNTER — MEDICAL CORRESPONDENCE (OUTPATIENT)
Dept: HEALTH INFORMATION MANAGEMENT | Facility: CLINIC | Age: 78
End: 2018-01-01

## 2018-01-01 ENCOUNTER — TELEPHONE (OUTPATIENT)
Dept: CARDIOLOGY | Facility: CLINIC | Age: 78
End: 2018-01-01

## 2018-01-01 ENCOUNTER — APPOINTMENT (OUTPATIENT)
Dept: MRI IMAGING | Facility: CLINIC | Age: 78
DRG: 683 | End: 2018-01-01
Attending: HOSPITALIST
Payer: COMMERCIAL

## 2018-01-01 ENCOUNTER — ONCOLOGY VISIT (OUTPATIENT)
Dept: ONCOLOGY | Facility: CLINIC | Age: 78
End: 2018-01-01
Attending: INTERNAL MEDICINE
Payer: COMMERCIAL

## 2018-01-01 ENCOUNTER — DOCUMENTATION ONLY (OUTPATIENT)
Dept: CARDIOLOGY | Facility: CLINIC | Age: 78
End: 2018-01-01

## 2018-01-01 ENCOUNTER — TELEPHONE (OUTPATIENT)
Dept: FAMILY MEDICINE | Facility: CLINIC | Age: 78
End: 2018-01-01

## 2018-01-01 ENCOUNTER — HOSPITAL ENCOUNTER (OUTPATIENT)
Facility: CLINIC | Age: 78
End: 2018-01-01
Attending: PATHOLOGY | Admitting: PATHOLOGY
Payer: COMMERCIAL

## 2018-01-01 ENCOUNTER — HOSPITAL ENCOUNTER (INPATIENT)
Facility: CLINIC | Age: 78
LOS: 8 days | Discharge: NURSING, REHAB OR LONG-TERM CARE | DRG: 683 | End: 2018-04-17
Attending: EMERGENCY MEDICINE | Admitting: HOSPITALIST
Payer: COMMERCIAL

## 2018-01-01 ENCOUNTER — TELEPHONE (OUTPATIENT)
Dept: NURSING | Facility: CLINIC | Age: 78
End: 2018-01-01

## 2018-01-01 ENCOUNTER — APPOINTMENT (OUTPATIENT)
Dept: CT IMAGING | Facility: CLINIC | Age: 78
DRG: 683 | End: 2018-01-01
Attending: EMERGENCY MEDICINE
Payer: COMMERCIAL

## 2018-01-01 ENCOUNTER — HOSPITAL ENCOUNTER (OUTPATIENT)
Dept: CARDIOLOGY | Facility: CLINIC | Age: 78
Discharge: HOME | End: 2018-03-02
Attending: PHYSICIAN ASSISTANT | Admitting: PHYSICIAN ASSISTANT
Payer: COMMERCIAL

## 2018-01-01 ENCOUNTER — APPOINTMENT (OUTPATIENT)
Dept: PHYSICAL THERAPY | Facility: CLINIC | Age: 78
DRG: 683 | End: 2018-01-01
Attending: INTERNAL MEDICINE
Payer: COMMERCIAL

## 2018-01-01 ENCOUNTER — APPOINTMENT (OUTPATIENT)
Dept: GENERAL RADIOLOGY | Facility: CLINIC | Age: 78
DRG: 683 | End: 2018-01-01
Attending: NURSE PRACTITIONER
Payer: COMMERCIAL

## 2018-01-01 ENCOUNTER — APPOINTMENT (OUTPATIENT)
Dept: OCCUPATIONAL THERAPY | Facility: CLINIC | Age: 78
DRG: 683 | End: 2018-01-01
Attending: HOSPITALIST
Payer: COMMERCIAL

## 2018-01-01 ENCOUNTER — APPOINTMENT (OUTPATIENT)
Dept: OCCUPATIONAL THERAPY | Facility: CLINIC | Age: 78
DRG: 683 | End: 2018-01-01
Payer: COMMERCIAL

## 2018-01-01 ENCOUNTER — APPOINTMENT (OUTPATIENT)
Dept: GENERAL RADIOLOGY | Facility: CLINIC | Age: 78
DRG: 683 | End: 2018-01-01
Attending: HOSPITALIST
Payer: COMMERCIAL

## 2018-01-01 ENCOUNTER — OFFICE VISIT (OUTPATIENT)
Dept: CARDIOLOGY | Facility: CLINIC | Age: 78
End: 2018-01-01
Attending: INTERNAL MEDICINE
Payer: COMMERCIAL

## 2018-01-01 VITALS
RESPIRATION RATE: 16 BRPM | SYSTOLIC BLOOD PRESSURE: 76 MMHG | TEMPERATURE: 97.5 F | OXYGEN SATURATION: 95 % | DIASTOLIC BLOOD PRESSURE: 50 MMHG | WEIGHT: 172 LBS | HEART RATE: 86 BPM | BODY MASS INDEX: 22.08 KG/M2

## 2018-01-01 VITALS
SYSTOLIC BLOOD PRESSURE: 95 MMHG | WEIGHT: 176.5 LBS | HEIGHT: 74 IN | HEART RATE: 84 BPM | BODY MASS INDEX: 22.65 KG/M2 | DIASTOLIC BLOOD PRESSURE: 64 MMHG

## 2018-01-01 VITALS
WEIGHT: 168.4 LBS | HEIGHT: 74 IN | SYSTOLIC BLOOD PRESSURE: 131 MMHG | HEART RATE: 80 BPM | BODY MASS INDEX: 21.61 KG/M2 | RESPIRATION RATE: 16 BRPM | DIASTOLIC BLOOD PRESSURE: 84 MMHG | OXYGEN SATURATION: 97 % | TEMPERATURE: 97.4 F

## 2018-01-01 DIAGNOSIS — E78.2 MIXED HYPERLIPIDEMIA: ICD-10-CM

## 2018-01-01 DIAGNOSIS — E78.6 HDL DEFICIENCY: Chronic | ICD-10-CM

## 2018-01-01 DIAGNOSIS — D47.2 MGUS (MONOCLONAL GAMMOPATHY OF UNKNOWN SIGNIFICANCE): Primary | ICD-10-CM

## 2018-01-01 DIAGNOSIS — N17.9 ACUTE RENAL FAILURE, UNSPECIFIED ACUTE RENAL FAILURE TYPE (H): ICD-10-CM

## 2018-01-01 DIAGNOSIS — K21.9 GASTROESOPHAGEAL REFLUX DISEASE WITHOUT ESOPHAGITIS: ICD-10-CM

## 2018-01-01 DIAGNOSIS — I51.9 LEFT VENTRICULAR DIASTOLIC DYSFUNCTION: ICD-10-CM

## 2018-01-01 DIAGNOSIS — R06.02 SOB (SHORTNESS OF BREATH): ICD-10-CM

## 2018-01-01 DIAGNOSIS — D64.9 ANEMIA, UNSPECIFIED TYPE: ICD-10-CM

## 2018-01-01 DIAGNOSIS — E78.00 PURE HYPERCHOLESTEROLEMIA: Primary | ICD-10-CM

## 2018-01-01 DIAGNOSIS — I25.10 CORONARY ARTERY DISEASE INVOLVING NATIVE CORONARY ARTERY OF NATIVE HEART WITHOUT ANGINA PECTORIS: ICD-10-CM

## 2018-01-01 DIAGNOSIS — R19.7 DIARRHEA, UNSPECIFIED TYPE: ICD-10-CM

## 2018-01-01 DIAGNOSIS — E87.5 HYPERKALEMIA: ICD-10-CM

## 2018-01-01 DIAGNOSIS — G60.9 IDIOPATHIC PERIPHERAL NEUROPATHY: ICD-10-CM

## 2018-01-01 DIAGNOSIS — C90.00 MULTIPLE MYELOMA NOT HAVING ACHIEVED REMISSION (H): ICD-10-CM

## 2018-01-01 DIAGNOSIS — H10.33 ACUTE BACTERIAL CONJUNCTIVITIS OF BOTH EYES: ICD-10-CM

## 2018-01-01 DIAGNOSIS — I25.10 CORONARY ARTERY DISEASE INVOLVING NATIVE CORONARY ARTERY OF NATIVE HEART WITHOUT ANGINA PECTORIS: Primary | ICD-10-CM

## 2018-01-01 DIAGNOSIS — Z53.9 DIAGNOSIS NOT YET DEFINED: Primary | ICD-10-CM

## 2018-01-01 DIAGNOSIS — N40.0 BENIGN PROSTATIC HYPERPLASIA, UNSPECIFIED WHETHER LOWER URINARY TRACT SYMPTOMS PRESENT: Primary | ICD-10-CM

## 2018-01-01 LAB
ABO + RH BLD: NORMAL
ABO + RH BLD: NORMAL
ALBUMIN SERPL ELPH-MCNC: 2.3 G/DL (ref 3.7–5.1)
ALBUMIN SERPL-MCNC: 1.6 G/DL (ref 3.4–5)
ALBUMIN SERPL-MCNC: 2.2 G/DL (ref 3.4–5)
ALBUMIN UR-MCNC: 100 MG/DL
ALP SERPL-CCNC: 59 U/L (ref 40–150)
ALP SERPL-CCNC: 71 U/L (ref 40–150)
ALPHA1 GLOB SERPL ELPH-MCNC: 0.4 G/DL (ref 0.2–0.4)
ALPHA2 GLOB SERPL ELPH-MCNC: 0.7 G/DL (ref 0.5–0.9)
ALT SERPL W P-5'-P-CCNC: 22 U/L (ref 0–70)
ALT SERPL W P-5'-P-CCNC: 43 U/L (ref 0–70)
ALT SERPL W P-5'-P-CCNC: 8 U/L (ref 5–30)
ANION GAP SERPL CALCULATED.3IONS-SCNC: 10.4 MMOL/L (ref 6–17)
ANION GAP SERPL CALCULATED.3IONS-SCNC: 15 MMOL/L (ref 3–14)
ANION GAP SERPL CALCULATED.3IONS-SCNC: 15 MMOL/L (ref 3–14)
ANION GAP SERPL CALCULATED.3IONS-SCNC: 19 MMOL/L (ref 3–14)
ANION GAP SERPL CALCULATED.3IONS-SCNC: 22 MMOL/L (ref 3–14)
ANION GAP SERPL CALCULATED.3IONS-SCNC: 6 MMOL/L (ref 3–14)
ANION GAP SERPL CALCULATED.3IONS-SCNC: 7 MMOL/L (ref 3–14)
ANION GAP SERPL CALCULATED.3IONS-SCNC: 8 MMOL/L (ref 3–14)
APPEARANCE UR: ABNORMAL
AST SERPL W P-5'-P-CCNC: 15 U/L (ref 0–45)
AST SERPL W P-5'-P-CCNC: 39 U/L (ref 0–45)
B-GLOBULIN SERPL ELPH-MCNC: 0.4 G/DL (ref 0.6–1)
BASOPHILS # BLD AUTO: 0 10E9/L (ref 0–0.2)
BASOPHILS NFR BLD AUTO: 0 %
BASOPHILS NFR BLD AUTO: 0.3 %
BASOPHILS NFR BLD AUTO: 0.3 %
BILIRUB DIRECT SERPL-MCNC: 0.1 MG/DL (ref 0–0.2)
BILIRUB SERPL-MCNC: 0.2 MG/DL (ref 0.2–1.3)
BILIRUB SERPL-MCNC: 0.3 MG/DL (ref 0.2–1.3)
BILIRUB UR QL STRIP: NEGATIVE
BLD GP AB SCN SERPL QL: NORMAL
BLOOD BANK CMNT PATIENT-IMP: NORMAL
BUN SERPL-MCNC: 122 MG/DL (ref 7–30)
BUN SERPL-MCNC: 163 MG/DL (ref 7–30)
BUN SERPL-MCNC: 182 MG/DL (ref 7–30)
BUN SERPL-MCNC: 187 MG/DL (ref 7–30)
BUN SERPL-MCNC: 21 MG/DL (ref 7–30)
BUN SERPL-MCNC: 22 MG/DL (ref 7–30)
BUN SERPL-MCNC: 22 MG/DL (ref 7–30)
BUN SERPL-MCNC: 23 MG/DL (ref 7–30)
BUN SERPL-MCNC: 23 MG/DL (ref 7–30)
BUN SERPL-MCNC: 25 MG/DL (ref 7–30)
BUN SERPL-MCNC: 30 MG/DL (ref 7–30)
BUN SERPL-MCNC: 54 MG/DL (ref 7–30)
C DIFF TOX B STL QL: NEGATIVE
CALCIUM SERPL-MCNC: 7.3 MG/DL (ref 8.5–10.1)
CALCIUM SERPL-MCNC: 7.3 MG/DL (ref 8.5–10.1)
CALCIUM SERPL-MCNC: 7.4 MG/DL (ref 8.5–10.1)
CALCIUM SERPL-MCNC: 7.6 MG/DL (ref 8.5–10.1)
CALCIUM SERPL-MCNC: 7.7 MG/DL (ref 8.5–10.1)
CALCIUM SERPL-MCNC: 7.9 MG/DL (ref 8.5–10.1)
CALCIUM SERPL-MCNC: 8 MG/DL (ref 8.5–10.1)
CALCIUM SERPL-MCNC: 8.7 MG/DL (ref 8.5–10.1)
CALCIUM SERPL-MCNC: 8.8 MG/DL (ref 8.5–10.1)
CALCIUM SERPL-MCNC: 8.9 MG/DL (ref 8.5–10.1)
CALCIUM SERPL-MCNC: 9.1 MG/DL (ref 8.5–10.1)
CALCIUM SERPL-MCNC: 9.1 MG/DL (ref 8.5–10.5)
CHLORIDE SERPL-SCNC: 102 MMOL/L (ref 94–109)
CHLORIDE SERPL-SCNC: 106 MMOL/L (ref 98–107)
CHLORIDE SERPL-SCNC: 107 MMOL/L (ref 94–109)
CHLORIDE SERPL-SCNC: 109 MMOL/L (ref 94–109)
CHLORIDE SERPL-SCNC: 110 MMOL/L (ref 94–109)
CHLORIDE SERPL-SCNC: 99 MMOL/L (ref 94–109)
CHLORIDE SERPL-SCNC: 99 MMOL/L (ref 94–109)
CHOLEST SERPL-MCNC: 170 MG/DL
CHOLEST SERPL-MCNC: 96 MG/DL
CO2 SERPL-SCNC: 13 MMOL/L (ref 20–32)
CO2 SERPL-SCNC: 16 MMOL/L (ref 20–32)
CO2 SERPL-SCNC: 17 MMOL/L (ref 20–32)
CO2 SERPL-SCNC: 19 MMOL/L (ref 20–32)
CO2 SERPL-SCNC: 20 MMOL/L (ref 20–32)
CO2 SERPL-SCNC: 22 MMOL/L (ref 20–32)
CO2 SERPL-SCNC: 23 MMOL/L (ref 20–32)
CO2 SERPL-SCNC: 24 MMOL/L (ref 20–32)
CO2 SERPL-SCNC: 25 MMOL/L (ref 20–32)
CO2 SERPL-SCNC: 26 MMOL/L (ref 23–29)
COLOR UR AUTO: YELLOW
CREAT SERPL-MCNC: 1.08 MG/DL (ref 0.66–1.25)
CREAT SERPL-MCNC: 1.12 MG/DL (ref 0.66–1.25)
CREAT SERPL-MCNC: 1.16 MG/DL (ref 0.66–1.25)
CREAT SERPL-MCNC: 1.17 MG/DL (ref 0.66–1.25)
CREAT SERPL-MCNC: 1.18 MG/DL (ref 0.66–1.25)
CREAT SERPL-MCNC: 1.4 MG/DL (ref 0.7–1.3)
CREAT SERPL-MCNC: 1.45 MG/DL (ref 0.66–1.25)
CREAT SERPL-MCNC: 10.1 MG/DL (ref 0.66–1.25)
CREAT SERPL-MCNC: 17.6 MG/DL (ref 0.66–1.25)
CREAT SERPL-MCNC: 19.3 MG/DL (ref 0.66–1.25)
CREAT SERPL-MCNC: 2.99 MG/DL (ref 0.66–1.25)
CREAT SERPL-MCNC: 21.8 MG/DL (ref 0.66–1.25)
CREAT UR-MCNC: 108 MG/DL
DIFFERENTIAL METHOD BLD: ABNORMAL
EOSINOPHIL # BLD AUTO: 0 10E9/L (ref 0–0.7)
EOSINOPHIL # BLD AUTO: 0.1 10E9/L (ref 0–0.7)
EOSINOPHIL NFR BLD AUTO: 1.4 %
EOSINOPHIL NFR BLD AUTO: 1.9 %
EOSINOPHIL NFR BLD AUTO: 2.3 %
EOSINOPHIL NFR BLD AUTO: 2.9 %
EOSINOPHIL NFR BLD AUTO: 3.1 %
EOSINOPHIL NFR BLD AUTO: 4 %
EOSINOPHIL NFR BLD AUTO: 4.1 %
ERYTHROCYTE [DISTWIDTH] IN BLOOD BY AUTOMATED COUNT: 13.5 % (ref 10–15)
ERYTHROCYTE [DISTWIDTH] IN BLOOD BY AUTOMATED COUNT: 13.5 % (ref 10–15)
ERYTHROCYTE [DISTWIDTH] IN BLOOD BY AUTOMATED COUNT: 13.7 % (ref 10–15)
ERYTHROCYTE [DISTWIDTH] IN BLOOD BY AUTOMATED COUNT: 13.8 % (ref 10–15)
ERYTHROCYTE [DISTWIDTH] IN BLOOD BY AUTOMATED COUNT: 14 % (ref 10–15)
ERYTHROCYTE [DISTWIDTH] IN BLOOD BY AUTOMATED COUNT: 14.3 % (ref 10–15)
ERYTHROCYTE [DISTWIDTH] IN BLOOD BY AUTOMATED COUNT: 14.4 % (ref 10–15)
FERRITIN SERPL-MCNC: 245 NG/ML (ref 26–388)
FOLATE SERPL-MCNC: 10.9 NG/ML
GAMMA GLOB SERPL ELPH-MCNC: 3.1 G/DL (ref 0.7–1.6)
GFR SERPL CREATININE-BSD FRML MDRD: 2 ML/MIN/1.7M2
GFR SERPL CREATININE-BSD FRML MDRD: 2 ML/MIN/1.7M2
GFR SERPL CREATININE-BSD FRML MDRD: 20 ML/MIN/1.7M2
GFR SERPL CREATININE-BSD FRML MDRD: 3 ML/MIN/1.7M2
GFR SERPL CREATININE-BSD FRML MDRD: 47 ML/MIN/1.7M2
GFR SERPL CREATININE-BSD FRML MDRD: 49 ML/MIN/1.7M2
GFR SERPL CREATININE-BSD FRML MDRD: 5 ML/MIN/1.7M2
GFR SERPL CREATININE-BSD FRML MDRD: 60 ML/MIN/1.7M2
GFR SERPL CREATININE-BSD FRML MDRD: 60 ML/MIN/1.7M2
GFR SERPL CREATININE-BSD FRML MDRD: 61 ML/MIN/1.7M2
GFR SERPL CREATININE-BSD FRML MDRD: 63 ML/MIN/1.7M2
GFR SERPL CREATININE-BSD FRML MDRD: 66 ML/MIN/1.7M2
GLUCOSE BLDC GLUCOMTR-MCNC: 103 MG/DL (ref 70–99)
GLUCOSE BLDC GLUCOMTR-MCNC: 108 MG/DL (ref 70–99)
GLUCOSE BLDC GLUCOMTR-MCNC: 109 MG/DL (ref 70–99)
GLUCOSE BLDC GLUCOMTR-MCNC: 120 MG/DL (ref 70–99)
GLUCOSE BLDC GLUCOMTR-MCNC: 134 MG/DL (ref 70–99)
GLUCOSE BLDC GLUCOMTR-MCNC: 160 MG/DL (ref 70–99)
GLUCOSE BLDC GLUCOMTR-MCNC: 164 MG/DL (ref 70–99)
GLUCOSE BLDC GLUCOMTR-MCNC: 172 MG/DL (ref 70–99)
GLUCOSE BLDC GLUCOMTR-MCNC: 189 MG/DL (ref 70–99)
GLUCOSE BLDC GLUCOMTR-MCNC: 94 MG/DL (ref 70–99)
GLUCOSE BLDC GLUCOMTR-MCNC: 99 MG/DL (ref 70–99)
GLUCOSE SERPL-MCNC: 101 MG/DL (ref 70–99)
GLUCOSE SERPL-MCNC: 103 MG/DL (ref 70–99)
GLUCOSE SERPL-MCNC: 103 MG/DL (ref 70–99)
GLUCOSE SERPL-MCNC: 104 MG/DL (ref 70–99)
GLUCOSE SERPL-MCNC: 104 MG/DL (ref 70–99)
GLUCOSE SERPL-MCNC: 105 MG/DL (ref 70–99)
GLUCOSE SERPL-MCNC: 106 MG/DL (ref 70–105)
GLUCOSE SERPL-MCNC: 111 MG/DL (ref 70–99)
GLUCOSE SERPL-MCNC: 114 MG/DL (ref 70–99)
GLUCOSE SERPL-MCNC: 117 MG/DL (ref 70–99)
GLUCOSE SERPL-MCNC: 118 MG/DL (ref 70–99)
GLUCOSE SERPL-MCNC: 119 MG/DL (ref 70–99)
GLUCOSE SERPL-MCNC: 120 MG/DL (ref 70–99)
GLUCOSE SERPL-MCNC: 124 MG/DL (ref 70–99)
GLUCOSE SERPL-MCNC: 125 MG/DL (ref 70–99)
GLUCOSE SERPL-MCNC: 126 MG/DL (ref 70–99)
GLUCOSE SERPL-MCNC: 134 MG/DL (ref 70–99)
GLUCOSE SERPL-MCNC: 148 MG/DL (ref 70–99)
GLUCOSE SERPL-MCNC: 156 MG/DL (ref 70–99)
GLUCOSE SERPL-MCNC: 164 MG/DL (ref 70–99)
GLUCOSE SERPL-MCNC: 170 MG/DL (ref 70–99)
GLUCOSE SERPL-MCNC: 173 MG/DL (ref 70–99)
GLUCOSE SERPL-MCNC: 90 MG/DL (ref 70–99)
GLUCOSE SERPL-MCNC: 91 MG/DL (ref 70–99)
GLUCOSE SERPL-MCNC: 94 MG/DL (ref 70–99)
GLUCOSE SERPL-MCNC: 97 MG/DL (ref 70–99)
GLUCOSE SERPL-MCNC: 99 MG/DL (ref 70–99)
GLUCOSE SERPL-MCNC: 99 MG/DL (ref 70–99)
GLUCOSE UR STRIP-MCNC: NEGATIVE MG/DL
HBA1C MFR BLD: 5.4 % (ref 0–6.4)
HCT VFR BLD AUTO: 21.4 % (ref 40–53)
HCT VFR BLD AUTO: 22.2 % (ref 40–53)
HCT VFR BLD AUTO: 22.3 % (ref 40–53)
HCT VFR BLD AUTO: 22.8 % (ref 40–53)
HCT VFR BLD AUTO: 23.5 % (ref 40–53)
HCT VFR BLD AUTO: 23.6 % (ref 40–53)
HCT VFR BLD AUTO: 23.6 % (ref 40–53)
HCT VFR BLD AUTO: 24.5 % (ref 40–53)
HCT VFR BLD AUTO: 24.7 % (ref 40–53)
HCT VFR BLD AUTO: 24.8 % (ref 40–53)
HCT VFR BLD AUTO: 32.4 % (ref 40–53)
HDLC SERPL-MCNC: 29 MG/DL
HDLC SERPL-MCNC: 36 MG/DL
HEMOCCULT STL QL: POSITIVE
HGB BLD-MCNC: 10.7 G/DL (ref 13.3–17.7)
HGB BLD-MCNC: 7.5 G/DL (ref 13.3–17.7)
HGB BLD-MCNC: 7.5 G/DL (ref 13.3–17.7)
HGB BLD-MCNC: 7.7 G/DL (ref 13.3–17.7)
HGB BLD-MCNC: 7.7 G/DL (ref 13.3–17.7)
HGB BLD-MCNC: 7.8 G/DL (ref 13.3–17.7)
HGB BLD-MCNC: 7.9 G/DL (ref 13.3–17.7)
HGB BLD-MCNC: 7.9 G/DL (ref 13.3–17.7)
HGB BLD-MCNC: 8.2 G/DL (ref 13.3–17.7)
HGB BLD-MCNC: 8.3 G/DL (ref 13.3–17.7)
HGB BLD-MCNC: 8.4 G/DL (ref 13.3–17.7)
HGB UR QL STRIP: ABNORMAL
IGA SERPL-MCNC: 8 MG/DL (ref 70–380)
IGG SERPL-MCNC: 3650 MG/DL (ref 695–1620)
IGM SERPL-MCNC: 6 MG/DL (ref 60–265)
IMM GRANULOCYTES # BLD: 0 10E9/L (ref 0–0.4)
IMM GRANULOCYTES NFR BLD: 0 %
IMM GRANULOCYTES NFR BLD: 0.2 %
IMM GRANULOCYTES NFR BLD: 0.3 %
IMM GRANULOCYTES NFR BLD: 0.4 %
IMM GRANULOCYTES NFR BLD: 0.4 %
INR PPP: 1.35 (ref 0.86–1.14)
INR PPP: 1.44 (ref 0.86–1.14)
INTERPRETATION ECG - MUSE: NORMAL
INTERPRETATION ECG - MUSE: NORMAL
IRON SATN MFR SERPL: 22 % (ref 15–46)
IRON SERPL-MCNC: 34 UG/DL (ref 35–180)
KAPPA LC UR-MCNC: 0.84 MG/DL (ref 0.33–1.94)
KAPPA LC/LAMBDA SER: 0.1 {RATIO} (ref 0.26–1.65)
KETONES UR STRIP-MCNC: NEGATIVE MG/DL
LACTATE BLD-SCNC: 1.1 MMOL/L (ref 0.7–2)
LACTATE BLD-SCNC: 2.1 MMOL/L (ref 0.7–2)
LAMBDA LC SERPL-MCNC: 8.63 MG/DL (ref 0.57–2.63)
LDLC SERPL CALC-MCNC: 113 MG/DL
LDLC SERPL CALC-MCNC: 46 MG/DL
LEUKOCYTE ESTERASE UR QL STRIP: ABNORMAL
LIPASE SERPL-CCNC: 177 U/L (ref 73–393)
LIPASE SERPL-CCNC: 487 U/L (ref 73–393)
LYMPHOCYTES # BLD AUTO: 0.5 10E9/L (ref 0.8–5.3)
LYMPHOCYTES # BLD AUTO: 0.9 10E9/L (ref 0.8–5.3)
LYMPHOCYTES # BLD AUTO: 1 10E9/L (ref 0.8–5.3)
LYMPHOCYTES # BLD AUTO: 1.3 10E9/L (ref 0.8–5.3)
LYMPHOCYTES # BLD AUTO: 1.4 10E9/L (ref 0.8–5.3)
LYMPHOCYTES # BLD AUTO: 1.5 10E9/L (ref 0.8–5.3)
LYMPHOCYTES # BLD AUTO: 1.6 10E9/L (ref 0.8–5.3)
LYMPHOCYTES NFR BLD AUTO: 18.6 %
LYMPHOCYTES NFR BLD AUTO: 23.4 %
LYMPHOCYTES NFR BLD AUTO: 31.6 %
LYMPHOCYTES NFR BLD AUTO: 37 %
LYMPHOCYTES NFR BLD AUTO: 41.4 %
LYMPHOCYTES NFR BLD AUTO: 44 %
LYMPHOCYTES NFR BLD AUTO: 47.7 %
LYMPHOCYTES NFR BLD AUTO: 47.9 %
LYMPHOCYTES NFR BLD AUTO: 53 %
M PROTEIN SERPL ELPH-MCNC: 2.9 G/DL
MAGNESIUM SERPL-MCNC: 1.4 MG/DL (ref 1.6–2.3)
MAGNESIUM SERPL-MCNC: 1.7 MG/DL (ref 1.6–2.3)
MAGNESIUM SERPL-MCNC: 2 MG/DL (ref 1.6–2.3)
MAGNESIUM SERPL-MCNC: 2.4 MG/DL (ref 1.6–2.3)
MCH RBC QN AUTO: 29.3 PG (ref 26.5–33)
MCH RBC QN AUTO: 29.3 PG (ref 26.5–33)
MCH RBC QN AUTO: 29.4 PG (ref 26.5–33)
MCH RBC QN AUTO: 29.4 PG (ref 26.5–33)
MCH RBC QN AUTO: 29.5 PG (ref 26.5–33)
MCH RBC QN AUTO: 29.6 PG (ref 26.5–33)
MCH RBC QN AUTO: 29.6 PG (ref 26.5–33)
MCH RBC QN AUTO: 29.7 PG (ref 26.5–33)
MCH RBC QN AUTO: 29.8 PG (ref 26.5–33)
MCH RBC QN AUTO: 29.8 PG (ref 26.5–33)
MCH RBC QN AUTO: 29.9 PG (ref 26.5–33)
MCHC RBC AUTO-ENTMCNC: 33 G/DL (ref 31.5–36.5)
MCHC RBC AUTO-ENTMCNC: 33.1 G/DL (ref 31.5–36.5)
MCHC RBC AUTO-ENTMCNC: 33.2 G/DL (ref 31.5–36.5)
MCHC RBC AUTO-ENTMCNC: 33.5 G/DL (ref 31.5–36.5)
MCHC RBC AUTO-ENTMCNC: 33.5 G/DL (ref 31.5–36.5)
MCHC RBC AUTO-ENTMCNC: 33.6 G/DL (ref 31.5–36.5)
MCHC RBC AUTO-ENTMCNC: 33.8 G/DL (ref 31.5–36.5)
MCHC RBC AUTO-ENTMCNC: 33.8 G/DL (ref 31.5–36.5)
MCHC RBC AUTO-ENTMCNC: 34.3 G/DL (ref 31.5–36.5)
MCHC RBC AUTO-ENTMCNC: 34.5 G/DL (ref 31.5–36.5)
MCHC RBC AUTO-ENTMCNC: 35 G/DL (ref 31.5–36.5)
MCV RBC AUTO: 85 FL (ref 78–100)
MCV RBC AUTO: 85 FL (ref 78–100)
MCV RBC AUTO: 86 FL (ref 78–100)
MCV RBC AUTO: 87 FL (ref 78–100)
MCV RBC AUTO: 88 FL (ref 78–100)
MCV RBC AUTO: 89 FL (ref 78–100)
MCV RBC AUTO: 90 FL (ref 78–100)
MONOCYTES # BLD AUTO: 0.1 10E9/L (ref 0–1.3)
MONOCYTES # BLD AUTO: 0.2 10E9/L (ref 0–1.3)
MONOCYTES # BLD AUTO: 0.3 10E9/L (ref 0–1.3)
MONOCYTES NFR BLD AUTO: 4.3 %
MONOCYTES NFR BLD AUTO: 4.7 %
MONOCYTES NFR BLD AUTO: 5.3 %
MONOCYTES NFR BLD AUTO: 5.3 %
MONOCYTES NFR BLD AUTO: 5.5 %
MONOCYTES NFR BLD AUTO: 6.2 %
MONOCYTES NFR BLD AUTO: 6.6 %
MONOCYTES NFR BLD AUTO: 8.4 %
MONOCYTES NFR BLD AUTO: 8.9 %
MRSA DNA SPEC QL NAA+PROBE: NEGATIVE
MUCOUS THREADS #/AREA URNS LPF: PRESENT /LPF
NEUTROPHILS # BLD AUTO: 1.1 10E9/L (ref 1.6–8.3)
NEUTROPHILS # BLD AUTO: 1.2 10E9/L (ref 1.6–8.3)
NEUTROPHILS # BLD AUTO: 1.3 10E9/L (ref 1.6–8.3)
NEUTROPHILS # BLD AUTO: 1.4 10E9/L (ref 1.6–8.3)
NEUTROPHILS # BLD AUTO: 1.6 10E9/L (ref 1.6–8.3)
NEUTROPHILS # BLD AUTO: 1.6 10E9/L (ref 1.6–8.3)
NEUTROPHILS # BLD AUTO: 2 10E9/L (ref 1.6–8.3)
NEUTROPHILS # BLD AUTO: 2.1 10E9/L (ref 1.6–8.3)
NEUTROPHILS # BLD AUTO: 2.9 10E9/L (ref 1.6–8.3)
NEUTROPHILS NFR BLD AUTO: 35.2 %
NEUTROPHILS NFR BLD AUTO: 41.1 %
NEUTROPHILS NFR BLD AUTO: 41.3 %
NEUTROPHILS NFR BLD AUTO: 46.1 %
NEUTROPHILS NFR BLD AUTO: 50.2 %
NEUTROPHILS NFR BLD AUTO: 56.1 %
NEUTROPHILS NFR BLD AUTO: 59.6 %
NEUTROPHILS NFR BLD AUTO: 68.3 %
NEUTROPHILS NFR BLD AUTO: 74.9 %
NITRATE UR QL: NEGATIVE
NONHDLC SERPL-MCNC: 141 MG/DL
NONHDLC SERPL-MCNC: 60 MG/DL
NRBC # BLD AUTO: 0 10*3/UL
NRBC BLD AUTO-RTO: 0 /100
PH UR STRIP: 6.5 PH (ref 5–7)
PHOSPHATE SERPL-MCNC: 1.3 MG/DL (ref 2.5–4.5)
PHOSPHATE SERPL-MCNC: 3.7 MG/DL (ref 2.5–4.5)
PLATELET # BLD AUTO: 105 10E9/L (ref 150–450)
PLATELET # BLD AUTO: 147 10E9/L (ref 150–450)
PLATELET # BLD AUTO: 56 10E9/L (ref 150–450)
PLATELET # BLD AUTO: 57 10E9/L (ref 150–450)
PLATELET # BLD AUTO: 63 10E9/L (ref 150–450)
PLATELET # BLD AUTO: 64 10E9/L (ref 150–450)
PLATELET # BLD AUTO: 69 10E9/L (ref 150–450)
PLATELET # BLD AUTO: 71 10E9/L (ref 150–450)
PLATELET # BLD AUTO: 71 10E9/L (ref 150–450)
PLATELET # BLD AUTO: 82 10E9/L (ref 150–450)
PLATELET # BLD AUTO: 84 10E9/L (ref 150–450)
POTASSIUM SERPL-SCNC: 3.1 MMOL/L (ref 3.4–5.3)
POTASSIUM SERPL-SCNC: 3.3 MMOL/L (ref 3.4–5.3)
POTASSIUM SERPL-SCNC: 3.5 MMOL/L (ref 3.4–5.3)
POTASSIUM SERPL-SCNC: 3.6 MMOL/L (ref 3.4–5.3)
POTASSIUM SERPL-SCNC: 3.6 MMOL/L (ref 3.4–5.3)
POTASSIUM SERPL-SCNC: 3.7 MMOL/L (ref 3.4–5.3)
POTASSIUM SERPL-SCNC: 3.9 MMOL/L (ref 3.4–5.3)
POTASSIUM SERPL-SCNC: 4 MMOL/L (ref 3.4–5.3)
POTASSIUM SERPL-SCNC: 4.3 MMOL/L (ref 3.4–5.3)
POTASSIUM SERPL-SCNC: 4.4 MMOL/L (ref 3.5–5.1)
POTASSIUM SERPL-SCNC: 4.5 MMOL/L (ref 3.4–5.3)
POTASSIUM SERPL-SCNC: 6 MMOL/L (ref 3.4–5.3)
POTASSIUM SERPL-SCNC: 7 MMOL/L (ref 3.4–5.3)
POTASSIUM SERPL-SCNC: 7.5 MMOL/L (ref 3.4–5.3)
PROT PATTERN SERPL ELPH-IMP: ABNORMAL
PROT PATTERN SERPL IFE-IMP: ABNORMAL
PROT SERPL-MCNC: 7.2 G/DL (ref 6.8–8.8)
PROT SERPL-MCNC: 8.3 G/DL (ref 6.8–8.8)
RBC # BLD AUTO: 2.51 10E12/L (ref 4.4–5.9)
RBC # BLD AUTO: 2.52 10E12/L (ref 4.4–5.9)
RBC # BLD AUTO: 2.58 10E12/L (ref 4.4–5.9)
RBC # BLD AUTO: 2.61 10E12/L (ref 4.4–5.9)
RBC # BLD AUTO: 2.65 10E12/L (ref 4.4–5.9)
RBC # BLD AUTO: 2.67 10E12/L (ref 4.4–5.9)
RBC # BLD AUTO: 2.69 10E12/L (ref 4.4–5.9)
RBC # BLD AUTO: 2.8 10E12/L (ref 4.4–5.9)
RBC # BLD AUTO: 2.83 10E12/L (ref 4.4–5.9)
RBC # BLD AUTO: 2.84 10E12/L (ref 4.4–5.9)
RBC # BLD AUTO: 3.6 10E12/L (ref 4.4–5.9)
RBC #/AREA URNS AUTO: >182 /HPF (ref 0–2)
SODIUM SERPL-SCNC: 131 MMOL/L (ref 133–144)
SODIUM SERPL-SCNC: 134 MMOL/L (ref 133–144)
SODIUM SERPL-SCNC: 136 MMOL/L (ref 133–144)
SODIUM SERPL-SCNC: 137 MMOL/L (ref 133–144)
SODIUM SERPL-SCNC: 138 MMOL/L (ref 133–144)
SODIUM SERPL-SCNC: 138 MMOL/L (ref 136–145)
SODIUM SERPL-SCNC: 141 MMOL/L (ref 133–144)
SODIUM SERPL-SCNC: 141 MMOL/L (ref 133–144)
SODIUM UR-SCNC: 60 MMOL/L
SOURCE: ABNORMAL
SP GR UR STRIP: 1.01 (ref 1–1.03)
SPECIMEN EXP DATE BLD: NORMAL
SPECIMEN SOURCE: NORMAL
SPECIMEN SOURCE: NORMAL
TIBC SERPL-MCNC: 157 UG/DL (ref 240–430)
TRIGL SERPL-MCNC: 138 MG/DL
TRIGL SERPL-MCNC: 71 MG/DL
TROPONIN I SERPL-MCNC: 0.03 UG/L (ref 0–0.04)
TROPONIN I SERPL-MCNC: 0.07 UG/L (ref 0–0.04)
TROPONIN I SERPL-MCNC: 0.07 UG/L (ref 0–0.04)
TROPONIN I SERPL-MCNC: 0.09 UG/L (ref 0–0.04)
UROBILINOGEN UR STRIP-MCNC: NORMAL MG/DL (ref 0–2)
VIT B12 SERPL-MCNC: 606 PG/ML (ref 193–986)
WBC # BLD AUTO: 2.8 10E9/L (ref 4–11)
WBC # BLD AUTO: 2.8 10E9/L (ref 4–11)
WBC # BLD AUTO: 2.9 10E9/L (ref 4–11)
WBC # BLD AUTO: 3 10E9/L (ref 4–11)
WBC # BLD AUTO: 3 10E9/L (ref 4–11)
WBC # BLD AUTO: 3.1 10E9/L (ref 4–11)
WBC # BLD AUTO: 3.2 10E9/L (ref 4–11)
WBC # BLD AUTO: 3.5 10E9/L (ref 4–11)
WBC # BLD AUTO: 3.6 10E9/L (ref 4–11)
WBC # BLD AUTO: 3.7 10E9/L (ref 4–11)
WBC # BLD AUTO: 4.2 10E9/L (ref 4–11)
WBC #/AREA URNS AUTO: 7 /HPF (ref 0–5)

## 2018-01-01 PROCEDURE — 83735 ASSAY OF MAGNESIUM: CPT | Performed by: INTERNAL MEDICINE

## 2018-01-01 PROCEDURE — 00000146 ZZHCL STATISTIC GLUCOSE BY METER IP

## 2018-01-01 PROCEDURE — 84132 ASSAY OF SERUM POTASSIUM: CPT | Performed by: INTERNAL MEDICINE

## 2018-01-01 PROCEDURE — 83883 ASSAY NEPHELOMETRY NOT SPEC: CPT | Performed by: INTERNAL MEDICINE

## 2018-01-01 PROCEDURE — 82570 ASSAY OF URINE CREATININE: CPT | Performed by: HOSPITALIST

## 2018-01-01 PROCEDURE — 80048 BASIC METABOLIC PNL TOTAL CA: CPT | Performed by: INTERNAL MEDICINE

## 2018-01-01 PROCEDURE — 82947 ASSAY GLUCOSE BLOOD QUANT: CPT | Performed by: HOSPITALIST

## 2018-01-01 PROCEDURE — 86334 IMMUNOFIX E-PHORESIS SERUM: CPT | Performed by: INTERNAL MEDICINE

## 2018-01-01 PROCEDURE — 74176 CT ABD & PELVIS W/O CONTRAST: CPT

## 2018-01-01 PROCEDURE — 25000128 H RX IP 250 OP 636: Performed by: NURSE PRACTITIONER

## 2018-01-01 PROCEDURE — 25000125 ZZHC RX 250: Performed by: INTERNAL MEDICINE

## 2018-01-01 PROCEDURE — 85027 COMPLETE CBC AUTOMATED: CPT | Performed by: PHYSICIAN ASSISTANT

## 2018-01-01 PROCEDURE — 72141 MRI NECK SPINE W/O DYE: CPT

## 2018-01-01 PROCEDURE — 25000132 ZZH RX MED GY IP 250 OP 250 PS 637: Performed by: INTERNAL MEDICINE

## 2018-01-01 PROCEDURE — 86900 BLOOD TYPING SEROLOGIC ABO: CPT | Performed by: HOSPITALIST

## 2018-01-01 PROCEDURE — 93005 ELECTROCARDIOGRAM TRACING: CPT

## 2018-01-01 PROCEDURE — 99221 1ST HOSP IP/OBS SF/LOW 40: CPT | Performed by: INTERNAL MEDICINE

## 2018-01-01 PROCEDURE — 36415 COLL VENOUS BLD VENIPUNCTURE: CPT | Performed by: HOSPITALIST

## 2018-01-01 PROCEDURE — 12000000 ZZH R&B MED SURG/OB

## 2018-01-01 PROCEDURE — 86850 RBC ANTIBODY SCREEN: CPT | Performed by: HOSPITALIST

## 2018-01-01 PROCEDURE — 85025 COMPLETE CBC W/AUTO DIFF WBC: CPT | Performed by: HOSPITALIST

## 2018-01-01 PROCEDURE — 87641 MR-STAPH DNA AMP PROBE: CPT | Performed by: HOSPITALIST

## 2018-01-01 PROCEDURE — 81001 URINALYSIS AUTO W/SCOPE: CPT | Performed by: HOSPITALIST

## 2018-01-01 PROCEDURE — 97535 SELF CARE MNGMENT TRAINING: CPT | Mod: GO | Performed by: OCCUPATIONAL THERAPY ASSISTANT

## 2018-01-01 PROCEDURE — 80061 LIPID PANEL: CPT | Performed by: INTERNAL MEDICINE

## 2018-01-01 PROCEDURE — 84132 ASSAY OF SERUM POTASSIUM: CPT | Performed by: EMERGENCY MEDICINE

## 2018-01-01 PROCEDURE — 82728 ASSAY OF FERRITIN: CPT | Performed by: INTERNAL MEDICINE

## 2018-01-01 PROCEDURE — 99291 CRITICAL CARE FIRST HOUR: CPT | Mod: 25

## 2018-01-01 PROCEDURE — 85610 PROTHROMBIN TIME: CPT | Performed by: NURSE PRACTITIONER

## 2018-01-01 PROCEDURE — 82784 ASSAY IGA/IGD/IGG/IGM EACH: CPT | Performed by: INTERNAL MEDICINE

## 2018-01-01 PROCEDURE — 84484 ASSAY OF TROPONIN QUANT: CPT | Performed by: NURSE PRACTITIONER

## 2018-01-01 PROCEDURE — 80048 BASIC METABOLIC PNL TOTAL CA: CPT | Performed by: HOSPITALIST

## 2018-01-01 PROCEDURE — 83605 ASSAY OF LACTIC ACID: CPT | Performed by: INTERNAL MEDICINE

## 2018-01-01 PROCEDURE — 25000132 ZZH RX MED GY IP 250 OP 250 PS 637: Performed by: EMERGENCY MEDICINE

## 2018-01-01 PROCEDURE — 97116 GAIT TRAINING THERAPY: CPT | Mod: GP | Performed by: PHYSICAL THERAPIST

## 2018-01-01 PROCEDURE — 25000132 ZZH RX MED GY IP 250 OP 250 PS 637: Performed by: HOSPITALIST

## 2018-01-01 PROCEDURE — 84165 PROTEIN E-PHORESIS SERUM: CPT | Performed by: INTERNAL MEDICINE

## 2018-01-01 PROCEDURE — 85610 PROTHROMBIN TIME: CPT | Performed by: EMERGENCY MEDICINE

## 2018-01-01 PROCEDURE — 72146 MRI CHEST SPINE W/O DYE: CPT

## 2018-01-01 PROCEDURE — 83735 ASSAY OF MAGNESIUM: CPT | Performed by: NURSE PRACTITIONER

## 2018-01-01 PROCEDURE — 97530 THERAPEUTIC ACTIVITIES: CPT | Mod: GO

## 2018-01-01 PROCEDURE — 25000125 ZZHC RX 250: Performed by: EMERGENCY MEDICINE

## 2018-01-01 PROCEDURE — 99223 1ST HOSP IP/OBS HIGH 75: CPT | Mod: AI | Performed by: HOSPITALIST

## 2018-01-01 PROCEDURE — 97162 PT EVAL MOD COMPLEX 30 MIN: CPT | Mod: GP | Performed by: PHYSICAL THERAPIST

## 2018-01-01 PROCEDURE — 40000986 XR CHEST PORT 1 VW

## 2018-01-01 PROCEDURE — 87493 C DIFF AMPLIFIED PROBE: CPT | Performed by: EMERGENCY MEDICINE

## 2018-01-01 PROCEDURE — 97166 OT EVAL MOD COMPLEX 45 MIN: CPT | Mod: GO

## 2018-01-01 PROCEDURE — 80048 BASIC METABOLIC PNL TOTAL CA: CPT | Performed by: EMERGENCY MEDICINE

## 2018-01-01 PROCEDURE — 84100 ASSAY OF PHOSPHORUS: CPT | Performed by: NURSE PRACTITIONER

## 2018-01-01 PROCEDURE — 84484 ASSAY OF TROPONIN QUANT: CPT | Performed by: EMERGENCY MEDICINE

## 2018-01-01 PROCEDURE — 99233 SBSQ HOSP IP/OBS HIGH 50: CPT | Performed by: INTERNAL MEDICINE

## 2018-01-01 PROCEDURE — 84460 ALANINE AMINO (ALT) (SGPT): CPT | Performed by: PHYSICIAN ASSISTANT

## 2018-01-01 PROCEDURE — 85025 COMPLETE CBC W/AUTO DIFF WBC: CPT | Performed by: EMERGENCY MEDICINE

## 2018-01-01 PROCEDURE — 93306 TTE W/DOPPLER COMPLETE: CPT

## 2018-01-01 PROCEDURE — 72148 MRI LUMBAR SPINE W/O DYE: CPT

## 2018-01-01 PROCEDURE — 82746 ASSAY OF FOLIC ACID SERUM: CPT | Performed by: INTERNAL MEDICINE

## 2018-01-01 PROCEDURE — 27210995 ZZH RX 272: Performed by: INTERNAL MEDICINE

## 2018-01-01 PROCEDURE — 85025 COMPLETE CBC W/AUTO DIFF WBC: CPT | Performed by: INTERNAL MEDICINE

## 2018-01-01 PROCEDURE — 40000133 ZZH STATISTIC OT WARD VISIT: Performed by: OCCUPATIONAL THERAPY ASSISTANT

## 2018-01-01 PROCEDURE — 83690 ASSAY OF LIPASE: CPT | Performed by: NURSE PRACTITIONER

## 2018-01-01 PROCEDURE — 82272 OCCULT BLD FECES 1-3 TESTS: CPT | Performed by: EMERGENCY MEDICINE

## 2018-01-01 PROCEDURE — 84100 ASSAY OF PHOSPHORUS: CPT | Performed by: INTERNAL MEDICINE

## 2018-01-01 PROCEDURE — 12000007 ZZH R&B INTERMEDIATE

## 2018-01-01 PROCEDURE — 51702 INSERT TEMP BLADDER CATH: CPT

## 2018-01-01 PROCEDURE — 25000128 H RX IP 250 OP 636: Performed by: HOSPITALIST

## 2018-01-01 PROCEDURE — 25000128 H RX IP 250 OP 636: Performed by: INTERNAL MEDICINE

## 2018-01-01 PROCEDURE — 25000125 ZZHC RX 250: Performed by: NURSE PRACTITIONER

## 2018-01-01 PROCEDURE — 99356 ZZC PROLONGED SERV,INPATIENT,1ST HR: CPT | Performed by: NURSE PRACTITIONER

## 2018-01-01 PROCEDURE — 25800025 ZZH RX 258: Performed by: EMERGENCY MEDICINE

## 2018-01-01 PROCEDURE — 96361 HYDRATE IV INFUSION ADD-ON: CPT

## 2018-01-01 PROCEDURE — 82947 ASSAY GLUCOSE BLOOD QUANT: CPT | Performed by: INTERNAL MEDICINE

## 2018-01-01 PROCEDURE — 25800025 ZZH RX 258: Performed by: INTERNAL MEDICINE

## 2018-01-01 PROCEDURE — 99292 CRITICAL CARE ADDL 30 MIN: CPT

## 2018-01-01 PROCEDURE — 97530 THERAPEUTIC ACTIVITIES: CPT | Mod: GP | Performed by: PHYSICAL THERAPIST

## 2018-01-01 PROCEDURE — 99232 SBSQ HOSP IP/OBS MODERATE 35: CPT | Performed by: INTERNAL MEDICINE

## 2018-01-01 PROCEDURE — 40000133 ZZH STATISTIC OT WARD VISIT

## 2018-01-01 PROCEDURE — 40000193 ZZH STATISTIC PT WARD VISIT: Performed by: PHYSICAL THERAPIST

## 2018-01-01 PROCEDURE — 83605 ASSAY OF LACTIC ACID: CPT | Performed by: HOSPITALIST

## 2018-01-01 PROCEDURE — 36415 COLL VENOUS BLD VENIPUNCTURE: CPT | Performed by: PHYSICIAN ASSISTANT

## 2018-01-01 PROCEDURE — 93306 TTE W/DOPPLER COMPLETE: CPT | Mod: 26 | Performed by: INTERNAL MEDICINE

## 2018-01-01 PROCEDURE — 94640 AIRWAY INHALATION TREATMENT: CPT

## 2018-01-01 PROCEDURE — 97535 SELF CARE MNGMENT TRAINING: CPT | Mod: GO

## 2018-01-01 PROCEDURE — 99214 OFFICE O/P EST MOD 30 MIN: CPT | Performed by: PHYSICIAN ASSISTANT

## 2018-01-01 PROCEDURE — 83550 IRON BINDING TEST: CPT | Performed by: INTERNAL MEDICINE

## 2018-01-01 PROCEDURE — 74018 RADEX ABDOMEN 1 VIEW: CPT

## 2018-01-01 PROCEDURE — 25000131 ZZH RX MED GY IP 250 OP 636 PS 637: Performed by: EMERGENCY MEDICINE

## 2018-01-01 PROCEDURE — G0180 MD CERTIFICATION HHA PATIENT: HCPCS | Mod: GW | Performed by: INTERNAL MEDICINE

## 2018-01-01 PROCEDURE — 99214 OFFICE O/P EST MOD 30 MIN: CPT | Performed by: INTERNAL MEDICINE

## 2018-01-01 PROCEDURE — 36415 COLL VENOUS BLD VENIPUNCTURE: CPT | Performed by: INTERNAL MEDICINE

## 2018-01-01 PROCEDURE — G0463 HOSPITAL OUTPT CLINIC VISIT: HCPCS

## 2018-01-01 PROCEDURE — 83036 HEMOGLOBIN GLYCOSYLATED A1C: CPT | Performed by: HOSPITALIST

## 2018-01-01 PROCEDURE — 80061 LIPID PANEL: CPT | Performed by: PHYSICIAN ASSISTANT

## 2018-01-01 PROCEDURE — 84484 ASSAY OF TROPONIN QUANT: CPT | Performed by: HOSPITALIST

## 2018-01-01 PROCEDURE — 85025 COMPLETE CBC W/AUTO DIFF WBC: CPT | Performed by: NURSE PRACTITIONER

## 2018-01-01 PROCEDURE — 83540 ASSAY OF IRON: CPT | Performed by: INTERNAL MEDICINE

## 2018-01-01 PROCEDURE — 82607 VITAMIN B-12: CPT | Performed by: INTERNAL MEDICINE

## 2018-01-01 PROCEDURE — 40000275 ZZH STATISTIC RCP TIME EA 10 MIN

## 2018-01-01 PROCEDURE — 83735 ASSAY OF MAGNESIUM: CPT | Performed by: HOSPITALIST

## 2018-01-01 PROCEDURE — 25000128 H RX IP 250 OP 636: Performed by: EMERGENCY MEDICINE

## 2018-01-01 PROCEDURE — 83690 ASSAY OF LIPASE: CPT | Performed by: EMERGENCY MEDICINE

## 2018-01-01 PROCEDURE — 80076 HEPATIC FUNCTION PANEL: CPT | Performed by: NURSE PRACTITIONER

## 2018-01-01 PROCEDURE — 93010 ELECTROCARDIOGRAM REPORT: CPT | Performed by: INTERNAL MEDICINE

## 2018-01-01 PROCEDURE — 87640 STAPH A DNA AMP PROBE: CPT | Performed by: HOSPITALIST

## 2018-01-01 PROCEDURE — 80048 BASIC METABOLIC PNL TOTAL CA: CPT | Performed by: NURSE PRACTITIONER

## 2018-01-01 PROCEDURE — 85027 COMPLETE CBC AUTOMATED: CPT | Performed by: HOSPITALIST

## 2018-01-01 PROCEDURE — 80053 COMPREHEN METABOLIC PANEL: CPT | Performed by: EMERGENCY MEDICINE

## 2018-01-01 PROCEDURE — 96375 TX/PRO/DX INJ NEW DRUG ADDON: CPT

## 2018-01-01 PROCEDURE — 99231 SBSQ HOSP IP/OBS SF/LOW 25: CPT | Performed by: INTERNAL MEDICINE

## 2018-01-01 PROCEDURE — 51798 US URINE CAPACITY MEASURE: CPT

## 2018-01-01 PROCEDURE — 36415 COLL VENOUS BLD VENIPUNCTURE: CPT

## 2018-01-01 PROCEDURE — 99239 HOSP IP/OBS DSCHRG MGMT >30: CPT | Performed by: INTERNAL MEDICINE

## 2018-01-01 PROCEDURE — 00000402 ZZHCL STATISTIC TOTAL PROTEIN: Performed by: INTERNAL MEDICINE

## 2018-01-01 PROCEDURE — 96365 THER/PROPH/DIAG IV INF INIT: CPT | Mod: 59

## 2018-01-01 PROCEDURE — 86901 BLOOD TYPING SEROLOGIC RH(D): CPT | Performed by: HOSPITALIST

## 2018-01-01 PROCEDURE — 80048 BASIC METABOLIC PNL TOTAL CA: CPT | Performed by: PHYSICIAN ASSISTANT

## 2018-01-01 PROCEDURE — 84300 ASSAY OF URINE SODIUM: CPT | Performed by: HOSPITALIST

## 2018-01-01 PROCEDURE — 97530 THERAPEUTIC ACTIVITIES: CPT | Mod: GO | Performed by: OCCUPATIONAL THERAPY ASSISTANT

## 2018-01-01 PROCEDURE — 25800025 ZZH RX 258: Performed by: HOSPITALIST

## 2018-01-01 RX ORDER — GABAPENTIN 100 MG/1
200 CAPSULE ORAL AT BEDTIME
Qty: 180 CAPSULE | Refills: 1 | Status: SHIPPED | OUTPATIENT
Start: 2018-01-01

## 2018-01-01 RX ORDER — ERYTHROMYCIN 20 MG/ML
SOLUTION TOPICAL DAILY
Qty: 60 ML | Refills: 0 | Status: SHIPPED | OUTPATIENT
Start: 2018-01-01 | End: 2018-01-01

## 2018-01-01 RX ORDER — ROSUVASTATIN CALCIUM 40 MG/1
40 TABLET, COATED ORAL AT BEDTIME
Qty: 90 TABLET | Refills: 3 | Status: ON HOLD | OUTPATIENT
Start: 2018-01-01 | End: 2018-01-01

## 2018-01-01 RX ORDER — CIPROFLOXACIN HYDROCHLORIDE 3.5 MG/ML
1 SOLUTION/ DROPS TOPICAL 4 TIMES DAILY
Qty: 1 BOTTLE | Refills: 0 | Status: SHIPPED | OUTPATIENT
Start: 2018-01-01 | End: 2018-01-01

## 2018-01-01 RX ORDER — CALCIUM CARBONATE 500 MG/1
1 TABLET, CHEWABLE ORAL 3 TIMES DAILY PRN
Qty: 30 TABLET | DISCHARGE
Start: 2018-01-01

## 2018-01-01 RX ORDER — POTASSIUM CHLORIDE 1500 MG/1
20-40 TABLET, EXTENDED RELEASE ORAL
Status: DISCONTINUED | OUTPATIENT
Start: 2018-01-01 | End: 2018-01-01

## 2018-01-01 RX ORDER — NALOXONE HYDROCHLORIDE 0.4 MG/ML
.1-.4 INJECTION, SOLUTION INTRAMUSCULAR; INTRAVENOUS; SUBCUTANEOUS
Status: DISCONTINUED | OUTPATIENT
Start: 2018-01-01 | End: 2018-01-01 | Stop reason: HOSPADM

## 2018-01-01 RX ORDER — POTASSIUM CHLORIDE 29.8 MG/ML
20 INJECTION INTRAVENOUS
Status: DISCONTINUED | OUTPATIENT
Start: 2018-01-01 | End: 2018-01-01

## 2018-01-01 RX ORDER — ONDANSETRON 4 MG/1
4 TABLET, ORALLY DISINTEGRATING ORAL EVERY 6 HOURS PRN
Status: DISCONTINUED | OUTPATIENT
Start: 2018-01-01 | End: 2018-01-01 | Stop reason: HOSPADM

## 2018-01-01 RX ORDER — TAMSULOSIN HYDROCHLORIDE 0.4 MG/1
0.4 CAPSULE ORAL DAILY
Qty: 90 CAPSULE | Refills: 1 | Status: SHIPPED | OUTPATIENT
Start: 2018-01-01

## 2018-01-01 RX ORDER — MAGNESIUM SULFATE HEPTAHYDRATE 40 MG/ML
4 INJECTION, SOLUTION INTRAVENOUS EVERY 4 HOURS PRN
Status: DISCONTINUED | OUTPATIENT
Start: 2018-01-01 | End: 2018-01-01

## 2018-01-01 RX ORDER — GINSENG 100 MG
CAPSULE ORAL
Status: DISCONTINUED | OUTPATIENT
Start: 2018-01-01 | End: 2018-01-01 | Stop reason: HOSPADM

## 2018-01-01 RX ORDER — SODIUM CHLORIDE AND POTASSIUM CHLORIDE 150; 900 MG/100ML; MG/100ML
INJECTION, SOLUTION INTRAVENOUS CONTINUOUS
Status: DISCONTINUED | OUTPATIENT
Start: 2018-01-01 | End: 2018-01-01

## 2018-01-01 RX ORDER — SODIUM CHLORIDE AND POTASSIUM CHLORIDE 150; 450 MG/100ML; MG/100ML
INJECTION, SOLUTION INTRAVENOUS CONTINUOUS
Status: DISCONTINUED | OUTPATIENT
Start: 2018-01-01 | End: 2018-01-01

## 2018-01-01 RX ORDER — POTASSIUM CL/LIDO/0.9 % NACL 10MEQ/0.1L
10 INTRAVENOUS SOLUTION, PIGGYBACK (ML) INTRAVENOUS ONCE
Status: COMPLETED | OUTPATIENT
Start: 2018-01-01 | End: 2018-01-01

## 2018-01-01 RX ORDER — GABAPENTIN 100 MG/1
200 CAPSULE ORAL AT BEDTIME
Status: DISCONTINUED | OUTPATIENT
Start: 2018-01-01 | End: 2018-01-01 | Stop reason: HOSPADM

## 2018-01-01 RX ORDER — DEXTROSE MONOHYDRATE 25 G/50ML
25 INJECTION, SOLUTION INTRAVENOUS ONCE
Status: COMPLETED | OUTPATIENT
Start: 2018-01-01 | End: 2018-01-01

## 2018-01-01 RX ORDER — ATORVASTATIN CALCIUM 10 MG/1
10 TABLET, FILM COATED ORAL DAILY
Qty: 30 TABLET | DISCHARGE
Start: 2018-01-01 | End: 2018-01-01

## 2018-01-01 RX ORDER — CALCIUM CARBONATE 500 MG/1
1000 TABLET, CHEWABLE ORAL
Status: DISCONTINUED | OUTPATIENT
Start: 2018-01-01 | End: 2018-01-01

## 2018-01-01 RX ORDER — ATORVASTATIN CALCIUM 10 MG/1
10 TABLET, FILM COATED ORAL DAILY
Qty: 90 TABLET | Refills: 1 | Status: SHIPPED | OUTPATIENT
Start: 2018-01-01

## 2018-01-01 RX ORDER — ONDANSETRON 2 MG/ML
4 INJECTION INTRAMUSCULAR; INTRAVENOUS EVERY 6 HOURS PRN
Status: DISCONTINUED | OUTPATIENT
Start: 2018-01-01 | End: 2018-01-01 | Stop reason: HOSPADM

## 2018-01-01 RX ORDER — GABAPENTIN 100 MG/1
200 CAPSULE ORAL AT BEDTIME
Qty: 20 CAPSULE | Refills: 1 | Status: SHIPPED | OUTPATIENT
Start: 2018-01-01 | End: 2018-01-01

## 2018-01-01 RX ORDER — SODIUM CHLORIDE 9 MG/ML
INJECTION, SOLUTION INTRAVENOUS CONTINUOUS
Status: CANCELLED | OUTPATIENT
Start: 2018-01-01

## 2018-01-01 RX ORDER — NICOTINE POLACRILEX 4 MG
15-30 LOZENGE BUCCAL
Status: DISCONTINUED | OUTPATIENT
Start: 2018-01-01 | End: 2018-01-01 | Stop reason: HOSPADM

## 2018-01-01 RX ORDER — SODIUM POLYSTYRENE SULFONATE 15 G/60ML
30 SUSPENSION ORAL; RECTAL ONCE
Status: COMPLETED | OUTPATIENT
Start: 2018-01-01 | End: 2018-01-01

## 2018-01-01 RX ORDER — ATORVASTATIN CALCIUM 10 MG/1
10 TABLET, FILM COATED ORAL DAILY
Status: DISCONTINUED | OUTPATIENT
Start: 2018-01-01 | End: 2018-01-01 | Stop reason: HOSPADM

## 2018-01-01 RX ORDER — ROSUVASTATIN CALCIUM 20 MG/1
40 TABLET, COATED ORAL AT BEDTIME
Status: DISCONTINUED | OUTPATIENT
Start: 2018-01-01 | End: 2018-01-01

## 2018-01-01 RX ORDER — POTASSIUM CHLORIDE 7.45 MG/ML
10 INJECTION INTRAVENOUS
Status: DISCONTINUED | OUTPATIENT
Start: 2018-01-01 | End: 2018-01-01

## 2018-01-01 RX ORDER — BISACODYL 10 MG
10 SUPPOSITORY, RECTAL RECTAL DAILY PRN
Status: DISCONTINUED | OUTPATIENT
Start: 2018-01-01 | End: 2018-01-01 | Stop reason: HOSPADM

## 2018-01-01 RX ORDER — SODIUM CHLORIDE 450 MG/100ML
INJECTION, SOLUTION INTRAVENOUS CONTINUOUS
Status: DISCONTINUED | OUTPATIENT
Start: 2018-01-01 | End: 2018-01-01

## 2018-01-01 RX ORDER — POTASSIUM CL/LIDO/0.9 % NACL 10MEQ/0.1L
10 INTRAVENOUS SOLUTION, PIGGYBACK (ML) INTRAVENOUS
Status: DISCONTINUED | OUTPATIENT
Start: 2018-01-01 | End: 2018-01-01

## 2018-01-01 RX ORDER — LIDOCAINE 40 MG/G
CREAM TOPICAL
Status: DISCONTINUED | OUTPATIENT
Start: 2018-01-01 | End: 2018-01-01 | Stop reason: HOSPADM

## 2018-01-01 RX ORDER — CALCIUM CARBONATE 500 MG/1
1000 TABLET, CHEWABLE ORAL
Status: DISCONTINUED | OUTPATIENT
Start: 2018-01-01 | End: 2018-01-01 | Stop reason: HOSPADM

## 2018-01-01 RX ORDER — POTASSIUM CHLORIDE 29.8 MG/ML
20 INJECTION INTRAVENOUS ONCE
Status: DISCONTINUED | OUTPATIENT
Start: 2018-01-01 | End: 2018-01-01 | Stop reason: ALTCHOICE

## 2018-01-01 RX ORDER — POTASSIUM CHLORIDE 1.5 G/1.58G
20-40 POWDER, FOR SOLUTION ORAL
Status: DISCONTINUED | OUTPATIENT
Start: 2018-01-01 | End: 2018-01-01

## 2018-01-01 RX ORDER — DEXTROSE MONOHYDRATE 100 MG/ML
INJECTION, SOLUTION INTRAVENOUS CONTINUOUS
Status: ACTIVE | OUTPATIENT
Start: 2018-01-01 | End: 2018-01-01

## 2018-01-01 RX ORDER — ALBUTEROL SULFATE 5 MG/ML
10 SOLUTION RESPIRATORY (INHALATION) ONCE
Status: COMPLETED | OUTPATIENT
Start: 2018-01-01 | End: 2018-01-01

## 2018-01-01 RX ORDER — DEXTROSE MONOHYDRATE 25 G/50ML
25-50 INJECTION, SOLUTION INTRAVENOUS
Status: DISCONTINUED | OUTPATIENT
Start: 2018-01-01 | End: 2018-01-01 | Stop reason: HOSPADM

## 2018-01-01 RX ADMIN — RANITIDINE 150 MG: 150 TABLET ORAL at 08:20

## 2018-01-01 RX ADMIN — POTASSIUM CHLORIDE: 149 INJECTION, SOLUTION, CONCENTRATE INTRAVENOUS at 10:25

## 2018-01-01 RX ADMIN — ATORVASTATIN CALCIUM 10 MG: 10 TABLET, FILM COATED ORAL at 20:41

## 2018-01-01 RX ADMIN — CALCIUM CARBONATE (ANTACID) CHEW TAB 500 MG 1000 MG: 500 CHEW TAB at 21:37

## 2018-01-01 RX ADMIN — CALCIUM CARBONATE (ANTACID) CHEW TAB 500 MG 1000 MG: 500 CHEW TAB at 17:34

## 2018-01-01 RX ADMIN — SODIUM CHLORIDE 500 ML: 9 INJECTION, SOLUTION INTRAVENOUS at 05:37

## 2018-01-01 RX ADMIN — SODIUM CHLORIDE 8 UNITS: 9 INJECTION, SOLUTION INTRAVENOUS at 16:17

## 2018-01-01 RX ADMIN — POTASSIUM CHLORIDE 40 MEQ: 1.5 POWDER, FOR SOLUTION ORAL at 11:43

## 2018-01-01 RX ADMIN — ONDANSETRON 4 MG: 4 TABLET, ORALLY DISINTEGRATING ORAL at 10:08

## 2018-01-01 RX ADMIN — POTASSIUM CHLORIDE AND SODIUM CHLORIDE: 900; 150 INJECTION, SOLUTION INTRAVENOUS at 16:46

## 2018-01-01 RX ADMIN — POTASSIUM CHLORIDE AND SODIUM CHLORIDE: 900; 150 INJECTION, SOLUTION INTRAVENOUS at 02:14

## 2018-01-01 RX ADMIN — ATORVASTATIN CALCIUM 10 MG: 10 TABLET, FILM COATED ORAL at 22:04

## 2018-01-01 RX ADMIN — FAMOTIDINE 20 MG: 10 INJECTION INTRAVENOUS at 05:54

## 2018-01-01 RX ADMIN — RANITIDINE 150 MG: 150 TABLET ORAL at 08:43

## 2018-01-01 RX ADMIN — RANITIDINE 150 MG: 150 TABLET ORAL at 08:34

## 2018-01-01 RX ADMIN — SODIUM BICARBONATE: 84 INJECTION, SOLUTION INTRAVENOUS at 11:29

## 2018-01-01 RX ADMIN — ATORVASTATIN CALCIUM 10 MG: 10 TABLET, FILM COATED ORAL at 21:26

## 2018-01-01 RX ADMIN — Medication 10 MEQ: at 01:59

## 2018-01-01 RX ADMIN — OMEPRAZOLE 20 MG: 20 CAPSULE, DELAYED RELEASE ORAL at 10:00

## 2018-01-01 RX ADMIN — CALCIUM GLUCONATE 2 G: 98 INJECTION, SOLUTION INTRAVENOUS at 16:04

## 2018-01-01 RX ADMIN — RANITIDINE 150 MG: 150 TABLET ORAL at 22:04

## 2018-01-01 RX ADMIN — RANITIDINE 150 MG: 150 TABLET ORAL at 21:34

## 2018-01-01 RX ADMIN — OMEPRAZOLE 20 MG: 20 CAPSULE, DELAYED RELEASE ORAL at 08:20

## 2018-01-01 RX ADMIN — ATORVASTATIN CALCIUM 10 MG: 10 TABLET, FILM COATED ORAL at 19:40

## 2018-01-01 RX ADMIN — ATORVASTATIN CALCIUM 10 MG: 10 TABLET, FILM COATED ORAL at 21:34

## 2018-01-01 RX ADMIN — SODIUM BICARBONATE: 84 INJECTION, SOLUTION INTRAVENOUS at 21:00

## 2018-01-01 RX ADMIN — SODIUM CHLORIDE 1000 ML: 9 INJECTION, SOLUTION INTRAVENOUS at 16:12

## 2018-01-01 RX ADMIN — ATORVASTATIN CALCIUM 10 MG: 10 TABLET, FILM COATED ORAL at 20:59

## 2018-01-01 RX ADMIN — POTASSIUM CHLORIDE 20 MEQ: 1500 TABLET, EXTENDED RELEASE ORAL at 15:50

## 2018-01-01 RX ADMIN — SODIUM CHLORIDE: 4.5 INJECTION, SOLUTION INTRAVENOUS at 07:14

## 2018-01-01 RX ADMIN — MAGNESIUM SULFATE IN WATER 4 G: 40 INJECTION, SOLUTION INTRAVENOUS at 12:23

## 2018-01-01 RX ADMIN — POTASSIUM CHLORIDE AND SODIUM CHLORIDE: 450; 150 INJECTION, SOLUTION INTRAVENOUS at 23:04

## 2018-01-01 RX ADMIN — RANITIDINE 150 MG: 150 TABLET ORAL at 08:03

## 2018-01-01 RX ADMIN — RANITIDINE 150 MG: 150 TABLET ORAL at 08:36

## 2018-01-01 RX ADMIN — Medication 10 MEQ: at 03:53

## 2018-01-01 RX ADMIN — SODIUM POLYSTYRENE SULFONATE 30 G: 15 SUSPENSION ORAL; RECTAL at 16:09

## 2018-01-01 RX ADMIN — ALBUTEROL SULFATE 10 MG: 2.5 SOLUTION RESPIRATORY (INHALATION) at 15:54

## 2018-01-01 RX ADMIN — ONDANSETRON 4 MG: 4 TABLET, ORALLY DISINTEGRATING ORAL at 12:48

## 2018-01-01 RX ADMIN — POTASSIUM CHLORIDE AND SODIUM CHLORIDE: 450; 150 INJECTION, SOLUTION INTRAVENOUS at 11:48

## 2018-01-01 RX ADMIN — SODIUM BICARBONATE 150 MEQ: 84 INJECTION INTRAVENOUS at 15:59

## 2018-01-01 RX ADMIN — GABAPENTIN 200 MG: 100 CAPSULE ORAL at 21:34

## 2018-01-01 RX ADMIN — DEXTROSE MONOHYDRATE 25 G: 25 INJECTION, SOLUTION INTRAVENOUS at 16:17

## 2018-01-01 ASSESSMENT — ENCOUNTER SYMPTOMS
VOMITING: 0
CHILLS: 0
SHORTNESS OF BREATH: 1
BLOOD IN STOOL: 0
ABDOMINAL PAIN: 0
FEVER: 0
DIARRHEA: 1
WEAKNESS: 1
NAUSEA: 0
UNEXPECTED WEIGHT CHANGE: 1

## 2018-01-01 ASSESSMENT — PAIN SCALES - GENERAL: PAINLEVEL: NO PAIN (0)

## 2018-01-01 ASSESSMENT — PAIN DESCRIPTION - DESCRIPTORS: DESCRIPTORS: BURNING;PRESSURE;OTHER (COMMENT)

## 2018-01-01 ASSESSMENT — ACTIVITIES OF DAILY LIVING (ADL): PREVIOUS_RESPONSIBILITIES: MEDICATION MANAGEMENT

## 2018-02-20 NOTE — PROGRESS NOTES
KIERRA from patient stating he would like to talk to someone about his medications. Spoke with patient. Patient states that he hasn't been taking a lot of his medications that Dr. Washington had prescribed. Patient last seen 5/30/17 by Dr. Washington. Upon review of patient's chart, the only medications he is on is ASA and a nitro patch. Patient states that he has been worked up in the past few months for multiples health issues, so some of his medications were changed or discontinued. Patient is a poor historian when asked which physician stopped or changed his medications. First patient stated that his PMD changed his medications and later in the conversation, the patient states he discontinued them on his own. Upon further review of the chart, patient's losartan and metoprolol doses were changed by his PMD on 10/20 due to hypotension and renal issues. Patient also c/o of shortness of breath lately. Advised patient to make an appointment with one of our TRINA's to review symptoms and medications. Patient verbalized understanding and agreed with plan of care. Patient connected with scheduling to make an TRINA appointment.

## 2018-02-21 NOTE — TELEPHONE ENCOUNTER
"Patient called to report that about 2-3 months ago his BP was running low and on his own stopped most of his medications. Patient states his BP still at times runs in the 60's/. Patient states he \"feels crummy, fatigued tired, and weak. \" Patient denies near syncope.   Advised patient to also contact PMD to schedule f/u OV as had been recommended and  to see if PMD would like any labs done now.   Patient agreed with plan.  Reminded of appointment with TRINA 2-23-18.   "

## 2018-02-21 NOTE — TELEPHONE ENCOUNTER
"Called and spoke to patient by phone today. I introduced myself on the phone and asked how the patient is doing? Before I could continue speaking or ask any additional questions, the patient interrupted and said \"... everything is taken care of and under control. Please do not try to call again... \" and hung up the call.  "

## 2018-02-21 NOTE — TELEPHONE ENCOUNTER
Routing to PCP and triage. Please also see the other telephone encounter created today under cardiology.    Zeyad García, CMA

## 2018-02-21 NOTE — TELEPHONE ENCOUNTER
Left message for Yolanda, nurse at [facility], with update regarding PCP call to patient.   Will also route this encounter back to Yolanda as FYI.   No action needed.     Thank you,  Natali ESPINOZA RN,BSN

## 2018-02-21 NOTE — TELEPHONE ENCOUNTER
Reason for call:  Patient reporting a symptom    Symptom or request: [facility] calling saying he has an appointment with them on Friday  And that he stopped ALL of his Medications, but he was Dx with MGUS (?)  Yolanda thinks that we need to contact the Patient as well to get him back on his Medications            Call taken on 2/21/2018 at 9:09 AM by Tashi Magana

## 2018-02-21 NOTE — TELEPHONE ENCOUNTER
Chart prep: called Dr. Kerr's office and left a message for the team that patient  called yesterday stating he stopped all his medications. Patient to see TRINA 2/23/18 to discuss cardiac meds. Patient has not been followed through [facility] providers since November 2017 with new MGUS diagnosis.

## 2018-02-22 NOTE — TELEPHONE ENCOUNTER
Call from patient, states he thinks we called him to do lab work with OV tomorrow. Reviewed with patient that no lab orders were planned through cardiology. Reviewed with patient that there are no visit notes after Nov 2017 and patient confirmed that he has not seen any other providers and did not do the bone marrow biopsy. Patient states he wants to restart his heart meds. Reviewed with patient that he has anemia and potentially other issues that may be part of his breathing problems. Patient states again his only worry right now is restarting his heart meds.

## 2018-02-23 NOTE — PATIENT INSTRUCTIONS
Today's Plan:   1) Schedule for heart ultrasound and labs.   2) We will go over the results over the phone and decide if you should be back on your heart medications.     If you have questions or concerns please call my nurse team at [phone].     Scheduling phone number: [phone]  Reminder: Please bring in all current medications, over the counter supplements and vitamin bottles to your next appointment.    It was a pleasure seeing you today!     Brent Jaime  2/23/2018

## 2018-02-23 NOTE — TELEPHONE ENCOUNTER
Pharmacy called back to say that they are unable to get the Erythromycin 2% eyedrops that Brent Jaime ordered.  Their supplier does not carry that particular eyedrop.  Please ask Brent to either send the order to another pharmacy or change the prescription.

## 2018-02-23 NOTE — MR AVS SNAPSHOT
After Visit Summary   2/23/2018    Guille Aguilar    MRN: [id number]           Patient Information     Date Of Birth          1940        Visit Information        Provider Department      2/23/2018 10:30 AM Brent Jaime PA-C [facility]        Today's Diagnoses     Coronary artery disease involving native coronary artery of native heart without angina pectoris    -  1    Mixed hyperlipidemia        HDL deficiency        Left ventricular diastolic dysfunction        SOB (shortness of breath)        Acute bacterial conjunctivitis of both eyes          Care Instructions    Today's Plan:   1) Schedule for heart ultrasound and labs.   2) We will go over the results over the phone and decide if you should be back on your heart medications.     If you have questions or concerns please call my nurse team at [phone].     Scheduling phone number: [phone]  Reminder: Please bring in all current medications, over the counter supplements and vitamin bottles to your next appointment.    It was a pleasure seeing you today!     Brent Jaime  2/23/2018              Follow-ups after your visit        Future tests that were ordered for you today     Open Future Orders        Priority Expected Expires Ordered    Basic metabolic panel Routine 3/2/2018 2/23/2019 2/23/2018    Lipid Profile Routine 3/2/2018 2/23/2019 2/23/2018    ALT Routine 3/2/2018 2/23/2019 2/23/2018    Echocardiogram Routine 3/2/2018 2/23/2019 2/23/2018    CBC with platelets Routine 3/2/2018 2/23/2019 2/23/2018            Who to contact     If you have questions or need follow up information about today's clinic visit or your schedule please contact [facility]   [facility] directly at [phone].  Normal or non-critical lab and imaging results will be communicated to you by MyChart, letter or phone within 4 business days after the clinic has received the  "results. If you do not hear from us within 7 days, please contact the clinic through [employer] or phone. If you have a critical or abnormal lab result, we will notify you by phone as soon as possible.  Submit refill requests through [employer] or call your pharmacy and they will forward the refill request to us. Please allow 3 business days for your refill to be completed.          Additional Information About Your Visit        MUBI Information     MUBI lets you send messages to your doctor, view your test results, renew your prescriptions, schedule appointments and more. To sign up, go to [email or web address] . Click on \"Log in\" on the left side of the screen, which will take you to the Welcome page. Then click on \"Sign up Now\" on the right side of the page.     You will be asked to enter the access code listed below, as well as some personal information. Please follow the directions to create your username and password.     Your access code is: [email or web address]  Expires: 2018 11:08 AM     Your access code will  in 90 days. If you need help or a new code, please call your [facility] clinic or [phone].        Care EveryWhere ID     This is your Care EveryWhere ID. This could be used by other organizations to access your [place] medical records  [id number]        Your Vitals Were     Pulse Height BMI (Body Mass Index)             84 1.88 m (6' 2\") 22.66 kg/m2          Blood Pressure from Last 3 Encounters:   18 95/64   17 118/80   11/15/17 136/89    Weight from Last 3 Encounters:   18 80.1 kg (176 lb 8 oz)   17 87.5 kg (193 lb)   11/15/17 87.5 kg (193 lb)              We Performed the Following     Follow-Up with Cardiac Advanced Practice Provider          Today's Medication Changes          These changes are accurate as of 18 11:34 AM.  If you have any questions, ask your nurse or doctor.               Start taking these medicines.        Dose/Directions    erythromycin " with ethanol 2 % ophthalmic solution   Commonly known as:  THERAMYCIN   Used for:  Acute bacterial conjunctivitis of both eyes   Started by:  Brent Jaime PA-C        Apply topically daily   Quantity:  60 mL   Refills:  0            Where to get your medicines      These medications were sent to [facility] [place] - [address]  [address], [place] [address]     Phone:  [phone]     erythromycin with ethanol 2 % ophthalmic solution                Primary Care Provider Fax #    Physician No Ref-Primary [phone]       No address on file        Equal Access to Services     [facility]: Hadii aad ku hadasho Soomaali, waaxda luqadaha, qaybta kaalmada adeegyada, geneva lilly . So [facility] [phone].    ATENCIÓN: Si habla español, tiene a puri disposición servicios gratuitos de asistencia lingüística. [facility] [phone].    We comply with applicable federal civil rights laws and [place] laws. We do not discriminate on the basis of race, color, national origin, age, disability, sex, sexual orientation, or gender identity.            Thank you!     Thank you for choosing [facility]  for your care. Our goal is always to provide you with excellent care. Hearing back from our patients is one way we can continue to improve our services. Please take a few minutes to complete the written survey that you may receive in the mail after your visit with us. Thank you!             Your Updated Medication List - Protect others around you: Learn how to safely use, store and throw away your medicines at www.disposemymeds.org.          This list is accurate as of 2/23/18 11:34 AM.  Always use your most recent med list.                   Brand Name Dispense Instructions for use Diagnosis    aspirin 81 MG tablet      1 TABLET DAILY        erythromycin with ethanol 2 % ophthalmic solution     THERAMYCIN    60 mL    Apply topically daily    Acute bacterial conjunctivitis of both eyes       NITRO-DUR 0.4 MG/HR 24 hr patch   Generic drug:  nitroGLYcerin      Place 1 patch onto the skin as needed.        OMEPRAZOLE PO      Take 20 mg by mouth every morning

## 2018-02-23 NOTE — PROGRESS NOTES
History of Present Illness:   This is a pleasant 77-year-old gentleman who presents to cardiology clinic to discuss reinitiation of his  cardiac medications.  His past medical history is notable for coronary artery disease (MI in 2016 at which time he had occlusion of his first OM which could not be open but it was well collateralized he was treated medically, in 2011 he had recurrent MI and received bare-metal stenting to his distal type posterolateral branch, later that year he ended up undergoing coronary artery bypass grafting ×4 by Dr. Lainze), hyperlipidemia (he was switched to rosuvastatin in 5/2017 and was supposed to come back for repeat lipid panel but he did not schedule any follow-up), hypertension (more recently has been having hypotension which required discontinuation of most of his cardiac medications), significant weight loss (he was seen by oncology for possible multiple myeloma and was recommended for follow-up palpation has not done this, he lost significant amount of weight which also explains his hypotension), elevated PSA level (patient also decided not to follow-up with urology), and possible early onset of dementia (this was discussed with patient and his family by his primary care provider 2 months ago), peripheral neuropathy (has seen by neurology and was set up for EMG but patient has not completed this yet), and GERD (on PPI therapy).    His last echocardiogram was in 2011 which showed preserved ejection fraction with known regional wall motion abnormalities.    Nathan returns to clinic today stating that he would like to be back on his cardiac medications as he wants to ensure that his heart health is good.  He denies any new symptoms such as chest discomfort, shortness of breath, palpitations, peripheral edema, orthopnea, or PND.  He continues to take his aspirin.  He is somewhat of a poor historian and is not able to tell me when his medications were discontinued.  It appears that his  medications may have been discontinued due to hypotension.  He lost significant amount of weight and he is aware of this.  He does not have much appetite but tries to eat 3 meals a day.  He denies any bleeding issues including melena or hematochezia.  He states that he does not want to see any providers but he wanted to see us today to make sure he is on the right heart medications and also to ensure that his heart health is good.  He is also off of his rosuvastatin and he is not sure when this was stopped or who stopped that.    He is fixated on the numbness sensation in his feet.  He wonders if I can refer him to anybody for further evaluation of this.  He denies any swelling, skin sores, skin color changes, or cold sensation.  He just feels tingling on the dorsum part of his feet.  He does not think he has diabetes.  He states that he was seen by neurology but is unable to tell me what the next steps were.  He also noticed that he has itchy eyes and wakes up each morning with crust over his eyelashes and he has some drainage as well.  He wonders if he can get eyedrops for this.  This only started 3 days ago.  He denies any vision changes or headache.  It started one eye and now he has in both eyes. It feels like he has sand in his eyes.     Physical examination:  General- NAD, thin, frail-appearing, pale  Neck- no JVD in upright position  Respiratory-clear to auscultation bilaterally with prolonged expiratory phase  Cardiac- regular rate and rhythm without murmur rub or gallop  Abdomen-soft nontender  Extremities-no edema    Assessment and plan:  This is a pleasant 77-year-old gentleman who presents to cardiology clinic to discuss reinitiation of his cardiac medications and to assess his cardiac health.  He is a patient of Dr. Washington and was last seen in May 2017 and failed to follow-up with us.  Since that time his lost significant amount of weight which I suspect due to his diagnosis of MGUS.  His past  medical history includes coronary artery disease with previous stenting and bypass grafting ×4 in 2011, hypertension (now issues with hypotension), hyperlipidemia, recent onset of renal insufficiency, GERD, and peripheral neuropathy.     This gentleman is clearly deteriorating in his health which I suspect is secondary to his diagnosis of possible MGUS.  He is somewhat of a poor historian but from what I can gather he is not interested in further workup for this.  I did have a detailed discussion with him about long-term life goals.  I recommended that he continues to have conversation with his primary care provider about what his desires are.    In regards to his cardiac medications I informed him that I do not think he has enough blood pressure to tolerate these medications and he is better off of them.  I did recommend he continues with aspirin.  At this time we will hold off of rosuvastatin.  He is very worried and we ultimately decided to order echocardiogram for baseline evaluation.  He was in agreement with this plan.  I will review the results of this with him over the phone.  We will also order baseline labs today.    I reiterated importance of having continued discussion of long-term plans.    Thank you for allowing me to participate in the care of this pleasant patient today.      This note was completed in part using Dragon voice recognition software. Although reviewed after completion, some word and grammatical errors may occur.    Orders this Visit:  Orders Placed This Encounter   Procedures     Basic metabolic panel     Lipid Profile     ALT     CBC with platelets     Echocardiogram     Orders Placed This Encounter   Medications     OMEPRAZOLE PO     Sig: Take 20 mg by mouth every morning     erythromycin with ethanol (THERAMYCIN) 2 % ophthalmic solution     Sig: Apply topically daily     Dispense:  60 mL     Refill:  0     There are no discontinued medications.      Encounter Diagnoses   Name  Primary?     Coronary artery disease involving native coronary artery of native heart without angina pectoris Yes     Mixed hyperlipidemia      HDL deficiency      Left ventricular diastolic dysfunction      SOB (shortness of breath)      Acute bacterial conjunctivitis of both eyes        CURRENT MEDICATIONS:  Current Outpatient Prescriptions   Medication Sig Dispense Refill     OMEPRAZOLE PO Take 20 mg by mouth every morning       erythromycin with ethanol (THERAMYCIN) 2 % ophthalmic solution Apply topically daily 60 mL 0     nitroGLYCERIN (NITRO-DUR) 0.4 MG/HR Place 1 patch onto the skin as needed.       ASPIRIN 81 MG OR TABS 1 TABLET DAILY         ALLERGIES     Allergies   Allergen Reactions     Dust Mites      No Known Allergies        PAST MEDICAL HISTORY:  Past Medical History:   Diagnosis Date     CAD (coronary artery disease)     CABG 2011: LIMA to LAD, SVG to OM, SVG Y-graft to posterolateral and PDA, cardiac cath 2011: BMS to OM, cath 2006: medicalmanagment     CKD (chronic kidney disease), stage III      Dental abscess      Essential hypertension, benign      Glaucoma 12/4/2009     High cholesterol 12/4/2009     Hyperlipidaemia      Left ventricular diastolic dysfunction      MGUS (monoclonal gammopathy of unknown significance)      Non Q wave myocardial infarction (H)     2006, 2011     Obesity, unspecified        PAST SURGICAL HISTORY:  Past Surgical History:   Procedure Laterality Date     CATARACT IOL, RT/LT      left     CORONARY ARTERY BYPASS  2011    CABG 2011: LIMA to LAD, SVG to OM, SVG Y-graft to posterolateral and PDA     HEART CATH, ANGIOPLASTY      2006 OM1, unsuccessful PCI-medical management       FAMILY HISTORY:  Family History   Problem Relation Age of Onset     Other - See Comments Mother 83     old age     Other - See Comments Father      fall       SOCIAL HISTORY:  Social History     Social History     Marital status:      Spouse name: N/A     Number of children: N/A     Years  "of education: N/A     Social History Main Topics     Smoking status: Never Smoker     Smokeless tobacco: Never Used     Alcohol use No     Drug use: No     Sexual activity: Not Currently     Partners: Female     Other Topics Concern     Caffeine Concern Yes     2 cups daily of coffee     Sleep Concern No     Stress Concern No     Weight Concern No     Special Diet No     Exercise Yes     walking in the mall     Parent/Sibling W/ Cabg, Mi Or Angioplasty Before 65f 55m? Yes     Social History Narrative       Review of Systems:  Skin:  Positive for itching   Eyes:  Positive for glasses  ENT:  Positive for hearing loss;nasal congestion;postnasal drainage  Respiratory:  Positive for dyspnea on exertion;shortness of breath  Cardiovascular:  Negative Positive for;lightheadedness;dizziness  Gastroenterology: Positive for poor appetite  Genitourinary:  not assessed    Musculoskeletal:  Positive for arthritis  Neurologic:  Positive for numbness or tingling of feet  Psychiatric:  Negative    Heme/Lymph/Imm:  Positive for allergies  Endocrine:  Negative      Physical Exam:  Vitals: BP 95/64  Pulse 84  Ht 1.88 m (6' 2\")  Wt 80.1 kg (176 lb 8 oz)  BMI 22.66 kg/m2   See dictation section above.     Recent Lab Results:  LIPID RESULTS:  Lab Results   Component Value Date    CHOL 181 05/26/2017    HDL 26 (L) 05/26/2017     (H) 05/26/2017    TRIG 185 (H) 05/26/2017    CHOLHDLRATIO 3.5 01/16/2015       LIVER ENZYME RESULTS:  Lab Results   Component Value Date    AST 19 10/17/2017    ALT 50 10/17/2017       CBC RESULTS:  Lab Results   Component Value Date    WBC 4.6 10/17/2017    RBC 3.79 (L) 10/17/2017    HGB 11.7 (L) 10/17/2017    HCT 35.0 (L) 10/17/2017    MCV 92 10/17/2017    MCH 30.9 10/17/2017    MCHC 33.4 10/17/2017    RDW 13.5 10/17/2017     (L) 10/17/2017       BMP RESULTS:  Lab Results   Component Value Date     10/17/2017    POTASSIUM 3.6 10/17/2017    CHLORIDE 105 10/17/2017    CO2 23 10/17/2017    " ANIONGAP 9 10/17/2017     (H) 10/17/2017    BUN 33 (H) 10/17/2017    CR 1.45 (H) 11/13/2017    GFRESTIMATED 47 (L) 11/13/2017    GFRESTBLACK 57 (L) 11/13/2017    RAKEL 9.3 10/17/2017        A1C RESULTS:  Lab Results   Component Value Date    A1C 5.9 11/10/2017       INR RESULTS:  Lab Results   Component Value Date    INR 1.32 (H) 03/28/2011    INR 1.51 (H) 03/28/2011           Brent Jaime PA-C   February 23, 2018

## 2018-03-02 NOTE — TELEPHONE ENCOUNTER
Patient called with echocardiogram results.   Will f/u as recommended.  Patient asked about HBG 10.7 and wondering if that was low. HBG in Oct 2017 was 11.6  Patient to also review HBG with PMD.

## 2018-03-02 NOTE — TELEPHONE ENCOUNTER
Patient called with labs results done today reviewed. Patient states he is not using any Non steroidal's and will try to stay hydrated.   Patient will call back with any questions or concerns.

## 2018-03-05 NOTE — TELEPHONE ENCOUNTER
Call from patient, he wants to know if he can start his heart pills again.Reviewed with patient that his BP is very low and most of his previous medications would not work well with a low BP.   Patient wants to know if he can at least start his cholesterol med.  Also reviewed with patient that he needs to see his PCP. Patient states his doctor left the clinic he used to go to. Encouraged patient to get established at a new clinic or with a new provider so he can start to manage his non-cardiac concerns. Patient states he will think about this.  Will message TRINA Brent Jaime to review.

## 2018-03-06 NOTE — TELEPHONE ENCOUNTER
Reply from TRINA Brent Jaime  He can start his cholesterol med. I had discussed that we are unable to introduce his other cardiac meds due to low blood pressure.     Thanks,     Brent     Contacted patient to restart crestor 40mg qHS. He has a few pills left, Rx escripted to [facility] mailorder. Patient states he is looking for a new provider to treat his non-cardiac concerns.

## 2018-04-09 PROBLEM — N17.9 ACUTE KIDNEY FAILURE (H): Status: ACTIVE | Noted: 2018-01-01

## 2018-04-09 NOTE — ED NOTES
.  [facility]  ED Nurse Handoff Report    ED Chief complaint: Generalized Weakness (Patient brought in by EMS from home. Painet in with complaints of worsening weakness for about one month with diarrhea for 2 weeks. States incontinent of diarrhea.) and Diarrhea      ED Diagnosis:   Final diagnoses:   Acute renal failure, unspecified acute renal failure type (H)   Anemia, unspecified type   Diarrhea, unspecified type   Hyperkalemia       Code Status: DNR / DNI    Allergies:   Allergies   Allergen Reactions     Dust Mites      No Known Allergies        Activity level - Baseline/Home:  Stand with Assist    Activity Level - Current:   Stand with Assist     Needed?: No    Isolation: Yes  Infection: Not Applicable  C-Diff Pending    Bariatric?: No    Vital Signs:   Vitals:    04/09/18 1810 04/09/18 1815 04/09/18 1823 04/09/18 1824   BP: 100/72 (!) 116/93     Resp: 15  13    Temp:    97.7  F (36.5  C)   TempSrc:    Oral   SpO2: 96% 97% 97%    Weight:       Height:           Cardiac Rhythm: ,        Pain level:      Is this patient confused?: No     Patient Report: Initial Complaint: Diarrhea & weakness  Focused Assessment: Diarrhea for 5 weeks, recent weight loss of 50lb in 6 months, general feeling of unwell. Kidney failure and K of 7.5 initially. Frequent stools.    Rubio placed and had 3000ml output.     Tests Performed: Blood, CT abd, repeat labs  Abnormal Results: CT, K (which has improved), CMP  Treatments provided: see MAR; rubio, Fluids running    Family Comments: Wife is aware but does not drive at night so she won't be coming tonight. Can call her    OBS brochure/video discussed/provided to patient: N/A    ED Medications:   Medications   dextrose 10% infusion ( Intravenous Stopped 4/9/18 6814)   dextrose 5% and 0.45% NaCl 1,000 mL with sodium bicarbonate 75 mEq/L infusion (not administered)   0.9% sodium chloride BOLUS (1,000 mLs Intravenous New Bag 4/9/18 1612)   sodium polystyrene  (KAYEXALATE) suspension 30 g (30 g Oral Given 4/9/18 1609)   calcium gluconate 2 g in D5W 100 mL intermittent infusion (0 g Intravenous Stopped 4/9/18 1627)   dextrose 50 % injection 25 g (25 g Intravenous Given 4/9/18 1617)   insulin (regular) (HumuLIN R/NovoLIN R) injection 8 Units (8 Units Intravenous Given 4/9/18 1617)   albuterol (PROVENTIL) neb solution 10 mg (10 mg Nebulization Given 4/9/18 1554)   sodium bicarbonate 8.4 % injection 150 mEq (150 mEq Intravenous Given 4/9/18 1559)       Drips infusing?:  Yes    For the majority of the shift this patient was Green.   Interventions performed were None.    Severe Sepsis OR Septic Shock Diagnosis Present: No      ED NURSE PHONE NUMBER: [phone]

## 2018-04-09 NOTE — H&P
[facility]    History and Physical  Hospitalist       Date of Admission:  4/9/2018    Assessment & Plan   Guille Aguilar is a 77 year old male with pmh of MGUS. Diastolic chf, CAD s/p CABG who presents with diarrhea and weakness found to have acute kidney injury on chronic kidney disease stage III    Acute kidney disease on chronic kidney disease stage III  Uremia  Bony spinal lesions concerning for mets  MGUS  Acute urinary retention  Likely NIKKI from urinary obstruction given the 2+ liters that were relieved with rubio catheter placement  Per the chart, he was supposed to undergo bone marrow biopsy for potential MM per Dr. Tavarez and ALSO was supposed to see a urologist for workup of his elevated PSA  Given his multiple bone mets on the CT A/P, will need to rule out spinal cord compression given his history of neuropathy and concern for MM versus prostate cancer  Will image entire spine d/t likelihood of possible multiple lesions  Leg strength currently 5/5. Endorses tingling and numbness ascending over the past few months, some left sided leg numbness  - check CK  - IVF as per nephrology, repeat potassium at 2000  - strict ins and outs  - CT abdomen and pelvis without contrast  - ua, urine sodium, urine creatinine, and urine BUN  - consult nephrology (placed stat, per ED provider the nephrologist Dr. Lutz is coming in)  - admit to ICU for close monitoring  - repeat BMP as per nephrology  - MRI of the C, L, and T spine    Update  Nephrologist at bedside, no need of ICU will place in stepdown with telemetry  No plans for dialysis at this time per the discussion with the patient  Discussed 2000 recheck of k at 4.5 with the nephrologist, advised to reduce rate to 75 cc/hr  Ok to recheck in the AM, now patient has made another 1800 cc out of urine      Hyperkalemia  EKG shows RBBB that is new, but no peaked t waves  For shifting he received 150 mEq NaHCO3, albuterol, D50 and 10 units of insulin  For  treatment he received 30 g of kayexalate, 2 grams of calcium gluconate  - bmp tonight at 2000, further bmp per nephrology  - no plan for dialysis, appreciate Dr. Huggins    Diarrhea  - enteric precautions and check c diff    Thrombocytopenia  H/o MGUS  Concern for MM or prostatism  - consult Dr. Tavarez from Oncology  - monitor platelets daily    Hemoccult positive  - no history to suggest bleeding  - cbc daily      DVT Prophylaxis: Low Risk/Ambulatory with no VTE prophylaxis indicated  Code Status: DNR/DNI. I verified with his wife grecia at home. Also the nurse Shanae was there to witness his request to avoid invasive measures    Disposition: Expected discharge in 3-5 days once renal failure improved and weakness is better.    Jason Serna, DO    Primary Care Physician   Physician No Ref-Primary    Chief Complaint   Weakness and diarrhea    History obtained from the patient    History of Present Illness   Guille Aguilar is a 77 year old male who presents with 5 weeks of diarrhea and difficulty urinating. He notes unintentional 50 lb weight loss. He notes that he recently saw his cardiologist and had a heart workup that was negative. He reports he saw a hematologist and that he did not want to undergo much workup at that point. He did not get a bone marrow biopsy and did not follow up with a urologist. He states he has been feeling progressively weaker. No chest pain or shortness of breath. He started wearing diapers 2 weeks ago. He notes the weakness is an all over weakness that is not worse one place or another. He notes a severe neuropathy over the past few weeks, with tingling that has involved some of his legs    Discussed with his wife, who state that he has been progressively weaker. He was having yellow loose bowel movements but no blood in the diapers she is changes, and no melena. She work in adult housing and specifically states no melena or black stools/. She confirms his DNR/I status    Past Medical  History    I have reviewed this patient's medical history and updated it with pertinent information if needed.   Past Medical History:   Diagnosis Date     CAD (coronary artery disease)     CABG 2011: LIMA to LAD, SVG to OM, SVG Y-graft to posterolateral and PDA, cardiac cath 2011: BMS to OM, cath 2006: medicalmanagment     CKD (chronic kidney disease), stage III      Dental abscess      Essential hypertension, benign      Glaucoma 12/4/2009     High cholesterol 12/4/2009     Hyperlipidaemia      Left ventricular diastolic dysfunction      MGUS (monoclonal gammopathy of unknown significance)      Non Q wave myocardial infarction (H)     2006, 2011     Obesity, unspecified        Past Surgical History   I have reviewed this patient's surgical history and updated it with pertinent information if needed.  Past Surgical History:   Procedure Laterality Date     CATARACT IOL, RT/LT      left     CORONARY ARTERY BYPASS  2011    CABG 2011: LIMA to LAD, SVG to OM, SVG Y-graft to posterolateral and PDA     HEART CATH, ANGIOPLASTY      2006 OM1, unsuccessful PCI-medical management       Prior to Admission Medications   Prior to Admission Medications   Prescriptions Last Dose Informant Patient Reported? Taking?   rosuvastatin (CRESTOR) 40 MG tablet   No No   Sig: Take 1 tablet (40 mg) by mouth At Bedtime      Facility-Administered Medications: None     Allergies   Allergies   Allergen Reactions     Dust Mites      No Known Allergies        Social History   I have reviewed this patient's social history and updated it with pertinent information if needed. Guille Aguilar  reports that he has never smoked. He has never used smokeless tobacco. He reports that he does not drink alcohol or use illicit drugs.    Family History   I have reviewed this patient's family history and updated it with pertinent information if needed.   Family History   Problem Relation Age of Onset     Other - See Comments Mother 83     old age     Other - See  Comments Father      fall       Review of Systems   The 10 point Review of Systems is negative other than noted in the HPI or here.     Physical Exam   Temp: 98.5  F (36.9  C) Temp src: Oral BP: (!) 138/100   Heart Rate: 80 Resp: 27 SpO2: 98 % O2 Device: None (Room air)    Vital Signs with Ranges  Temp:  [98.5  F (36.9  C)] 98.5  F (36.9  C)  Heart Rate:  [67-80] 80  Resp:  [11-27] 27  BP: (125-171)/() 138/100  SpO2:  [98 %-99 %] 98 %  179 lbs 0 oz    Constitutional: Awake, alert, cooperative, no apparent distress. Tremor after receiving albuterol  Eyes: Conjunctiva and pupils examined and normal.  HEENT: Moist mucous membranes, normal dentition.  Respiratory: Clear to auscultation bilaterally, no crackles or wheezing.  Cardiovascular: Regular rate and rhythm, normal S1 and S2, and no murmur noted.  GI: Soft, non-distended, non-tender, normal bowel sounds.  Lymph/Hematologic: No anterior cervical or supraclavicular adenopathy.  Skin: No rashes, no cyanosis, no edema.  Musculoskeletal: No joint swelling, erythema or tenderness.  Neurologic: Cranial nerves 2-12 intact, normal strength and sensation.  Psychiatric: Alert, oriented to person, place and time, no obvious anxiety or depression.    Data   Data reviewed today:  I personally reviewed no images or EKG's todayRBBB that is new compared to previous in 2011.    CT A/P  CT ABDOMEN AND PELVIS WITHOUT CONTRAST  4/9/2018 5:40 PM     HISTORY: Acute renal failure, diarrhea.      COMPARISON: A CT on 11/14/2017.     TECHNIQUE: Routine transverse CT imaging of the abdomen and pelvis was  performed without contrast. Radiation dose for this scan was reduced  using automated exposure control, adjustment of the mA and/or kV  according to patient size, or iterative reconstruction technique.     FINDINGS: There is mild atelectasis in both lung bases. The liver,  spleen, pancreas, gallbladder, and adrenal glands are normal. There  are cysts in the right kidney. The kidneys  are otherwise unremarkable.  A catheter is seen within a decompressed bladder. The prostate gland  is again noted to be markedly enlarged. No enlarged lymph node or  other abnormal mass is demonstrated. No free fluid is seen. No free  intraperitoneal gas is identified. The gastrointestinal tract is  unremarkable. The appendix is not identified. There is no additional  evidence of appendicitis. There is calcification in the vascular  structures. There has been development of innumerable lesions  throughout the osseous structures. These are predominantly sclerotic  with a few demonstrating some internal lucency. This includes a lesion  in the right iliac bone measuring 1.8 cm adjacent to the sacroiliac  joint. In the left iliac bone there are two adjacent lesions or a  single larger lesion measuring 2.7 cm in total length. Smaller lesions  are seen elsewhere in the pelvis and spine. There are degenerative  changes elsewhere in the spine. No abdominal or pelvic wall pathology  is demonstrated.          IMPRESSION:   1. Development of numerous bone lesions suspicious for metastatic  disease.  2. Again seen is enlargement of the prostate gland. A catheter is seen  within a decompressed bladder.     Recent Labs  Lab 04/09/18  1410   WBC 4.2   HGB 8.4*   MCV 86   PLT 82*   *   POTASSIUM 7.5*   CHLORIDE 99   CO2 17*   *   CR 21.80*   ANIONGAP 15*   RAKEL 8.9   *   ALBUMIN 2.2*   PROTTOTAL 8.3   BILITOTAL 0.2   ALKPHOS 71   ALT 22   AST 15   LIPASE 177   TROPI 0.034       Imaging:  No results found for this or any previous visit (from the past 24 hour(s)).

## 2018-04-09 NOTE — IP AVS SNAPSHOT
"[place] [address] ONCOLOGY: [phone]                                              INTERAGENCY TRANSFER FORM - PHYSICIAN ORDERS   2018                    Hospital Admission Date: 2018  ARCHIE ALDANA   : 1940  Sex: Male        Attending Provider: Jason Serna DO     Allergies:  Dust Mites, No Known Allergies    Infection:  None   Service:  EMERGENCY ME    Ht:  1.88 m (6' 2\")   Wt:  76.4 kg (168 lb 6.4 oz)   Admission Wt:  81.2 kg (179 lb)    BMI:  21.62 kg/m 2   BSA:  2 m 2            Patient PCP Information     Provider PCP Type    Physician No Ref-Primary General      ED Clinical Impression     Diagnosis Description Comment Added By Time Added    Acute renal failure, unspecified acute renal failure type (H) [N17.9] Acute renal failure, unspecified acute renal failure type (H) [N17.9]  Max Jay MD 2018  3:31 PM    Anemia, unspecified type [D64.9] Anemia, unspecified type [D64.9]  Max Jay MD 2018  3:31 PM    Diarrhea, unspecified type [R19.7] Diarrhea, unspecified type [R19.7]  Max Jay MD 2018  3:32 PM    Hyperkalemia [E87.5] Hyperkalemia [E87.5]  Max Jay MD 2018  3:32 PM      Hospital Problems as of 2018              Priority Class Noted POA    Essential hypertension, benign Medium  10/11/2004 Yes    Left ventricular diastolic dysfunction Medium  Unknown Yes    * (Principal)Acute kidney failure (H) Medium  2018 Yes    Urinary retention Medium  2018 Unknown    Gastroesophageal reflux disease without esophagitis Medium  2018 Unknown    Multiple myeloma not having achieved remission (H) Medium  2018 Unknown    Prostate cancer (H) Medium  2018 Unknown    Bone lesions (Multiple myeloma vs Prostate CA mets) Medium  2018 Unknown    Peripheral neuropathy Medium  2018 Unknown      Non-Hospital Problems as of 2018              Priority Class Noted    Coronary artery disease involving native " coronary artery of native heart without angina pectoris High  11/21/2008    Glaucoma Medium  12/4/2009    Obesity Medium  12/4/2009    Body mass index 28.0-28.9, adult Medium  4/21/2011    Non Q wave myocardial infarction (H) Medium  Unknown    Mixed hyperlipidemia Medium  2/11/2016    Ankle pain Medium  9/8/2016    Pain in both feet Medium  2/27/2017    Noncompliance with medication regimen Medium  5/30/2017    HDL deficiency Medium  5/30/2017    MGUS (monoclonal gammopathy of unknown significance) Medium  Unknown    CKD (chronic kidney disease), stage III Medium  Unknown      Code Status History     Date Active Date Inactive Code Status Order ID Comments User Context    4/13/2018  1:53 PM  DNR/DNI [id number]  Lily Segura MD Outpatient    4/9/2018  8:46 PM 4/13/2018  1:53 PM DNR/DNI [id number]  Jason Serna DO Inpatient    7/14/2004  1:09 PM 7/14/2004  1:09 PM  None  Igl, Laurie Demographics         Medication Review      START taking        Dose / Directions Comments    atorvastatin 10 MG tablet   Commonly known as:  LIPITOR   Used for:  Coronary artery disease involving native coronary artery of native heart without angina pectoris        Dose:  10 mg   Take 1 tablet (10 mg) by mouth daily   Quantity:  30 tablet   Refills:  0        calcium carbonate 500 MG chewable tablet   Commonly known as:  TUMS        Dose:  1 chew tab   Take 1 tablet (500 mg) by mouth 3 times daily as needed for heartburn   Quantity:  30 tablet   Refills:  0        gabapentin 100 MG capsule   Commonly known as:  NEURONTIN   Used for:  Idiopathic peripheral neuropathy        Dose:  200 mg   Take 2 capsules (200 mg) by mouth At Bedtime   Quantity:  20 capsule   Refills:  1        omeprazole 20 MG CR capsule   Commonly known as:  priLOSEC        Dose:  20 mg   Take 1 capsule (20 mg) by mouth 2 times daily   Quantity:  30 capsule   Refills:  0          STOP taking     rosuvastatin 40 MG tablet   Commonly known as:  CRESTOR                      Further instructions from your care team       PLEASE BE SURE PATIENT AND FAMILY KEEP THE FOLLOWING FOLLOW UP APPOINTMENTS AS SCHEDULED    Dr. Hart Urology 4/25 3:00 p.m.    Dr. Tavarez Oncology 4/26 2:00 p.m      Summary of Visit     Reason for your hospital stay       Renal failure and deconditioning due to suspected multiple myeloma and possible prostate cancer. You were treated with IV fluids and a urinary catheter was placed with ongoing improvement in your kidney function. A bone marrow biopsy was offered, but declined for the time being. You are discharging to a transitional care facility for rehab             After Care     Activity - Up with nursing assistance           Advance Diet as Tolerated       Follow this diet upon discharge: Regular diet, Ensure Plus (Adult); Between Meals       Fall precautions           Maya catheter       To straight gravity drainage. Change catheter every 2 weeks and PRN for leaking or decreased uring output with signs of bladder distention. DO NOT change catheter without a specific MD order IF diagnosis of benign prostatic hypertrophy (BPH), neurogenic bladder, or other urological conditions    Patient is scheduled for outpatient urology follow up on 4/25 at 3:00 p.m. With Dr. Hart located at:    [facility]  Address: [address] [place] [address]  Phone: [phone]       General info for SNF       Length of Stay Estimate: Short Term Care: Estimated # of Days <30  Condition at Discharge: Improving  Level of care:skilled   Rehabilitation Potential: Good  Admission H&P remains valid and up-to-date: Yes  Recent Chemotherapy: N/A  Use Nursing Home Standing Orders: Yes       Mantoux instructions       Give two-step Mantoux (PPD) Per Facility Policy Yes             Referrals     Occupational Therapy Adult Consult       Evaluate and treat as clinically indicated.    Reason:  Generalized weakness, deconditioning       Physical Therapy Adult Consult        Evaluate and treat as clinically indicated.    Reason:  Generalized weakness, deconditioning             Your next 10 appointments already scheduled     Apr 26, 2018  2:00 PM CDT   Return Visit with Tony Tavarez MD   [facility] ([facility])    [facility] [place]  [address]  [place] [address] [phone]              Follow-Up Appointment Instructions     Future Labs/Procedures    Follow Up and recommended labs and tests     Comments:    Follow up with transitional care physician.  The following labs/tests are recommended: basic metabolic panel within 1 week.    Follow up with Dr. Tavarez as scheduled    Follow up with [facility] in 1-2 weeks for catheter management  Patient is scheduled for outpatient urology follow up on 4/25 at 3:00 p.m. With Dr. Hart located at:     [facility]   Address: [address] [place] [address]   Phone: [phone]                  Follow-Up Appointment Instructions     Follow Up and recommended labs and tests       Follow up with transitional care physician.  The following labs/tests are recommended: basic metabolic panel within 1 week.    Follow up with Dr. Tavarez as scheduled    Follow up with [facility] in 1-2 weeks for catheter management  Patient is scheduled for outpatient urology follow up on 4/25 at 3:00 p.m. With Dr. Hart located at:     [facility]   Address: [address] [place] [address]   Phone: [phone]                         Statement of Approval     Ordered          04/17/18 1452  I have reviewed and agree with all the recommendations and orders detailed in this document.  EFFECTIVE NOW     Approved and electronically signed by:  Jarad Rodriguez MD           04/16/18 0936  I have reviewed and agree with all the recommendations and orders detailed in this document.  EFFECTIVE NOW     Approved and electronically signed by:  Jarad Rodriguez MD

## 2018-04-09 NOTE — IP AVS SNAPSHOT
"` `           [place] [address] ONCOLOGY: [phone]                                              INTERAGENCY TRANSFER FORM - NURSING   2018                    Hospital Admission Date: 2018  ARCHIE ALDANA   : 1940  Sex: Male        Attending Provider: Jason Serna DO     Allergies:  Dust Mites, No Known Allergies    Infection:  None   Service:  EMERGENCY ME    Ht:  1.88 m (6' 2\")   Wt:  76.4 kg (168 lb 6.4 oz)   Admission Wt:  81.2 kg (179 lb)    BMI:  21.62 kg/m 2   BSA:  2 m 2            Patient PCP Information     Provider PCP Type    Physician No Ref-Primary General      Current Code Status     Date Active Code Status Order ID Comments User Context       Prior      Code Status History     Date Active Date Inactive Code Status Order ID Comments User Context    2018  1:53 PM  DNR/DNI [id number]  Lily Segura MD Outpatient    2018  8:46 PM 2018  1:53 PM DNR/DNI [id number]  Jason Serna DO Inpatient    2004  1:09 PM 2004  1:09 PM  None  Igl, Laurie Demographics      Advance Directives        Scanned docmt in ACP Activity?           No scanned doc        Hospital Problems as of 2018              Priority Class Noted POA    Essential hypertension, benign Medium  10/11/2004 Yes    Left ventricular diastolic dysfunction Medium  Unknown Yes    * (Principal)Acute kidney failure (H) Medium  2018 Yes    Urinary retention Medium  2018 Unknown    Gastroesophageal reflux disease without esophagitis Medium  2018 Unknown    Multiple myeloma not having achieved remission (H) Medium  2018 Unknown    Prostate cancer (H) Medium  2018 Unknown    Bone lesions (Multiple myeloma vs Prostate CA mets) Medium  2018 Unknown    Peripheral neuropathy Medium  2018 Unknown      Non-Hospital Problems as of 2018              Priority Class Noted    Coronary artery disease involving native coronary artery of native heart " without angina pectoris High  11/21/2008    Glaucoma Medium  12/4/2009    Obesity Medium  12/4/2009    Body mass index 28.0-28.9, adult Medium  4/21/2011    Non Q wave myocardial infarction (H) Medium  Unknown    Mixed hyperlipidemia Medium  2/11/2016    Ankle pain Medium  9/8/2016    Pain in both feet Medium  2/27/2017    Noncompliance with medication regimen Medium  5/30/2017    HDL deficiency Medium  5/30/2017    MGUS (monoclonal gammopathy of unknown significance) Medium  Unknown    CKD (chronic kidney disease), stage III Medium  Unknown      Immunizations     Name Date      TD (ADULT, 7+) 11/03/98     TDAP Vaccine (Adacel) 04/21/11          END      ASSESSMENT     Discharge Profile Flowsheet     EXPECTED DISCHARGE     FINAL RESOURCES      Expected Discharge Date  04/17/18 (tcu) 04/17/18 1014   PAS Number  [id number] 04/14/18 1205    DISCHARGE NEEDS ASSESSMENT     SKIN      Equipment Currently Used at Home  none 04/17/18 1123   Inspection of bony prominences  Full 04/17/18 0941    Transportation Available  car;family or friend will provide 04/17/18 1123   Inspection under devices  Full 04/16/18 1729    # of Referrals Placed by CTS  External Care Coordination;Specialty Providers;Scheduled Follow-up appointments;Communication hand-offs to next level of Care Providers 04/16/18 1053   Skin Color/Characteristics  bruised (ecchymotic) 04/17/18 0941    GASTROINTESTINAL (ADULT,PEDIATRIC,OB)     Skin Moisture  dry 04/17/18 0941    GI WDL  WDL 04/17/18 0941   Skin Integrity  scab(s);scar(s) 04/17/18 0941    Abdominal Appearance  rounded 04/17/18 0443   Full except areas not inspected   Ear, left;Ear, right;Scapula, left;Scapula, right;Spine;Hip, left;Hip, right;Buttock, left;Buttock, right;Sacrum;Coccyx 04/17/18 0448    All Quadrants Bowel Sounds  audible and active in all quadrants 04/17/18 0941   Skin WDL  ex 04/17/18 0941    Last Bowel Movement  04/16/18 04/17/18 0941   Skin Temperature  warm 04/17/18 0941    GI  "Signs/Symptoms  diarrhea 04/10/18 1723   Skin Elasticity  slow return to original state 04/17/18 0941    Passing flatus  yes 04/17/18 0941   SAFETY      COMMUNICATION ASSESSMENT     Safety WDL  WDL 04/17/18 0941    Patient's communication style  spoken language (English or Bilingual) 04/09/18 2113   All Alarms  alarm(s) activated and audible 04/17/18 0941                 Assessment WDL (Within Defined Limits) Definitions           Safety WDL     Effective: 09/28/15    Row Information: <b>WDL Definition:</b> Bed in low position, wheels locked; call light in reach; upper side rails up x 2; ID band on<br> <font color=\"gray\"><i>Item=AS safety wdl>>List=AS safety wdl>>Version=F14</i></font>      Skin WDL     Effective: 09/28/15    Row Information: <b>WDL Definition:</b> Warm; dry; intact; elastic; without discoloration; pressure points without redness<br> <font color=\"gray\"><i>Item=AS skin wdl>>List=AS skin wdl>>Version=F14</i></font>      Vitals     Vital Signs Flowsheet     COMMENTS     Height Method  Stated 04/09/18 2315    Comments  MRI 04/10/18 0045   Height Method  Stated 04/09/18 1403    VITAL SIGNS     Weight  -- (Pt refused  to get on scale) 04/17/18 0720    Temp  97.4  F (36.3  C) 04/17/18 1223   Weight Method  Bed scale 04/16/18 0503    Temp src  Oral 04/17/18 1223   Bed Scale  Standard (fitted sheet, draw sheet/ pad, cover/flat sheet, blanket, two pillows) 04/16/18 0503    Resp  16 04/17/18 1223   BSA (Calculated - sq m)  2.02 04/09/18 2315    Pulse  80 04/17/18 1205   BMI (Calculated)  22.1 04/09/18 2315    Heart Rate  74 04/17/18 1223   POSITIONING      Pulse/Heart Rate Source  Monitor 04/17/18 1223   Body Position  independently positioning 04/17/18 0941    BP  131/84 04/17/18 1223   Head of Bed (HOB)  HOB at 20-30 degrees 04/17/18 0111    BP Location  Left arm 04/17/18 1223   Positioning/Transfer Devices  pillows;in use 04/16/18 1729    OXYGEN THERAPY     Chair  Upright in chair 04/17/18 1001    " "SpO2  97 % 04/17/18 1223   DAILY CARE      O2 Device  None (Room air) 04/17/18 1223   Activity Management  ambulated in reyes 04/17/18 1001    PAIN/COMFORT     Activity Assistance Provided  assistance, 1 person 04/17/18 1001    Patient Currently in Pain  denies 04/17/18 0106   Assistive Device Utilized  gait belt;walker 04/17/18 1001    Preferred Pain Scale  number (Numeric Rating Pain Scale) 04/14/18 2101   EKG MONITORING      Patient's Stated Pain Goal  Other (Comment) 04/12/18 0612   Cardiac Regularity  Regular 04/09/18 2027    0-10 Pain Scale  6 04/12/18 0612   Cardiac Rhythm  NSR 04/09/18 2027    Pain Location  -- (same as before ) 04/12/18 0612   TITO COMA SCALE      Pain Orientation  Other (Comment) (all across mid abdomen) 04/12/18 0607   Best Eye Response  4-->(E4) spontaneous 04/14/18 0044    Pain Descriptors  Burning;Pressure;Other (comment) (feels \"bloated\") 04/12/18 0607   Best Motor Response  6-->(M6) obeys commands 04/14/18 0044    HEIGHT AND WEIGHT     Best Verbal Response  5-->(V5) oriented 04/14/18 0044    Height  1.88 m (6' 2\") 04/09/18 2315   Fannin Coma Scale Score  15 04/14/18 0044            Patient Lines/Drains/Airways Status    Active LINES/DRAINS/AIRWAYS     Name: Placement date: Placement time: Site: Days: Last dressing change:    Urethral Catheter Coude 16 fr 04/09/18   1645   Coude   7     Peripheral IV 04/11/18 Right;Medial Hand 04/11/18   0135   Hand   6     Peripheral IV 04/12/18 Left Hand 04/12/18   0540   Hand   5     Pressure Injury 04/09/18 Buttocks 04/09/18   2100    7     Wound 04/10/18 Right;Inner Ankle Abrasion(s) 04/10/18   1000   Ankle   7             Patient Lines/Drains/Airways Status    Active PICC/CVC     None            Intake/Output Detail Report     Date Intake     Output Net    Shift P.O. I.V. IV Piggyback Total Urine Total       Day 04/16/18 0700 - 04/16/18 1459 -- -- -- -- 575 575 -575    Larissa 04/16/18 1500 - 04/16/18 2259 -- -- -- -- 450 450 -450    Noc 04/16/18 " 2300 - 04/17/18 0659 -- -- -- -- 700 700 -700    Day 04/17/18 0700 - 04/17/18 1459 360 -- -- 360 450 450 -90    Larissa 04/17/18 1500 - 04/17/18 2259 -- -- -- -- -- -- 0      Case Management/Discharge Planning     Case Management/Discharge Planning Flowsheet     REFERRAL INFORMATION     COPING/STRESS      Did the Initial Social Work Assessment result in a Social Work Case?  Yes 04/11/18 1527   Major Change/Loss/Stressor  hospitalization 04/11/18 1530    Admission Type  inpatient 04/16/18 1040   Sources Of Support  adult child(barron);spouse 04/11/18 1530    Arrived From  home or self-care 04/16/18 1040   Reaction To Health Status  adjusting 04/11/18 1530    Referral Source  interdisciplinary rounds 04/16/18 1040   EXPECTED DISCHARGE      New Steerage to  Clinics?  No 04/16/18 1040   Expected Discharge Date  04/17/18 (tcu) 04/17/18 1014    # of Referrals Placed by CTS  External Care Coordination;Specialty Providers;Scheduled Follow-up appointments;Communication hand-offs to next level of Care Providers 04/16/18 1053   DISCHARGE PLANNING      Reason For Consult  discharge planning 04/16/18 1040   Transportation Available  car;family or friend will provide 04/17/18 1123    Record Reviewed  plan of care 04/16/18 1040   FINAL RESOURCES      CTS Assigned to Case  cora lomax cc 04/16/18 1040   Equipment Currently Used at Home  none 04/17/18 1123    [facility] Name  [facility] 04/16/18 1053   PAS Number  [id number] 04/14/18 1205    Primary Care MD Name  Arnie Barboza 04/16/18 1053   ABUSE RISK SCREEN      LIVING ENVIRONMENT     QUESTION TO PATIENT:  Has a member of your family or a partner(now or in the past) intimidated, hurt, manipulated, or controlled you in any way?  no 04/09/18 2154    Lives With  spouse 04/17/18 1123   QUESTION TO PATIENT: Do you feel safe going back to the place where you are living?  yes 04/09/18 2154    Living Arrangements  house 04/17/18 1123   OBSERVATION: Is there reason to believe there  has been maltreatment of a vulnerable adult (ie. Physical/Sexual/Emotional abuse, self neglect, lack of adequate food, shelter, medical care, or financial exploitation)?  no 04/09/18 2154    ASSESSMENT OF FAMILY/SOCIAL SUPPORT     OTHER      Marital Status   04/11/18 1527   Are you depressed or being treated for depression?  No 04/09/18 2148    Who is your support system?  Wife 04/11/18 1527   HOMICIDE RISK      Spouse's Name  -- (Cesilia) 04/11/18 1527   Feels Like Hurting Others  no 04/09/18 1436    Description of Support System  Supportive;Involved 04/11/18 1527

## 2018-04-09 NOTE — IP AVS SNAPSHOT
` [place] [id number] ONCOLOGY: [phone]            Medication Administration Report for Guille Aguilar as of 18 1631   Legend:    Given Hold Not Given Due Canceled Entry Other Actions    Time Time (Time) Time  Time-Action       Inactive    Active    Linked        Medications 04/11/18 04/12/18 04/13/18 04/14/18 04/15/18 04/16/18 04/17/18    0.9% sodium chloride BOLUS  Dose: 500 mL  Freq: ONCE Route: IV  Start: 18 0615   Admin Instructions: Wide open    Admin. Amount: 500 mL  Dispense Loc: [facility] Floor Stock  Infused Over: 30 Minutes  Administrations Remainin  Volume: 500 mL                      atorvastatin (LIPITOR) tablet 10 mg  Dose: 10 mg  Freq: DAILY Route: PO  Start: 18   Admin. Amount: 1 tablet (1 × 10 mg tablet)  Last Admin: 18  Dispense Loc:  [facility] 88B      (10 mg)-Given         (10 mg)-Given         (10 mg)-Given         (10 mg)-Given         (10 mg)-Given        213 (10 mg)-Given        [ ]            bacitracin ointment  Freq: EVERY 1 HOUR PRN Route: Top  PRN Reason: other  PRN Comment: topical dermatitis  Start: 18 1327   Admin Instructions: Apply to affected area    Dispense Loc:  ADS 88B               bisacodyl (DULCOLAX) Suppository 10 mg  Dose: 10 mg  Freq: DAILY PRN Route: RE  PRN Reason: constipation  Start: 18 0548   Admin. Amount: 1 suppository (1 × 10 mg suppository)  Dispense Loc:  ADS 88B               calcium carbonate (TUMS) chewable tablet 1,000 mg  Dose: 1,000 mg  Freq: EVERY 2 HOURS PRN Route: PO  PRN Reason: heartburn  Start: 18 0535   Admin Instructions: Do not give if calcium level greater than 10 mg/dL.    Admin. Amount: 2 tablet (2 × 500 mg tablet)  Last Admin: 18  Dispense Loc:  ADS 88B          1734 (1,000 mg)-Given       213 (1,000 mg)-Given            gabapentin (NEURONTIN) capsule 200 mg  Dose: 200 mg  Freq: AT BEDTIME Route: PO  Start: 18 2200   Admin  Instructions: For peripheral neuropathy    Admin. Amount: 2 capsule (2 × 100 mg capsule)  Last Admin: 04/16/18 2134  Dispense Loc:  ADS 88B          2134 (200 mg)-Given        [ ] 2200           glucose gel 15-30 g  Dose: 15-30 g  Freq: EVERY 15 MIN PRN Route: PO  PRN Reason: low blood sugar  Start: 04/09/18 2045   Admin Instructions: Give 15 g for BG 51 to 69 mg/dL IF patient is conscious and able to swallow. Give 30 g for BG less than or equal to 50 mg/dL IF patient is conscious and able to swallow. Do NOT give glucose gel via enteral tube.  IF patient has enteral tube: give apple juice 120 mL (4 oz or 15 g of CHO) via enteral tube for BG 51 to 69 mg/dL.  Give apple juice 240 mL (8 oz or 30 g of CHO) via enteral tube for BG less than or equal to 50 mg/dL.    ~Oral gel is preferable for conscious and able to swallow patient.   ~IF gel unavailable or patient refuses may provide apple juice 120 mL (4 oz or 15 g of CHO). Document juice on I and O flowsheet.    Admin. Amount: 15-30 g  Dispense Loc:  ADS 88B  Volume: 93.75 mL              Or  dextrose 50 % injection 25-50 mL  Dose: 25-50 mL  Freq: EVERY 15 MIN PRN Route: IV  PRN Reason: low blood sugar  Start: 04/09/18 2045   Admin Instructions: Use if have IV access, BG less than 70 mg/dL and meet dose criteria below:  Dose if conscious and alert (or disorientated) and NPO = 25 mL  Dose if unconscious / not alert = 50 mL  Vesicant. For ordered doses up to 25 g, give IV Push undiluted. Give each 5g over 1 minute.    Admin. Amount: 25-50 mL  Dispense Loc:  ADS 88B  Infused Over: 1-5 Minutes  Volume: 50 mL              Or  glucagon injection 1 mg  Dose: 1 mg  Freq: EVERY 15 MIN PRN Route: SC  PRN Reason: low blood sugar  PRN Comment: May repeat x 1 only  Start: 04/09/18 2045   Admin Instructions: May give SQ or IM. ONLY use glucagon IF patient has NO IV access AND is UNABLE to swallow AND blood glucose is LESS than or EQUAL to 50 mg/dL.  Give IV Push over 1 minute.  "Reconstitute with 1mL sterile water.    Admin. Amount: 1 mg  Dispense Loc:  ADS 88B               lidocaine (LMX4) cream  Freq: EVERY 1 HOUR PRN Route: Top  PRN Reason: pain  PRN Comment: with VAD insertion or accessing implanted port.  Start: 04/09/18 2045   Admin Instructions: Do NOT give if patient has a history of allergy to any local anesthetic or any \"suresh\" product.   Apply 30 minutes prior to VAD insertion or port access.  MAX Dose:  2.5 g (  of 5 g tube)    Dispense Loc:  ADS 88B               lidocaine 1 % 1 mL  Dose: 1 mL  Freq: EVERY 1 HOUR PRN Route: OTHER  PRN Comment: mild pain with VAD insertion or accessing implanted port  Start: 04/09/18 2045   Admin Instructions: Do NOT give if patient has a history of allergy to any local anesthetic or any \"suresh\" product. MAX dose 1 mL subcutaneous OR intradermal in divided doses.    Admin. Amount: 1 mL  Dispense Loc: [facility] FLOOR STOCK  Volume: 2 mL               naloxone (NARCAN) injection 0.1-0.4 mg  Dose: 0.1-0.4 mg  Freq: EVERY 2 MIN PRN Route: IV  PRN Reason: opioid reversal  Start: 04/09/18 2045   Admin Instructions: For respiratory rate LESS than or EQUAL to 8.  Partial reversal dose:  0.1 mg titrated q 2 minutes for Analgesia Side Effects Monitoring Sedation Level of 3 (frequently drowsy, arousable, drifts to sleep during conversation).Full reversal dose:  0.4 mg bolus for Analgesia Side Effects Monitoring Sedation Level of 4 (somnolent, minimal or no response to stimulation).  For ordered doses up to 2mg give IVP. Give each 0.4mg over 15 seconds in emergency situations. For non-emergent situations further dilute in 9mL of NS to facilitate titration of response.    Admin. Amount: 0.1-0.4 mg = 0.25-1 mL Conc: 0.4 mg/mL  Dispense Loc:  ADS 88B  Volume: 1 mL               omeprazole (priLOSEC) CR capsule 20 mg  Dose: 20 mg  Freq: EVERY MORNING Route: PO  Start: 04/16/18 0930   Admin. Amount: 1 capsule (1 × 20 mg capsule)  Last Admin: 04/17/18 " 0820  Dispense Loc:  [facility]          1000 (20 mg)-Given        0820 (20 mg)-Given           ondansetron (ZOFRAN) injection 4 mg  Dose: 4 mg  Freq: EVERY 6 HOURS PRN Route: IV  PRN Reasons: nausea,vomiting  Start: 04/12/18 0524   Admin Instructions: Irritant. For ordered doses up to 4 mg, give IV Push undiluted over 2-5 minutes.    Admin. Amount: 4 mg = 2 mL Conc: 4 mg/2 mL  Dispense Loc:  [facility] 88B  Infused Over: 2-5 Minutes  Volume: 2 mL                            Or  ondansetron (ZOFRAN-ODT) ODT tab 4 mg  Dose: 4 mg  Freq: EVERY 6 HOURS PRN Route: PO  PRN Comment: Nausea and Vomiting  Start: 04/12/18 0524   Admin Instructions: Do not push through foil backing:  PEEL BACK foil and GENTLY remove.  Place on tongue immediately.  Administration with liquid is unnecessary.  With dry hands, peel back foil backing and gently remove tablet; do not push oral disintegrating tablet through foil backing; administer immediately on tongue and oral disintegrating tablet dissolves in seconds; then swallow with saliva; liquid not required.    Admin. Amount: 1 tablet (1 × 4 mg tablet)  Last Admin: 04/17/18 1248  Dispense Loc:  [facility]          1008 (4 mg)-Given        1248 (4 mg)-Given           ranitidine (ZANTAC) tablet 150 mg  Dose: 150 mg  Freq: 2 TIMES DAILY Route: PO  Start: 04/13/18 2100   Admin Instructions: Pharmacist Dose Change per: IV-PO Policy.    Admin. Amount: 1 tablet (1 × 150 mg tablet)  Last Admin: 04/17/18 0820  Dispense Loc:  [facility]       (2127)-Not Given        0843 (150 mg)-Given       (2100)-Not Given        0803 (150 mg)-Given       2204 (150 mg)-Given        0834 (150 mg)-Given       2134 (150 mg)-Given        0820 (150 mg)-Given       [ ] 2100           sodium chloride (PF) 0.9% PF flush 3 mL  Dose: 3 mL  Freq: EVERY 8 HOURS Route: IK  Start: 04/09/18 2100   Admin Instructions: And Q1H PRN, to lock peripheral IV dormant line.    Admin. Amount: 3 mL  Last Admin: 04/17/18 1249  Dispense Loc: [facility] Floor  Stock  Volume: 3 mL   Current Line: Peripheral IV 04/11/18 Right;Medial Hand    (0652)-Not Given       (1543)-Not Given       (2200)-Not Given        (1356)-Not Given       1436 (3 mL)-Given       (2115)-Not Given        (0650)-Not Given       1318 (3 mL)-Given       2127 (3 mL)-Given        0657 (3 mL)-Given       1626 (3 mL)-Given       2342 (3 mL)-Given        0803 (3 mL)-Given       1631 (3 mL)-Given       2331 (3 mL)-Given        0835 (3 mL)-Given       1734 (3 mL)-Given        (0112)-Not Given       1249 (3 mL)-Given       [ ] 1630           sodium chloride (PF) 0.9% PF flush 3 mL  Dose: 3 mL  Freq: EVERY 1 HOUR PRN Route: IK  PRN Reason: line flush  PRN Comment: for peripheral IV flush post IV meds  Start: 04/09/18 2045   Admin. Amount: 3 mL  Dispense Loc: [facility] Floor Stock  Volume: 3 mL              Discontinued Medications  Medications 04/11/18 04/12/18 04/13/18 04/14/18 04/15/18 04/16/18 04/17/18         Dose: 1,000 mg  Freq: EVERY 2 HOURS PRN Route: PO  PRN Reason: heartburn  Start: 04/16/18 0902   End: 04/16/18 0909   Admin Instructions: Do not give if calcium level greater than 10 mg/dL.    Admin. Amount: 2 tablet (2 × 500 mg tablet)          0909-Med Discontinued          Dose: 4 g  Freq: EVERY 4 HOURS PRN Route: IV  PRN Reason: magnesium supplementation  Start: 04/11/18 0845   End: 04/17/18 1151   Admin Instructions: For serum Mg++ less than 1.6 mg/dL  Give 4 g and recheck magnesium level 2 hours after dose, and next AM.    Admin. Amount: 4 g = 100 mL Conc: 4 g/100 mL  Last Admin: 04/12/18 1223  Dispense Loc: Contact Rx for dose  Infused Over: 120 Minutes  Volume: 100 mL   Current Line: Peripheral IV 04/11/18 Right;Medial Hand     1223 (4 g)-New Bag            1151-Med Discontinued         Dose: 20-40 mEq  Freq: EVERY 2 HOURS PRN Route: ORAL OR FEED  PRN Reason: potassium supplementation  Start: 04/11/18 0845   End: 04/17/18 1151   Admin Instructions: Use if unable to tolerate tablets.  If Serum K+  3.0-3.3, dose = 60 mEq po total dose (40 mEq x1 followed in 2 hours by 20 mEq x1). Recheck K+ level 4 hours after dose and the next AM.  If Serum K+ 2.5-2.9, dose = 80 mEq po total dose (40 mEq Q2H x2). Recheck K+ level 4 hours after dose and the next AM.  If Serum K+ less than 2.5, See IV order.  Dissolve packet contents in 4-8 ounces of cold water or juice.    Admin. Amount: 20-40 mEq  Last Admin: 04/11/18 1143  Dispense Loc:  ADS 88B     1143 (40 mEq)-Given             1151-Med Discontinued         Dose: 10 mEq  Freq: EVERY 1 HOUR PRN Route: IV  PRN Reason: potassium supplementation  Start: 04/11/18 0845   End: 04/17/18 1151   Admin Instructions: Infuse via PERIPHERAL LINE. Use potassium with lidocaine for pain with peripheral administration.  If Serum K+ 3.0-3.3, dose = 10 mEq/hr x4 doses (40 mEq IV total dose). Recheck K+ level 2 hours after dose and the next AM.  If Serum K+ less than 3.0, dose = 10 mEq/hr x6 doses (60 mEq IV total dose). Recheck K+ level 2 hours after dose and the next AM.    Admin. Amount: 10 mEq = 100 mL Conc: 10 mEq/100 mL  Dispense Loc:  ADS 88 TOWER  Infused Over: 1 Hours  Volume: 100 mL           [address] [place] [address] Discontinued         Dose: 10 mEq  Freq: EVERY 1 HOUR PRN Route: IV  PRN Reason: potassium supplementation  Start: 04/11/18 0845   End: 04/17/18 1151   Admin Instructions: Infuse via PERIPHERAL LINE or CENTRAL LINE. Use for central line replacement if patient weight less than 65 kg, if patient is on TPN with high potassium content or if unit does not stock 20 mEq bags.   If Serum K+ 3.0-3.3, dose = 10 mEq/hr x4 doses (40 mEq IV total dose). Recheck K+ level 2 hours after dose and the next AM.   If Serum K+ less than 3.0, dose = 10 mEq/hr x6 doses (60 mEq IV total dose). Recheck K+ level 2 hours after dose and the next AM.    Admin. Amount: 10 mEq = 100 mL Conc: 10 mEq/100 mL  Dispense Loc: Contact Rx for dose  Infused Over: 60 Minutes  Volume: 100 mL           1151-Med Discontinued          Dose: 20 mEq  Freq: EVERY 1 HOUR PRN Route: IV  PRN Reason: potassium supplementation  Start: 04/11/18 0845   End: 04/17/18 1151   Admin Instructions: Infuse via CENTRAL LINE Only. May need EKG if less than 65 kg or on TPN - Max rate is 0.3 mEq/kg/hr for patients not on EKG monitoring.   If Serum K+ 3.0-3.3, dose = 20 mEq/hr x2 doses (40 mEq IV total dose). Recheck K+ level 2 hours after dose and the next AM.  If Serum K+ less than 3.0, dose = 20 mEq/hr x3 doses (60 mEq IV total dose). Recheck K+ level 2 hours after dose and the next AM.    Admin. Amount: 20 mEq = 50 mL Conc: 20 mEq/50 mL  Dispense Loc: Contact Rx for dose  Volume: 50 mL           [facility] Discontinued         Dose: 20-40 mEq  Freq: EVERY 2 HOURS PRN Route: PO  PRN Reason: potassium supplementation  Start: 04/11/18 0845   End: 04/17/18 1151   Admin Instructions: Use if able to take PO.   If Serum K+ 3.0-3.3, dose = 60 mEq po total dose (40 mEq x1 followed in 2 hours by 20 mEq x1). Recheck K+ level 4 hours after dose and the next AM.  If Serum K+ 2.5-2.9, dose = 80 mEq po total dose (40 mEq Q2H x2). Recheck K+ level 4 hours after dose and the next AM.  If Serum K+ less than 2.5, See IV order.  DO NOT CRUSH    Admin. Amount: 1-2 tablet (1-2 × 20 mEq tablet)  Last Admin: 04/11/18 1550  Dispense Loc: [facility] 88B     1550 (20 mEq)-Given             [facility] Discontinued    Medications 04/11/18 04/12/18 04/13/18 04/14/18 04/15/18 04/16/18 04/17/18

## 2018-04-09 NOTE — CONSULTS
[facility]    RENAL CONSULTATION NOTE    REFERRING MD:  Dr. Jay (ER)    REASON FOR CONSULTATION:  NIKKI c severe hyperkalemia    DATE OF CONSULTATION: 04/09/18    SHORTHAND KEY FOR MY NOTES:  c = with, s = without, p = after, a = before, x = except, asx = asymptomatic, tx = transplant or treatment, sx = symptoms or symptomatic, cx = canceled or culture, rxn = reaction, yday = yesterday, nl = normal, abx = antibiotics, fxn = function, dx = diagnosis, dz = disease, m/h = melena/hematochezia, c/d/l/ha = cramping/dizziness/lightheadedness/headache, d/c = discharge or diarrhea/constipation, f/c/n/v = fevers/chills/nausea/vomiting, cp/sob = chest pain/shortness of breath.    HPI: Guille Aguilar is a 77 year old male c CKD III 2 ? who was admitted on 4/9/2018 c c/o diarrhea x 5 wks and found to have oliguric NIKKI c severe hyperkalemia, met acidosis.      Pt noted that he's been having 4-5 episodes of diarrhea daily for the past 5 wks.  He didn't have any bloody stools.  In addition, he states that his uo decreased around the same time.  He had prostatic sx prior to this hospitalization.  The diarrhea was bad enough that he started to wear adult diapers a couple of wks ago.  Since then, he has been progressively more weak.  Eventually, EMS was activated.    In the ER, pt was pretty weak and labs showed multiple abnormalities.  His k was quite high and he has been treated c med mgmt.     His abd was firm and a rubio was placed c 2.6L of urine out right away.  He denied any f/c/n/v.  He is hungry and his upper extremity strength has been ok.  No cp/sob.    Reviewing records, the pt was seeing Dr. Tavarez for possible MM and didn't complete the w/u.  His cr was 1.4 last month.    ROS:  A complete review of systems was performed and is x as noted above.    PMH:    Past Medical History:   Diagnosis Date     CAD (coronary artery disease)     CABG 2011: LIMA to LAD, SVG to OM, SVG Y-graft to posterolateral and PDA,  "cardiac cath 2011: BMS to OM, cath 2006: medicalmanagment     CKD (chronic kidney disease), stage III      Dental abscess      Essential hypertension, benign      Glaucoma 12/4/2009     High cholesterol 12/4/2009     Hyperlipidaemia      Left ventricular diastolic dysfunction      MGUS (monoclonal gammopathy of unknown significance)      Non Q wave myocardial infarction (H)     2006, 2011     Obesity, unspecified      PSH:    Past Surgical History:   Procedure Laterality Date     CATARACT IOL, RT/LT      left     CORONARY ARTERY BYPASS  2011    CABG 2011: LIMA to LAD, SVG to OM, SVG Y-graft to posterolateral and PDA     HEART CATH, ANGIOPLASTY      2006 OM1, unsuccessful PCI-medical management     MEDICATIONS:      ALLERGIES:    Allergies as of 04/09/2018 - Nicola as Reviewed 04/09/2018   Allergen Reaction Noted     Dust mites  01/29/2009     No known allergies  07/14/2004     FH:    Family History   Problem Relation Age of Onset     Other - See Comments Mother 83     old age     Other - See Comments Father      fall     SH:    Social History     Social History     Marital status:      Spouse name: N/A     Number of children: N/A     Years of education: N/A     Occupational History     Not on file.     Social History Main Topics     Smoking status: Never Smoker     Smokeless tobacco: Never Used     Alcohol use No     Drug use: No     Sexual activity: Not Currently     Partners: Female     Other Topics Concern     Caffeine Concern Yes     2 cups daily of coffee     Sleep Concern No     Stress Concern No     Weight Concern No     Special Diet No     Exercise Yes     walking in the mall     Parent/Sibling W/ Cabg, Mi Or Angioplasty Before 65f 55m? Yes     Social History Narrative     PHYSICAL EXAM:    /62  Temp 98.5  F (36.9  C) (Oral)  Resp 15  Ht 1.88 m (6' 2\")  Wt 81.2 kg (179 lb)  SpO2 96%  BMI 22.98 kg/m2    GENERAL: awake, alert, NAD  HEENT:  Normocephalic. No gross abnormalities.  Dry mouth.  " Dentition is ok.  Pupils equal.  EOMI.  No scleral icterus.  CV: Reg, tachy, nl S1/S2, no significant ble edema.  RESP: Clear bilaterally with good efforts  GI: Abdomen o/s/nt/nd, BS present.  MUSCULOSKELETAL: extremities nl - no gross deformities noted  SKIN: no suspicious lesions or rashes, dry to touch  NEURO:  Strength normal and symmetric. Very twitchy.  + asterixis.  PSYCH: mood good, affect appropriate  LYMPH: No palpable ant/post cervical and supraclavicular adenopathy  OTHER:  + rubio c red urine    LABS:      CBC RESULTS:     Recent Labs  Lab 04/09/18  1410   WBC 4.2   RBC 2.84*   HGB 8.4*   HCT 24.5*   PLT 82*     BMP RESULTS:    Recent Labs  Lab 04/09/18  1615 04/09/18  1410   NA  --  131*   POTASSIUM 7.0* 7.5*   CHLORIDE  --  99   CO2  --  17*   BUN  --  187*   CR  --  21.80*   GLC  --  103*   RAKEL  --  8.9     INR  Recent Labs  Lab 04/09/18  1615   INR 1.44*      DIAGNOSTICS:  Personally reviewed abd CT - prelim report - large prostate, multiple lucencies    A/P:  Guille Aguilar is a 77 year old male c CKD III 2 ? who has significant NIKKI 2 obst, hyperkalemia, met acidosis.      1.  NIKKI/CKD c severe hyperkalemia.  Pt had a cr of 1.4 last month and he presented c a cr of 21.8.  He is a bit twitchy, but doesn't have many other uremic sx.  He is dry and is receiving IVF.  His k is up due to the acidosis.  Pt had a significant obstruction and had 2.6L of urine immediately p rubio placement.   The etiology of the obst is prob the prostate.  His CKD is potentially from MM.  Pt is not interested in long-term HD and prob won't need it now.  If he does, though, he will likely consent to short-term HD.    A.  Follow labs, uo, sx.  B.  Continue IVF, but change to include bicarb.  C.  Avoid nephrotoxics.  D.  Check labs again at 2000 and adjust plan prn.  E.  Med mgmt of high k.    2.  Probable MM.  Pt had a w/u c Dr. Tavarez in Nov 2017.  His kappa/lambda ratio was low and his SPEP/UPEP/immunofix studies were abn.   Now, he has multiple lucencies on CT.  He has neuropathy, too, which is prob related.  A.  Onc consult c Dr. Tavarez tmrw.    3.  Anemia.  Related to #2, primarily.  A.  Follow labs, clinically.    4.  Hypotension.  Pt's BP is on the low side, likely from the diarrhea.  A.  Continue IVF.  B.  Follow clinically.    5.  Code Status.  Pt is DNR/DNI.      Thank you for this consultation. We will follow c you.  Please call if any questions.    Attestation:   I have reviewed today's relevant vital signs, notes, medications, labs and imaging.    Gregorio Lutz MD  [facility] - Nephrology  [phone]

## 2018-04-09 NOTE — ED NOTES
Bed: ED21  Expected date: 4/9/18  Expected time: 1:51 PM  Means of arrival: Ambulance  Comments:  444- weakness/diarrhea

## 2018-04-09 NOTE — LETTER
Transition Communication Hand-off for Care Transitions to Next Level of Care Provider    Name: Guille Aguilar  : 1940  MRN #: [id number]  Primary Care Provider: Physician No Ref-Primary  Primary Care MD Name: Gaitan Alice  Primary Clinic: No address on file  Primary [facility]: [facility]  Reason for Hospitalization:  Hyperkalemia [E87.5]  Acute renal failure, unspecified acute renal failure type (H) [N17.9]  Anemia, unspecified type [D64.9]  Diarrhea, unspecified type [R19.7]  Admit Date/Time: 2018  2:15 PM  Discharge Date: 18  Reason for Communication Hand-off Referral: Fragility  Multiple providers/specialties    Discharge Plan: patient admitted for acute kidney disease multiple myeloma with nestor. Malignancy.  Patient received IV fluids pain control and PT/OT.  Recommendation is for the patient to go to TCU for strengthening.  Patient required rubio for retention and outpatient follow up has been scheduled for possible cysto/PSA/biopsy.  Patient declined a bone marrow biopsy but is scheduled for an outpatient hem/onc.         Concern for non-adherence with plan of care:   Y/N n  Discharge Needs Assessment:  Needs       Most Recent Value    Equipment Currently Used at Home none    Transportation Available car, family or friend will provide [Pt no longer drives. Family provides. ]    # of Referrals Placed by [facility] External Care Coordination, Specialty Providers, Scheduled Follow-up appointments, Communication hand-offs to next level of Care Providers    PAS Number [id number]          Follow-up specialty is recommended: Yes and urology follow up on  with DR. Hart    Follow-up plan:  Future Appointments  Date Time Provider Department Center   2018 2:00 PM Tony Tavarez MD [facility]       Any outstanding tests or procedures:        Referrals     Future Labs/Procedures    Occupational Therapy Adult Consult     Comments:    Evaluate and treat as clinically indicated.    Reason:   Generalized weakness, deconditioning    Physical Therapy Adult Consult     Comments:    Evaluate and treat as clinically indicated.    Reason:  Generalized weakness, deconditioning            Key Recommendations:      Yudi Heath    AVS/Discharge Summary is the source of truth; this is a helpful guide for improved communication of patient story

## 2018-04-09 NOTE — ED NOTES
DATE:  4/9/2018   TIME OF RECEIPT FROM LAB:  4:44 PM  LAB TEST:  potassium  LAB VALUE:  7.0  RESULTS GIVEN WITH READ-BACK TO (PROVIDER):  Max Jay MD  TIME LAB VALUE REPORTED TO PROVIDER:   4:44 PM

## 2018-04-09 NOTE — IP AVS SNAPSHOT
"          [place] [address] ONCOLOGY: [phone]                                              INTERAGENCY TRANSFER FORM - LAB / IMAGING / EKG / EMG RESULTS   2018                    Hospital Admission Date: 2018  ARCHIE ALDANA   : 1940  Sex: Male        Attending Provider: Jason Serna DO     Allergies:  Dust Mites, No Known Allergies    Infection:  None   Service:  EMERGENCY ME    Ht:  1.88 m (6' 2\")   Wt:  76.4 kg (168 lb 6.4 oz)   Admission Wt:  81.2 kg (179 lb)    BMI:  21.62 kg/m 2   BSA:  2 m 2            Patient PCP Information     Provider PCP Type    Physician No Ref-Primary General         Lab Results - 3 Days      CBC with platelets differential [[id number]] (Abnormal)  Resulted: 18 0753, Result status: Final result    Ordering provider: Jason Serna DO  18 0000 Resulting lab: [facility]    Specimen Information    Type Source Collected On   Blood  18 0652          Components       Value Reference Range Flag Lab   WBC 3.0 4.0 - 11.0 10e9/L L FrStHsLb   RBC Count 2.65 4.4 - 5.9 10e12/L L FrStHsLb   Hemoglobin 7.8 13.3 - 17.7 g/dL L FrStHsLb   Hematocrit 23.6 40.0 - 53.0 % L FrStHsLb   MCV 89 78 - 100 fl  FrStHsLb   MCH 29.4 26.5 - 33.0 pg  FrStHsLb   MCHC 33.1 31.5 - 36.5 g/dL  FrStHsLb   RDW 13.5 10.0 - 15.0 %  FrStHsLb   Platelet Count 105 150 - 450 10e9/L L FrStHsLb   Diff Method Automated Method   FrStHsLb   % Neutrophils 35.2 %  FrStHsLb   % Lymphocytes 53.0 %  FrStHsLb   % Monocytes 8.9 %  FrStHsLb   % Eosinophils 2.3 %  FrStHsLb   % Basophils 0.3 %  FrStHsLb   % Immature Granulocytes 0.3 %  FrStHsLb   Absolute Neutrophil 1.1 1.6 - 8.3 10e9/L L FrStHsLb   Absolute Lymphocytes 1.6 0.8 - 5.3 10e9/L  FrStHsLb   Absolute Monocytes 0.3 0.0 - 1.3 10e9/L  FrStHsLb   Absolute Eosinophils 0.1 0.0 - 0.7 10e9/L  FrStHsLb   Absolute Basophils 0.0 0.0 - 0.2 10e9/L  FrStHsLb   Abs Immature Granulocytes 0.0 0 - 0.4 10e9/L  FrStHsLb    "         Basic metabolic panel [[id number]] (Abnormal)  Resulted: 04/17/18 0741, Result status: Final result    Ordering provider: Lily Segura MD  04/17/18 0000 Resulting lab: [facility]    Specimen Information    Type Source Collected On   Blood  04/17/18 0652          Components       Value Reference Range Flag Lab   Sodium 137 133 - 144 mmol/L  FrStHsLb   Potassium 4.3 3.4 - 5.3 mmol/L  FrStHsLb   Chloride 107 94 - 109 mmol/L  FrStHsLb   Carbon Dioxide 23 20 - 32 mmol/L  FrStHsLb   Anion Gap 7 3 - 14 mmol/L  FrStHsLb   Glucose 99 70 - 99 mg/dL  FrStHsLb   Urea Nitrogen 21 7 - 30 mg/dL  FrStHsLb   Creatinine 1.17 0.66 - 1.25 mg/dL  FrStHsLb   GFR Estimate 60 >60 mL/min/1.7m2 L FrStHsLb   Comment:  Non  GFR Calc   GFR Estimate If Black 73 >60 mL/min/1.7m2  FrStHsLb   Comment:  African American GFR Calc   Calcium 7.9 8.5 - 10.1 mg/dL L FrStHsLb            Basic metabolic panel [[id number]] (Abnormal)  Resulted: 04/16/18 0745, Result status: Final result    Ordering provider: Lily Segura MD  04/16/18 0000 Resulting lab: [facility]    Specimen Information    Type Source Collected On   Blood  04/16/18 0705          Components       Value Reference Range Flag Lab   Sodium 136 133 - 144 mmol/L  FrStHsLb   Potassium 3.9 3.4 - 5.3 mmol/L  FrStHsLb   Chloride 107 94 - 109 mmol/L  FrStHsLb   Carbon Dioxide 22 20 - 32 mmol/L  FrStHsLb   Anion Gap 7 3 - 14 mmol/L  FrStHsLb   Glucose 99 70 - 99 mg/dL  FrStHsLb   Urea Nitrogen 23 7 - 30 mg/dL  FrStHsLb   Creatinine 1.18 0.66 - 1.25 mg/dL  FrStHsLb   GFR Estimate 60 >60 mL/min/1.7m2 L FrStHsLb   Comment:  Non  GFR Calc   GFR Estimate If Black 72 >60 mL/min/1.7m2  FrStHsLb   Comment:  African American GFR Calc   Calcium 7.7 8.5 - 10.1 mg/dL L FrStHsLb            CBC with platelets differential [[id number]] (Abnormal)  Resulted: 04/16/18 0725, Result status: Final result    Ordering provider:  Jason Serna, DO  04/16/18 0000 Resulting lab: [facility]    Specimen Information    Type Source Collected On   Blood  04/16/18 0705          Components       Value Reference Range Flag Lab   WBC 3.1 4.0 - 11.0 10e9/L L FrStHsLb   RBC Count 2.69 4.4 - 5.9 10e12/L L FrStHsLb   Hemoglobin 7.9 13.3 - 17.7 g/dL L FrStHsLb   Hematocrit 23.5 40.0 - 53.0 % L FrStHsLb   MCV 87 78 - 100 fl  FrStHsLb   MCH 29.4 26.5 - 33.0 pg  FrStHsLb   MCHC 33.6 31.5 - 36.5 g/dL  FrStHsLb   RDW 13.8 10.0 - 15.0 %  FrStHsLb   Platelet Count 84 150 - 450 10e9/L L FrStHsLb   Diff Method Automated Method   FrStHsLb   % Neutrophils 41.3 %  FrStHsLb   % Lymphocytes 47.7 %  FrStHsLb   % Monocytes 8.4 %  FrStHsLb   % Eosinophils 2.3 %  FrStHsLb   % Basophils 0.0 %  FrStHsLb   % Immature Granulocytes 0.3 %  FrStHsLb   Nucleated RBCs 0 0 /100  FrStHsLb   Absolute Neutrophil 1.3 1.6 - 8.3 10e9/L L FrStHsLb   Absolute Lymphocytes 1.5 0.8 - 5.3 10e9/L  FrStHsLb   Absolute Monocytes 0.3 0.0 - 1.3 10e9/L  FrStHsLb   Absolute Eosinophils 0.1 0.0 - 0.7 10e9/L  FrStHsLb   Absolute Basophils 0.0 0.0 - 0.2 10e9/L  FrStHsLb   Abs Immature Granulocytes 0.0 0 - 0.4 10e9/L  FrStHsLb   Absolute Nucleated RBC 0.0   FrStHsLb            Glucose by meter [[id number]] (Abnormal)  Resulted: 04/16/18 0246, Result status: Final result    Ordering provider: Jason Serna,   04/16/18 0232 Resulting lab: POINT OF CARE TEST, GLUCOSE    Specimen Information    Type Source Collected On     04/16/18 0232          Components       Value Reference Range Flag Lab   Glucose 108 70 - 99 mg/dL H 170   Comment:  Dr/RN Notified            Basic metabolic panel [[id number]] (Abnormal)  Resulted: 04/15/18 0841, Result status: Final result    Ordering provider: Lily Segura MD  04/15/18 0000 Resulting lab: [facility]    Specimen Information    Type Source Collected On   Blood  04/15/18 0759          Components       Value  Reference Range Flag Lab   Sodium 138 133 - 144 mmol/L  FrStHsLb   Potassium 3.6 3.4 - 5.3 mmol/L  FrStHsLb   Chloride 107 94 - 109 mmol/L  FrStHsLb   Carbon Dioxide 25 20 - 32 mmol/L  FrStHsLb   Anion Gap 6 3 - 14 mmol/L  FrStHsLb   Glucose 94 70 - 99 mg/dL  FrStHsLb   Urea Nitrogen 25 7 - 30 mg/dL  FrStHsLb   Creatinine 1.12 0.66 - 1.25 mg/dL  FrStHsLb   GFR Estimate 63 >60 mL/min/1.7m2  FrStHsLb   Comment:  Non  GFR Calc   GFR Estimate If Black 77 >60 mL/min/1.7m2  FrStHsLb   Comment:  African American GFR Calc   Calcium 7.4 8.5 - 10.1 mg/dL L FrStHsLb            CBC with platelets differential [[id number]] (Abnormal)  Resulted: 04/15/18 0821, Result status: Final result    Ordering provider: Jason Serna,   04/15/18 0000 Resulting lab: [facility]    Specimen Information    Type Source Collected On   Blood  04/15/18 0759          Components       Value Reference Range Flag Lab   WBC 2.9 4.0 - 11.0 10e9/L L FrStHsLb   RBC Count 2.83 4.4 - 5.9 10e12/L L FrStHsLb   Hemoglobin 8.3 13.3 - 17.7 g/dL L FrStHsLb   Hematocrit 24.8 40.0 - 53.0 % L FrStHsLb   MCV 88 78 - 100 fl  FrStHsLb   MCH 29.3 26.5 - 33.0 pg  FrStHsLb   MCHC 33.5 31.5 - 36.5 g/dL  FrStHsLb   RDW 13.5 10.0 - 15.0 %  FrStHsLb   Platelet Count 71 150 - 450 10e9/L L FrStHsLb   Diff Method Automated Method   FrStHsLb   % Neutrophils 41.1 %  FrStHsLb   % Lymphocytes 47.9 %  FrStHsLb   % Monocytes 6.6 %  FrStHsLb   % Eosinophils 4.1 %  FrStHsLb   % Basophils 0.0 %  FrStHsLb   % Immature Granulocytes 0.3 %  FrStHsLb   Nucleated RBCs 0 0 /100  FrStHsLb   Absolute Neutrophil 1.2 1.6 - 8.3 10e9/L L FrStHsLb   Absolute Lymphocytes 1.4 0.8 - 5.3 10e9/L  FrStHsLb   Absolute Monocytes 0.2 0.0 - 1.3 10e9/L  FrStHsLb   Absolute Eosinophils 0.1 0.0 - 0.7 10e9/L  FrStHsLb   Absolute Basophils 0.0 0.0 - 0.2 10e9/L  FrStHsLb   Abs Immature Granulocytes 0.0 0 - 0.4 10e9/L  FrStHsLb   Absolute Nucleated RBC 0.0   FrStHsLb             Glucose by meter [[id number]] (Abnormal)  Resulted: 04/15/18 0214, Result status: Final result    Ordering provider: Jason Serna, DO  04/15/18 0156 Resulting lab: POINT OF CARE TEST, GLUCOSE    Specimen Information    Type Source Collected On     04/15/18 0156          Components       Value Reference Range Flag Lab   Glucose 120 70 - 99 mg/dL H 170   Comment:  /RN Notified            Glucose by meter [[id number]]  Resulted: 04/14/18 2119, Result status: Final result    Ordering provider: Jason Serna, DO  04/14/18 2105 Resulting lab: POINT OF CARE TEST, GLUCOSE    Specimen Information    Type Source Collected On     04/14/18 2105          Components       Value Reference Range Flag Lab   Glucose 94 70 - 99 mg/dL  170            Glucose by meter [[id number]] (Abnormal)  Resulted: 04/14/18 1720, Result status: Final result    Ordering provider: Jason Serna,   04/14/18 1707 Resulting lab: POINT OF CARE TEST, GLUCOSE    Specimen Information    Type Source Collected On     04/14/18 1707          Components       Value Reference Range Flag Lab   Glucose 134 70 - 99 mg/dL H 170            Basic metabolic panel [[id number]] (Abnormal)  Resulted: 04/14/18 0802, Result status: Final result    Ordering provider: Lily Segura MD  04/14/18 0000 Resulting lab: [facility]    Specimen Information    Type Source Collected On   Blood  04/14/18 0730          Components       Value Reference Range Flag Lab   Sodium 137 133 - 144 mmol/L  FrStHsLb   Potassium 3.7 3.4 - 5.3 mmol/L  FrStHsLb   Chloride 107 94 - 109 mmol/L  FrStHsLb   Carbon Dioxide 23 20 - 32 mmol/L  FrStHsLb   Anion Gap 7 3 - 14 mmol/L  FrStHsLb   Glucose 104 70 - 99 mg/dL H FrStHsLb   Urea Nitrogen 23 7 - 30 mg/dL  FrStHsLb   Creatinine 1.08 0.66 - 1.25 mg/dL  FrStHsLb   GFR Estimate 66 >60 mL/min/1.7m2  FrStHsLb   Comment:  Non  GFR Calc   GFR Estimate If Black 80 >60 mL/min/1.7m2   FrStHsLb   Comment:  African American GFR Calc   Calcium 7.3 8.5 - 10.1 mg/dL L FrStHsLb            CBC with platelets differential [[id number]] (Abnormal)  Resulted: 04/14/18 0749, Result status: Final result    Ordering provider: Jason Serna,   04/14/18 0000 Resulting lab: [facility]    Specimen Information    Type Source Collected On   Blood  04/14/18 0730          Components       Value Reference Range Flag Lab   WBC 3.0 4.0 - 11.0 10e9/L L FrStHsLb   RBC Count 2.67 4.4 - 5.9 10e12/L L FrStHsLb   Hemoglobin 7.9 13.3 - 17.7 g/dL L FrStHsLb   Hematocrit 23.6 40.0 - 53.0 % L FrStHsLb   MCV 88 78 - 100 fl  FrStHsLb   MCH 29.6 26.5 - 33.0 pg  FrStHsLb   MCHC 33.5 31.5 - 36.5 g/dL  FrStHsLb   RDW 13.7 10.0 - 15.0 %  FrStHsLb   Platelet Count 64 150 - 450 10e9/L L FrStHsLb   Diff Method Automated Method   FrStHsLb   % Neutrophils 46.1 %  FrStHsLb   % Lymphocytes 44.0 %  FrStHsLb   % Monocytes 5.3 %  FrStHsLb   % Eosinophils 4.0 %  FrStHsLb   % Basophils 0.3 %  FrStHsLb   % Immature Granulocytes 0.3 %  FrStHsLb   Nucleated RBCs 0 0 /100  FrStHsLb   Absolute Neutrophil 1.4 1.6 - 8.3 10e9/L L FrStHsLb   Absolute Lymphocytes 1.3 0.8 - 5.3 10e9/L  FrStHsLb   Absolute Monocytes 0.2 0.0 - 1.3 10e9/L  FrStHsLb   Absolute Eosinophils 0.1 0.0 - 0.7 10e9/L  FrStHsLb   Absolute Basophils 0.0 0.0 - 0.2 10e9/L  FrStHsLb   Abs Immature Granulocytes 0.0 0 - 0.4 10e9/L  FrStHsLb   Absolute Nucleated RBC 0.0   FrStHsLb            Testing Performed By     Lab - Abbreviation Name Director Address Valid Date Range    14 - FrStHsLb [facility] Unknown [address] [place] [address] 05/08/15 1057 - Present    170 - Unknown POINT OF CARE TEST, GLUCOSE Unknown Unknown 10/31/11 1114 - Present            Unresulted Labs (24h ago through future)    Start       Ordered    Unscheduled  Blood gas blood with oxy  CONDITIONAL X 3,   Routine     Comments:  Release order if patient in respiratory  distress and Notify Provider.    04/09/18 2045    Unscheduled  Glucose  CONDITIONAL X 3,   Routine     Comments:  Release order if patient experiencing hyperglycemia or hypoglycemia symptoms and Notify Provider.    04/09/18 2045    Unscheduled  Hemoglobin  CONDITIONAL X 3,   Routine     Comments:  Release order if patient experiencing active bleeding and/or hypotension and Notify Provider.    04/09/18 2045      Encounter-Level Documents:     There are no encounter-level documents.      Order-Level Documents:     There are no order-level documents.

## 2018-04-09 NOTE — IP AVS SNAPSHOT
MRN:[id number]                      After Visit Summary   4/9/2018    Guille Aguilar    MRN: [id number]           Thank you!     Thank you for choosing [facility] for your care. Our goal is always to provide you with excellent care. Hearing back from our patients is one way we can continue to improve our services. Please take a few minutes to complete the written survey that you may receive in the mail after you visit with us. Thank you!        Patient Information     Date Of Birth          1940        Designated Caregiver       Most Recent Value    Caregiver    Will someone help with your care after discharge? yes    Name of designated caregiver Cesilia Han (spouse)    Phone number of caregiver see demographics information sheet     Caregiver address see demographics information sheet       About your hospital stay     You were admitted on:  April 9, 2018 You last received care in the:  [facility] [address] [facility]    You were discharged on:  April 17, 2018        Reason for your hospital stay       Renal failure and deconditioning due to suspected multiple myeloma and possible prostate cancer. You were treated with IV fluids and a urinary catheter was placed with ongoing improvement in your kidney function. A bone marrow biopsy was offered, but declined for the time being. You are discharging to a transitional care facility for rehab                  Who to Call     For medical emergencies, please call 911.  For non-urgent questions about your medical care, please call your primary care provider or clinic, None          Attending Provider     Provider Specialty    Max Jay MD Emergency Medicine    [facility]  [facility]       Primary Care Provider Fax #    Physician No Ref-Primary [phone]      After Care Instructions     Activity - Up with nursing assistance           Advance Diet as Tolerated       Follow this diet upon discharge: Regular diet, Ensure Plus  (Adult); Between Meals            Fall precautions           Maya catheter       To straight gravity drainage. Change catheter every 2 weeks and PRN for leaking or decreased uring output with signs of bladder distention. DO NOT change catheter without a specific MD order IF diagnosis of benign prostatic hypertrophy (BPH), neurogenic bladder, or other urological conditions    Patient is scheduled for outpatient urology follow up on 4/25 at 3:00 p.m. With Dr. Hart located at:    [facility]  Address: [address] [place] [address]  Phone: [phone]            General info for SNF       Length of Stay Estimate: Short Term Care: Estimated # of Days <30  Condition at Discharge: Improving  Level of care:skilled   Rehabilitation Potential: Good  Admission H&P remains valid and up-to-date: Yes  Recent Chemotherapy: N/A  Use Nursing Home Standing Orders: Yes            Mantoux instructions       Give two-step Mantoux (PPD) Per Facility Policy Yes                  Follow-up Appointments     Follow Up and recommended labs and tests       Follow up with transitional care physician.  The following labs/tests are recommended: basic metabolic panel within 1 week.    Follow up with Dr. Tavarez as scheduled    Follow up with [facility] in 1-2 weeks for catheter management  Patient is scheduled for outpatient urology follow up on 4/25 at 3:00 p.m. With Dr. Hart located at:     [facility]   Address: [address] [place] [address]   Phone: [phone]                              Your next 10 appointments already scheduled     Apr 26, 2018  2:00 PM CDT   Return Visit with Tony Tavarez MD   [facility] ([facility])    [facility]  [address]  [facility] [address] [phone]              Additional Services     Occupational Therapy Adult Consult       Evaluate and treat as clinically indicated.    Reason:  Generalized weakness,  "deconditioning            Physical Therapy Adult Consult       Evaluate and treat as clinically indicated.    Reason:  Generalized weakness, deconditioning                  Further instructions from your care team       PLEASE BE SURE PATIENT AND FAMILY KEEP THE FOLLOWING FOLLOW UP APPOINTMENTS AS SCHEDULED    Dr. Hart Urology 4/25 3:00 p.m.    Dr. Tavarez Oncology 4/26 2:00 p.m      Pending Results     No orders found from 4/7/2018 to 4/10/2018.            Statement of Approval     Ordered          04/17/18 1452  I have reviewed and agree with all the recommendations and orders detailed in this document.  EFFECTIVE NOW     Approved and electronically signed by:  Jarad Rodriguez MD           04/16/18 0936  I have reviewed and agree with all the recommendations and orders detailed in this document.  EFFECTIVE NOW     Approved and electronically signed by:  Jarad Rodriguez MD             Admission Information     Date & Time Provider Department Dept. Phone    4/9/2018 Jason Serna,  [place] [address] [facility] [phone]      Your Vitals Were     Blood Pressure Pulse Temperature Respirations Height Weight    131/84 (BP Location: Left arm) 80 97.4  F (36.3  C) (Oral) 16 1.88 m (6' 2\") 76.4 kg (168 lb 6.4 oz)    Pulse Oximetry BMI (Body Mass Index)                97% 21.62 kg/m2          C[employer] Information     [employer] lets you send messages to your doctor, view your test results, renew your prescriptions, schedule appointments and more. To sign up, go to [email or web address] . Click on \"Log in\" on the left side of the screen, which will take you to the Welcome page. Then click on \"Sign up Now\" on the right side of the page.     You will be asked to enter the access code listed below, as well as some personal information. Please follow the directions to create your username and password.     Your access code is: [email or web address]  Expires: 5/24/2018 12:08 PM     Your access code will "  in 90 days. If you need help or a new code, please call your [facility] clinic or [phone].        Care EveryWhere ID     This is your Care EveryWhere ID. This could be used by other organizations to access your [facility] medical records  [id number]        Equal Access to Services     YESSICA BERMEO : Hadii aad ku hadshiagata Soomaali, waaxda luqadaha, qaybta kaalmada adeegyada, geneva amezcuamartinaarchie abdul. So [facility] [phone].    ATENCIÓN: Si habla español, tiene a puri disposición servicios gratuitos de asistencia lingüística. Anabel al [phone].    We comply with applicable federal civil rights laws and [place] laws. We do not discriminate on the basis of race, color, national origin, age, disability, sex, sexual orientation, or gender identity.               Review of your medicines      START taking        Dose / Directions    atorvastatin 10 MG tablet   Commonly known as:  LIPITOR   Used for:  Coronary artery disease involving native coronary artery of native heart without angina pectoris        Dose:  10 mg   Take 1 tablet (10 mg) by mouth daily   Quantity:  30 tablet   Refills:  0       calcium carbonate 500 MG chewable tablet   Commonly known as:  TUMS        Dose:  1 chew tab   Take 1 tablet (500 mg) by mouth 3 times daily as needed for heartburn   Quantity:  30 tablet   Refills:  0       gabapentin 100 MG capsule   Commonly known as:  NEURONTIN   Used for:  Idiopathic peripheral neuropathy        Dose:  200 mg   Take 2 capsules (200 mg) by mouth At Bedtime   Quantity:  20 capsule   Refills:  1       omeprazole 20 MG CR capsule   Commonly known as:  priLOSEC        Dose:  20 mg   Take 1 capsule (20 mg) by mouth 2 times daily   Quantity:  30 capsule   Refills:  0         STOP taking     rosuvastatin 40 MG tablet   Commonly known as:  CRESTOR                Where to get your medicines      Some of these will need a paper prescription and others can be bought over the counter. Ask your  nurse if you have questions.     Bring a paper prescription for each of these medications     gabapentin 100 MG capsule       You don't need a prescription for these medications     atorvastatin 10 MG tablet    calcium carbonate 500 MG chewable tablet    omeprazole 20 MG CR capsule                Protect others around you: Learn how to safely use, store and throw away your medicines at www.disposemymeds.org.             Medication List: This is a list of all your medications and when to take them. Check marks below indicate your daily home schedule. Keep this list as a reference.      Medications           Morning Afternoon Evening Bedtime As Needed    atorvastatin 10 MG tablet   Commonly known as:  LIPITOR   Take 1 tablet (10 mg) by mouth daily   Last time this was given:  10 mg on 4/16/2018  9:34 PM                                calcium carbonate 500 MG chewable tablet   Commonly known as:  TUMS   Take 1 tablet (500 mg) by mouth 3 times daily as needed for heartburn   Last time this was given:  1,000 mg on 4/16/2018  9:37 PM                                gabapentin 100 MG capsule   Commonly known as:  NEURONTIN   Take 2 capsules (200 mg) by mouth At Bedtime   Last time this was given:  200 mg on 4/16/2018  9:34 PM                                omeprazole 20 MG CR capsule   Commonly known as:  priLOSEC   Take 1 capsule (20 mg) by mouth 2 times daily   Last time this was given:  20 mg on 4/17/2018  8:20 AM                                          More Information        Discharge Instructions: Caring for Your Indwelling Urinary Catheter  You have been discharged with an indwelling urinary catheter (also called a Maya catheter). A catheter is a thin, flexible tube. An indwelling urinary catheter has two parts. The first part is a tube that drains urine from your bladder. The second part is a bag or other device that collects the urine.  The most important thing to remember is that you want to prevent infection.  Always wash your hands before handling your catheter bag or tubing.  Draining the bedside bag    Wash your hands with soap and clean, running water or use an alcohol-based hand  that contains at least 60% alcohol.    Hold the drainage tube over a toilet or measuring container.    Unclamp the tube and let the bag drain.    Don t touch the tip of the drainage tube or let it touch the toilet or container.  Cleaning the drainage tube    When the bag is empty, clean the tip of the drainage tube with an alcohol wipe.    Clamp the tube.    Reinsert the tube into the pocket on the drainage bag.  Cleaning your skin and tubing    Clean the skin near the catheter with soap and water.    Wash your genital area from front to back.    Wash the catheter tubing. Always wash the catheter in the direction away from your body.    You will be told when and how to change your bag and tubing.    Don t try to remove the catheter by yourself.    You may shower with the catheter in place.  Emptying a leg bag    Wash your hands.    Remove the stopper on the bag.    Drain the bag into the toilet or a measuring container. Don t let the tip of the drainage tube touch anything, including your fingers.    Clean the tip of the drainage tube with alcohol.    Replace the stopper.  Follow-up care  Make a follow-up appointment as directed by your healthcare provider  When to call your healthcare provider  Call your healthcare provider right away if you have any of the following:    Chills or fever above 100.4 F (38 C)    Leakage around the catheter insertion site    Increased spasms (uncontrollable twitching) in your legs, abdomen, or bladder. Occasional mild spasms are normal.    Burning in the urinary tract, penis, or genital area    Nausea and vomiting    Aching in the lower back    Cloudy or bloody (pink or red) urine, sediment or mucus in the urine, or foul-smelling urine   Date Last Reviewed: 1/1/2017 2000-2017 [employer],  [employer]. [address] [place], [place] [address]. All rights reserved. This information is not intended as a substitute for professional medical care. Always follow your healthcare professional's instructions.                Maya Catheter Care    A Maya catheter is a rubber tube that is placed through the urethra (opening where urine comes out) and into the bladder. This helps drain urine from the bladder. There is a small balloon on the end of the tube that is inflated after insertion. This keeps the catheter from sliding out of the bladder.  A Maya catheter is used to treat urinary retention (unable to pass urine). It is also used when there is incontinence (loss of bladder control).  Home care    Finish taking any prescribed antibiotic even if you are feeling better before then.    It is important to keep bacteria from getting into the collection bag. Do not disconnect the catheter from the collection bag.    Use a leg band to secure the drainage tube, so it does not pull on the catheter. Drain the collection bag when it becomes full using the drain spout at the bottom of the bag.    Do not try to pull or remove your catheter. This will injure your urethra. It must be removed by your healthcare provider or nurse.  Follow-up care  Follow up with your healthcare provider as advised for repeat urine testing and catheter removal or replacement.  When to seek medical advice  Call your healthcare provider right away if any of these occur:    Fever of 100.4 F (38 C) or higher, or as directed by your healthcare provider    Bladder pain or fullness    Abdominal swelling, nausea or vomiting, or back pain    Blood or urine leakage around the catheter    Bloody urine coming from the catheter (if a new symptom)    Catheter falls out    Catheter stops draining for 6 hours    Weakness, dizziness, or fainting  Date Last Reviewed: 10/1/2016    [phone] [employer]. [address] [place] [address]. All  rights reserved. This information is not intended as a substitute for professional medical care. Always follow your healthcare professional's instructions.

## 2018-04-09 NOTE — IP AVS SNAPSHOT
` ` Patient Information     Patient Name Sex Guille Patel N ([id number]) Male 1940       Room Bed    2272 0531-29      Patient Demographics     Address Phone E-mail Address    [address]  [place] [address] [phone] (Home) *Preferred*  none (Mobile) [email or web address]      Patient Ethnicity & Race     Ethnic Group Patient Race    American White      Emergency Contact(s)     Name Relation Home Work Mobile    Cesilia Aguilar Spouse [phone]        Documents on File        Status Date Received Description       Documents for the Patient    Face Sheet  () 08     Insurance Card Received 10/04/16 Medicare     Privacy Notice - [place] Received 11     Face Sheet Received () 09     External Medication Information Consent Accepted () 09     Insurance Card  () 03/03/10     Face Sheet Received () 03/03/10     Consent for Services - Hospital/Clinic Received () 11     Insurance Card Received () 11     External Medication Information Consent Accepted () 11     Consent for Services - Hospital/Clinic Received () 11     HIM DEBBI Authorization  13 Perla    Consent for EHR Access  13 Copied from existing Consent for services - C/HOD collected on 2011    [facility] Specified Other       Consent for Services - Hospital/Clinic Received () 14     External Medication Information Consent Accepted 01/16/15     Privacy Notice - [place] Received 01/16/15     Insurance Card Received () 01/16/15     Patient ID Received () 01/16/15 [place]     Consent for Services - Hospital/Clinic Received () 01/16/15     Consent for Services/Privacy Notice - Hospital/Clinic Received () 16     Insurance Card Received () 16 [employer]    Insurance Card Received 17 [employer] 2017-OLD grp #    Consent for Services/Privacy Notice - Hospital/Clinic  Received () 17     Privacy Notice - [place] Received 17     HIM DEBBI Authorization  () 17 Authorization for batch [employer] 17    Patient ID Received 10/02/17 MNDL 21    Care Everywhere Prospective Auth Received 10/20/17     Insurance Card Received 18 [employer] 2018-not on pt    Consent for Services/Privacy Notice - Hospital/Clinic Received 18     Patient ID  (Deleted)      External Medication Information Consent Patient Refused (Deleted) 14        Documents for the Encounter    CMS IM for Patient Signature Received 18     EMS/Ambulance Record  18 [facility]    Discharge Attachment   INDWELLING URINARY CATHETER, DISCHARGE INSTRUCTIONS (ENGLISH)    Discharge Attachment   ARROYO CATHETER, CARE (ENGLISH)    CMS IM for Patient Signature Received 18   2MM      Admission Information     Attending Provider Admitting Provider Admission Type Admission Date/Time    Jason Serna, Jason Garcia,  Emergency 18  1415    Discharge Date Hospital Service Auth/Cert Status Service Area     Emergency Medicine Incomplete [facility] SERVICES    Unit Room/Bed Admission Status        88 ONCOLOGY 8831/8831-02 Admission (Confirmed)       Admission     Complaint    Acute kidney failure (H)      Hospital Account     Name Acct ID Class Status Primary Coverage    Guille Aguilar [id number] Inpatient Open HUMANA - HUMANA MEDICARE ADVANTAGE            Guarantor Account (for Hospital Account #[id number])     Name Relation to Pt Service Area Active? Acct Type    Guille Aguilar  FCS Yes Personal/Family    Address Phone          [id number]  [place] [address] [phone](H)  [phone](O)              Coverage Information (for Hospital Account #[id number])     F/O Payor/Plan Precert #    HUMANA/HUMANA MEDICARE ADVANTAGE     Subscriber Subscriber #    Guille Aguilar [id number]    Address Phone     [address] [address]  [place] [address]

## 2018-04-09 NOTE — ED PROVIDER NOTES
History     Chief Complaint:  Generalized weakness and diarrhea     HPI   Guille Aguilar is a 77 year old male with complex cardiac history s/p coronary bypass x4 and CKD  who presents with diarrhea and generalized weakness. The patient states he has been experiencing diarrhea for the past 5 weeks, about 4-5 episodes per day. This has been consistent and he has gotten progressively weaker. As he did not improve over the 5 weeks, he decided to call EMS. Here, the patient states he is incontinent of diarrhea, shortness of breath with exertion but this is chronic for the past 8 month, and has lost 50 lbs over the past 6 months. He denies fevers, chills,  nausea, vomiting, bloody stools, abdominal pain, urinary symptoms, or any additional complaints. Of note, he reports that his wife retired 5 weeks ago also.    Allergies:  Dust mites     Medications:    Crestor  Omeprazole   Ciloxan  Nitroglycerin   Aspirin      Past Medical History:    Hypertension  Obesity   CAD  Monoclonal gammopathy of unknown significance  CKD  Hyperlipidemia   Non Q wave MI   Glaucoma  Left ventricular diastolic dysfunction   Neuropathies     Past Surgical History:    Cataract IOL bilateral   Coronary bypass x4  Heart cath     Family History:    History reviewed. No pertinent family history.      Social History:  Smoking status: never smoker  Alcohol use: no   Marital Status:        Review of Systems   Constitutional: Positive for unexpected weight change (50 lbs). Negative for chills and fever.   Respiratory: Positive for shortness of breath (chronic ).    Cardiovascular: Negative for chest pain.   Gastrointestinal: Positive for diarrhea. Negative for abdominal pain, blood in stool, nausea and vomiting.   Neurological: Positive for weakness.   All other systems reviewed and are negative.    Physical Exam   First Vitals   BP Temp Temp src Heart Rate Resp SpO2 Height Weight   04/09/18 1824 - 97.7  F (36.5  C) Oral - - - - -   04/09/18  "1823 - - - 92 13 97 % - -   04/09/18 1815 (!) 116/93 - - - - 97 % - -   04/09/18 1810 100/72 - - 96 15 96 % - -   04/09/18 1800 90/69 - - 93 11 96 % - -   04/09/18 1754 144/84 - - 101 19 96 % - -   04/09/18 1745 - - - 98 14 97 % - -   04/09/18 1715 - - - 108 15 96 % - -   04/09/18 1713 - - - 106 15 96 % - -   04/09/18 1700 102/62 - - 115 10 97 % - -   04/09/18 1645 - - - 110 14 97 % - -   04/09/18 1642 109/65 - - 112 14 97 % - -   04/09/18 1630 - - - 109 16 98 % - -   04/09/18 1615 - - - 102 12 - - -   04/09/18 1600 (!) 152/112 - - 68 12 100 % - -   04/09/18 1545 - - - 74 10 - - -   04/09/18 1530 (!) 138/100 - - 80 27 98 % - -   04/09/18 1515 - - - 71 11 98 % - -   04/09/18 1500 125/78 - - 72 15 98 % - -   04/09/18 1430 (!) 149/97 - - 74 16 98 % - -   04/09/18 1426 - 98.5  F (36.9  C) Oral - - - - -   04/09/18 1402 (!) 171/100 - - 67 16 99 % 1.88 m (6' 2\") 81.2 kg (179 lb)        Physical Exam  GENERAL: Pale, pleasant  HEAD: atraumatic  EYES: pupils reactive, extraocular muscles intact, conjunctivae normal  ENT:  mucus membranes moist  NECK:  trachea midline, normal range of motion  RESPIRATORY: no tachypnea, breath sounds clear to auscultation   CVS: normal S1/S2, no murmurs, intact distal pulses  ABDOMEN: Firm, nontender, nondistention  MUSCULOSKELETAL: no deformities  SKIN: warm and dry, no acute rashes or ulceration  NEURO: GCS 15, cranial nerves intact, alert and oriented x3  PSYCH:  Mood/affect normal    Emergency Department Course   ECG (14:41:33):  Rate 85 bpm. RI interval 184. QRS duration 162. QT/QTc 422/502. P-R-T axes 51 255 46. Sinus rhythm with occasional premature ventricular complexes. Complex right bundle branch block new from 2011. Possible lateral infarct, age undetermined. Abnormal ECG. Interpreted at 1530 by Max Jay MD.     Imaging:  Radiographic findings were communicated with the patient who voiced understanding of the findings.    CT-scan abdomen pelvis w/o contrast:  1. Development " of numerous bone lesions suspicious for metastatic  disease.  2. Again seen is enlargement of the prostate gland. A catheter is seen  within a decompressed bladder.     As read by Radiology.      Laboratory:  CBC: HGB 8.4(L), PLT 82(L), o/w WNL (WBC 4.2)  1410 CMP: sodium 131(L), potassium 7.5(HH), carbon dioxide 17(L), anion GAP 15(H), glucose 103(H),  (H), creatinine 21.00(H), GFR 2(L), albumin 2.2 (L), o/w WNL  1410 Lipase: 177   1410 Troponin: 0.034  1615 INR: 1.44(H)   1615 potassium: 7.0 (HH)   1705 BMP: potassium 6.0(H), carbon dioxide 13(L), anion GAP 22(H), glucose 156(H),  (H), Creatinine 19.3(H), GFR 2(L), o/w WNL   1715 Glucose 164(H)  Clostridium difficile toxin B PCR: pending   Occult blood stool: positive    Interventions:  1554 Albuterol 10 mg nebulization   1559 Sodium bicarbonate 150 mEq IV   1604 Calcium gluconate 2 g IV   1609 Sodium polystyrene 30 g PO   1612 NS 1L IV Bolus   1617 Insulin 8 units IV  1617 Dextrose 50% 25 g IV   1656 Dextrose 10% infusion IV    Emergency Department Course:  Past medical records, nursing notes, and vitals reviewed.  1514: I performed an exam of the patient and obtained history, as documented above.   IV started, labs were drawn, and interventions given as above.     1535: I discussed the case with Dr. Lutz of nephrology regarding the patient.    Findings and plan explained to the Patient who consents to admission.   1549: Discussed the patient with Dr. Serna, who will admit the patient to an [facility] bed for further monitoring, evaluation, and treatment.    The patient was sent for a CT scan while in the emergency department, findings above.   1656: I discussed the case with Dr. Lutz regarding the patient.   1657: I spoke with Dr. Serna in person in the ED.     Impression & Plan    Medical Decision Making:  Guille Aguilar is a 77 year old male presents with weight loss as well as diarrhea and weakness over 5 weeks. Board differential was  considered. Labs came back with acute renal failure, hyperkalemia, and creatinine of 21. Patient given IV fluids and hyperkalemia treatment including Albuterol, Dextrose and insulin combination, bicarbonate, Kayexalate, and calcium gluconate. Patient's EKG does not show any hyperkalemia changes. Patient has Maya catheter inserted with 3L of urine output. I did speak with nephrology, Dr. Lutz, who came in and saw the patient as well as hospital services. Decisions were made regarding dialysis as well as bed placement. Patient looks stable. Looks to be an obstructive process.     Diagnosis:    ICD-10-CM    1. Acute renal failure, unspecified acute renal failure type (H) N17.9 Occult blood stool     Clostridium difficile toxin B PCR     Potassium     Lipase     Troponin I     INR     Basic metabolic panel     Glucose by meter     Glucose by meter   2. Anemia, unspecified type D64.9    3. Diarrhea, unspecified type R19.7    4. Hyperkalemia E87.5      Disposition:  Admitted to [facility] EMERGENCY DEPARTMENT  I, Saeid Castillo, am serving as a scribe at 3:14 PM on 4/9/2018 to document services personally performed by Max Jay MD based on my observations and the provider's statements to me.        Max Jay MD  04/09/18 9292

## 2018-04-09 NOTE — ED NOTES
DATE:  4/9/2018   TIME OF RECEIPT FROM LAB:  9308  LAB TEST:  K  LAB VALUE:  7.5  RESULTS GIVEN WITH READ-BACK TO (PROVIDER):  Max Jay MD  TIME LAB VALUE REPORTED TO PROVIDER:   8910

## 2018-04-09 NOTE — IP AVS SNAPSHOT
` `     [place] [address] ONCOLOGY: [phone]                 INTERAGENCY TRANSFER FORM - NOTES (H&P, Discharge Summary, Consults, Procedures, Therapies)   2018                    Hospital Admission Date: 2018  ARCHIE AGUILAR   : 1940  Sex: Male        Patient PCP Information     Provider PCP Type    Physician No Ref-Primary General         History & Physicals      H&P by Jason Serna DO at 2018  4:04 PM     Author:  Jason Serna DO Service:  Hospitalist Author Type:  Physician    Filed:  2018  9:03 PM Date of Service:  2018  4:04 PM Creation Time:  2018  4:04 PM    Status:  Addendum :  Jason Serna DO (Physician)         [facility]    History and Physical  Hospitalist       Date of Admission:  2018[ES1.1]    Assessment & Plan[ES1.2]   Archie Aguilar is a 77 year old male with pmh of MGUS. Diastolic chf, CAD s/p CABG who presents with diarrhea and weakness found to have acute kidney injury on chronic kidney disease stage III    Acute kidney disease on chronic kidney disease stage III[ES1.1]  Uremia  Bony spinal lesions concerning for mets  MGUS  Acute urinary retention[ES1.3]  Likely[ES1.1] NIKKI[ES1.3] from[ES1.1] urinary obstruction given the 2+ liters that[ES1.4] were relieved with rubio catheter placement[ES1.3]  Per the chart, he was supposed to undergo bone marrow biopsy for potential MM per Dr. Tavarez and ALSO was supposed to see a urologist for workup of his elevated PSA[ES1.4]  Given his multiple bone mets on the CT A/P, will need to rule out spinal cord compression given his history of neuropathy and concern for MM versus prostate cancer  Will image entire spine d/t likelihood of possible multiple lesions  Leg strength currently 5/5. Endorses tingling and numbness ascending over the past few months, some left sided leg numbness[ES1.3]  - check CK  - IVF[ES1.1] as per nephrology[ES1.3], repeat potassium at  2000[ES1.5]  - strict ins and outs  - CT abdomen and pelvis without contrast  - ua, urine sodium, urine creatinine, and urine BUN  - consult nephrology[ES1.1] (placed stat, per ED provider the nephrologist Dr. Lutz is coming in)[ES1.4]  - admit to ICU for close monitoring  -[ES1.1] repeat BMP as per nephrology  - MRI of the C, L, and T spine[ES1.3]    Update  Nephrologist at bedside, no need of ICU[ES1.6] will place in stepdown with telemetry  No plans for dialysis at this time per the discussion with the patient[ES1.5]  Discussed 2000 recheck of k at 4.5 with the nephrologist, advised to reduce rate to 75 cc/hr  Ok to recheck in the AM, now patient has made another 1800 cc out of urine[ES1.7]      Hyperkalemia  EKG shows RBBB that is new, but no peaked t waves  For shifting he received 150 mEq NaHCO3, albuterol, D50 and 10 units of insulin  For treatment he received 30 g of kayexalate, 2 grams of calcium gluconate[ES1.1]  - bmp tonight at 2000[ES1.3], further bmp per nephrology[ES1.8]  - no plan for dialysis, appreciate Dr. Huggins    Diarrhea  - enteric precautions and check c diff    Thrombocytopenia  H/o MGUS  Concern for MM or prostatism  - consult Dr. Tavarez from Oncology  - monitor platelets daily    Hemoccult positive  - no history to suggest bleeding  - cbc daily      DVT Prophylaxis: Low Risk/Ambulatory with no VTE prophylaxis indicated  Code Status:[ES1.3] DNR/DNI[ES1.9]. I verified with his wife grecia at home. Also the nurse Shanae was there to witness his request to avoid invasive measures[ES1.3]    Disposition: Expected discharge in[ES1.1] 3-5[ES1.3] days once[ES1.1] renal failure improved and weakness is better[ES1.3].[ES1.1]    Jason Serna[ES1.2], DO[ES1.1]    Primary Care Physician   Physician No Ref-Primary    Chief Complaint[ES1.2]   Weakness and diarrhea    History obtained from the patient[ES1.3]    History of Present Illness[ES1.2]   Guille MICHAEL Lauren is a 77 year old male who presents  with[ES1.1] 5 weeks of diarrhea and difficulty urinating. He notes unintentional 50 lb weight loss. He notes that he recently saw his cardiologist and had a heart workup that was negative. He reports he saw a hematologist and that he did not want to undergo much workup at that point. He did not get a bone marrow biopsy and did not follow up with a urologist. He states he has been feeling progressively weaker. No chest pain or shortness of breath. He started wearing diapers 2 weeks ago. He notes the weakness is an all over weakness that is not worse one place or another. He notes a severe neuropathy over the past few weeks, with tingling that has involved some of his legs    Discussed with his wife, who state that he has been progressively weaker. He was having yellow loose bowel movements but no blood in the diapers she is changes, and no melena. She work in adult housing and specifically states no melena or black stools/. She confirms his DNR/I status[ES1.3]    Past Medical History[ES1.2]    I have reviewed this patient's medical history and updated it with pertinent information if needed.[ES1.3]   Past Medical History:   Diagnosis Date     CAD (coronary artery disease)     CABG 2011: LIMA to LAD, SVG to OM, SVG Y-graft to posterolateral and PDA, cardiac cath 2011: BMS to OM, cath 2006: medicalmanagment     CKD (chronic kidney disease), stage III      Dental abscess      Essential hypertension, benign      Glaucoma 12/4/2009     High cholesterol 12/4/2009     Hyperlipidaemia      Left ventricular diastolic dysfunction      MGUS (monoclonal gammopathy of unknown significance)      Non Q wave myocardial infarction (H)     2006, 2011     Obesity, unspecified[ES1.10]        Past Surgical History[ES1.2]   I have reviewed this patient's surgical history and updated it with pertinent information if needed.[ES1.3]  Past Surgical History:   Procedure Laterality Date     CATARACT IOL, RT/LT      left     CORONARY ARTERY  BYPASS  2011    CABG 2011: LIMA to LAD, SVG to OM, SVG Y-graft to posterolateral and PDA     HEART CATH, ANGIOPLASTY      2006 OM1, unsuccessful PCI-medical management[ES1.10]       Prior to Admission Medications   Prior to Admission Medications   Prescriptions Last Dose Informant Patient Reported? Taking?   rosuvastatin (CRESTOR) 40 MG tablet   No No   Sig: Take 1 tablet (40 mg) by mouth At Bedtime      Facility-Administered Medications: None     Allergies   Allergies   Allergen Reactions     Dust Mites      No Known Allergies        Social History[ES1.2]   I have reviewed this patient's social history and updated it with pertinent information if needed. Guille Aguilar[ES1.3]  reports that he has never smoked. He has never used smokeless tobacco. He reports that he does not drink alcohol or use illicit drugs.[ES1.10]    Family History[ES1.2]   I have reviewed this patient's family history and updated it with pertinent information if needed.[ES1.3]   Family History   Problem Relation Age of Onset     Other - See Comments Mother 83     old age     Other - See Comments Father      fall[ES1.10]       Review of Systems[ES1.2]   The 10 point Review of Systems is negative other than noted in the HPI or here.[ES1.3]     Physical Exam   Temp: 98.5  F (36.9  C) Temp src: Oral BP: (!) 138/100   Heart Rate: 80 Resp: 27 SpO2: 98 % O2 Device: None (Room air)[ES1.2]    Vital Signs with Ranges[ES1.1]  Temp:  [98.5  F (36.9  C)] 98.5  F (36.9  C)  Heart Rate:  [67-80] 80  Resp:  [11-27] 27  BP: (125-171)/() 138/100  SpO2:  [98 %-99 %] 98 %  179 lbs 0 oz[ES1.2]    Constitutional: Awake, alert, cooperative, no apparent distress.[ES1.1] Tremor after receiving albuterol[ES1.3]  Eyes: Conjunctiva and pupils examined and normal.  HEENT: Moist mucous membranes, normal dentition.  Respiratory: Clear to auscultation bilaterally, no crackles or wheezing.  Cardiovascular: Regular rate and rhythm, normal S1 and S2, and no murmur  noted.  GI: Soft, non-distended, non-tender, normal bowel sounds.  Lymph/Hematologic: No anterior cervical or supraclavicular adenopathy.  Skin: No rashes, no cyanosis, no edema.  Musculoskeletal: No joint swelling, erythema or tenderness.  Neurologic: Cranial nerves 2-12 intact, normal strength and sensation.  Psychiatric: Alert, oriented to person, place and time, no obvious anxiety or depression.[ES1.1]    Data[ES1.2]   Data reviewed today:  I personally reviewed[ES1.1] no images or EKG's todayRBBB that is new compared to previous in 2011[ES1.3].[ES1.1]    CT A/P  CT ABDOMEN AND PELVIS WITHOUT CONTRAST  4/9/2018 5:40 PM     HISTORY: Acute renal failure, diarrhea.      COMPARISON: A CT on 11/14/2017.     TECHNIQUE: Routine transverse CT imaging of the abdomen and pelvis was  performed without contrast. Radiation dose for this scan was reduced  using automated exposure control, adjustment of the mA and/or kV  according to patient size, or iterative reconstruction technique.     FINDINGS: There is mild atelectasis in both lung bases. The liver,  spleen, pancreas, gallbladder, and adrenal glands are normal. There  are cysts in the right kidney. The kidneys are otherwise unremarkable.  A catheter is seen within a decompressed bladder. The prostate gland  is again noted to be markedly enlarged. No enlarged lymph node or  other abnormal mass is demonstrated. No free fluid is seen. No free  intraperitoneal gas is identified. The gastrointestinal tract is  unremarkable. The appendix is not identified. There is no additional  evidence of appendicitis. There is calcification in the vascular  structures. There has been development of innumerable lesions  throughout the osseous structures. These are predominantly sclerotic  with a few demonstrating some internal lucency. This includes a lesion  in the right iliac bone measuring 1.8 cm adjacent to the sacroiliac  joint. In the left iliac bone there are two adjacent lesions or  a  single larger lesion measuring 2.7 cm in total length. Smaller lesions  are seen elsewhere in the pelvis and spine. There are degenerative  changes elsewhere in the spine. No abdominal or pelvic wall pathology  is demonstrated.          IMPRESSION:   1. Development of numerous bone lesions suspicious for metastatic  disease.  2. Again seen is enlargement of the prostate gland. A catheter is seen  within a decompressed bladder.[ES1.3]     Recent Labs  Lab 04/09/18  1410   WBC 4.2   HGB 8.4*   MCV 86   PLT 82*   *   POTASSIUM 7.5*   CHLORIDE 99   CO2 17*   *   CR 21.80*   ANIONGAP 15*   RAKEL 8.9   *   ALBUMIN 2.2*   PROTTOTAL 8.3   BILITOTAL 0.2   ALKPHOS 71   ALT 22   AST 15   LIPASE 177   TROPI 0.034[ES1.2]       Imaging:[ES1.1]  No results found for this or any previous visit (from the past 24 hour(s)).[ES1.2]       Revision History        User Key Date/Time User Provider Type Action    > ES1.7 4/9/2018  9:03 PM Jason Serna, DO Physician Addend     ES1.8 4/9/2018  8:54 PM Jason Serna, DO Physician Addend     ES1.5 4/9/2018  7:56 PM Jason Serna, DO Physician Addend     ES1.6 4/9/2018  7:49 PM Jason Serna, DO Physician Incomplete Revision     ES1.10 4/9/2018  6:48 PM Jason Serna, DO Physician Sign     ES1.3 4/9/2018  6:38 PM Jason Serna, DO Physician      ES1.4 4/9/2018  5:02 PM Jason Serna, DO Physician      ES1.9 4/9/2018  4:53 PM Jason Serna, DO Physician      ES1.2 4/9/2018  4:04 PM Jason Serna, DO Physician      ES1.1 4/9/2018  4:03 PM Jason Serna, DO Physician                      Discharge Summaries      Discharge Summaries by Jarad Rodriguez MD at 4/16/2018  9:28 AM     Author:  Jarad Rodriguez MD Service:  Hospitalist Author Type:  Physician    Filed:  4/17/2018  3:03 PM Date of Service:  4/16/2018  9:28 AM Creation Time:  4/16/2018  9:28 AM     Status:  Signed :  Jarad Damon MD (Physician)         [facility]    Discharge Summary  Hospitalist    Date of Admission:  4/9/2018  Date of Discharge:[JM1.1]  4/17/2018[JM1.2]  Discharging Provider:[JM1.1] Jarad Damon[JM1.3], MD    Discharge Diagnoses[JM1.1]   Principal Problem:    Acute kidney failure[JM1.3] related to bladder outlet obstruction   -- resolved with rubio placement[JM1.2]  Active Problems:    Essential hypertension, benign    Left ventricular diastolic dysfunction    Urinary retention    Gastroesophageal reflux disease without esophagitis[JM1.3]   -- improved with PPI bid[JM1.2]    Multiple myeloma[JM1.3]   -- awaiting Bone Marrow bx for definitive diagnosis per Dr. Tavarez[JM1.2]    Prostate cancer (H)    Bone lesions (Multiple myeloma vs Prostate CA mets)    Peripheral neuropathy[JM1.3]   -- better with neurontin[JM1.2]      History of Present Illness[JM1.3]   77 year old male who presents with 5 weeks of diarrhea and difficulty urinating. He notes unintentional 50 lb weight loss. He notes that he recently saw his cardiologist and had a heart workup that was negative. He reports he saw a hematologist and that he did not want to undergo much workup at that point. He did not get a bone marrow biopsy and did not follow up with a urologist. He states he has been feeling progressively weaker. No chest pain or shortness of breath. He started wearing diapers 2 weeks ago. He notes the weakness is an all over weakness that is not worse one place or another. He notes a severe neuropathy over the past few weeks, with tingling that has involved some of his legs[JM1.1].  Initial creat[JM1.4] 21.80, , and potassium 7.5, sodium 131, albumin 2.2, hgb 8.4 with plt's 82.[JM1.2]     Hospital Course[JM1.3]   Rubio placed, PVR 2000 ml,[JM1.4] seen by urology, PSA >10, seen by Oncology, Dr. Tavarez, SPEP showed a monoclonal spike and his abdominal CT showed multiple  bone lesions suggestive of multiple myeloma, but can not exclude metastatic prostate cancer.  The patient is considering doing a bone marrow bx as an outpt.  His pancytopenia is thought to be related to his multiple myeloma.      Was seen by Dr. Lutz from nephrology, and creat normalized by discharge, and potassium and sodium normal as well once obstruction resolved.      Because of generalized weakness he will discharge to a TCU for PT and OT and follow up with urology in 1-2 weeks for a voiding trial, and follow up with Dr. Tavarez concerning Multiple myeloma and definitive diagnosis.[JM1.2]     Jarad Damon MD  Pager: [phone]  Cell Phone:  [phone][JM1.1]       Significant Results and Procedures[JM1.3]   As above[JM1.1]    Pending Results[JM1.3]   These results will be followed up by Dr. Damon[JM1.1]  Unresulted Labs Ordered in the Past 30 Days of this Admission     No orders found from 2/8/2018 to 4/10/2018.          Code Status[JM1.3]   DNR / DNI[JM1.2]       Primary Care Physician   Physician No Ref-Primary    Physical Exam   Temp: 96.2  F (35.7  C) Temp src: Oral BP: 115/76   Heart Rate: 79 Resp: 16 SpO2: 97 % O2 Device: None (Room air)    Vitals:    04/14/18 0606 04/15/18 0555 04/16/18 0503   Weight: 77.1 kg (170 lb) 76.7 kg (169 lb) 76.4 kg (168 lb 6.4 oz)[JM1.3]     Vital Signs with Ranges[JM1.1]  Temp:  [96.2  F (35.7  C)-97.8  F (36.6  C)] 96.2  F (35.7  C)  Heart Rate:  [70-80] 79  Resp:  [16] 16  BP: (102-124)/(73-81) 115/76  SpO2:  [94 %-100 %] 97 %  I/O last 3 completed shifts:  In: 120 [P.O.:120]  Out: 1575 [Urine:1575][JM1.3]    Exam on discharge:[JM1.1]   Lungs clear  CV regular S1S2  Alert, OX#, but generalized weakness.[JM1.2]     # Discharge Pain Plan:[JM1.1]   - Patient currently has NO PAIN and is not being prescribed pain medications on discharge.[JM1.2]      Discharge Disposition[JM1.3]   Discharged to short-term care facility[JM1.2]  Condition at discharge:[JM1.1]  Fair[JM1.2]    Consultations This Hospital Stay   NEPHROLOGY IP CONSULT  PHYSICAL THERAPY ADULT IP CONSULT  OCCUPATIONAL THERAPY ADULT IP CONSULT  NEPHROLOGY IP CONSULT  HEMATOLOGY & ONCOLOGY IP CONSULT  UROLOGY IP CONSULT  SOCIAL WORK IP CONSULT  PHYSICAL THERAPY ADULT IP CONSULT  OCCUPATIONAL THERAPY ADULT IP CONSULT    Time Spent on this Encounter[JM1.3]   I spent a total of 35 minutes discharging this patient.[JM1.1]     Discharge Orders     Mantoux instructions   Give two-step Mantoux (PPD) Per Facility Policy Yes     General info for SNF   Length of Stay Estimate: Short Term Care: Estimated # of Days <30  Condition at Discharge: Improving  Level of care:skilled   Rehabilitation Potential: Good  Admission H&P remains valid and up-to-date: Yes  Recent Chemotherapy: N/A  Use Nursing Home Standing Orders: Yes     Reason for your hospital stay   Renal failure and deconditioning due to suspected multiple myeloma and possible prostate cancer. You were treated with IV fluids and a urinary catheter was placed with ongoing improvement in your kidney function. A bone marrow biopsy was offered, but declined for the time being. You are discharging to a transitional care facility for rehab     Maya catheter   To straight gravity drainage. Change catheter every 2 weeks and PRN for leaking or decreased uring output with signs of bladder distention. DO NOT change catheter without a specific MD order IF diagnosis of benign prostatic hypertrophy (BPH), neurogenic bladder, or other urological conditions     Follow Up and recommended labs and tests   Follow up with transitional care physician.  The following labs/tests are recommended: basic metabolic panel within 1 week.    Follow up with Dr. Tavarez as scheduled    Follow up with [facility] in 1-2 weeks for catheter management     Activity - Up with nursing assistance     DNR/DNI     Physical Therapy Adult Consult   Evaluate and treat as clinically indicated.    Reason:   Generalized weakness, deconditioning     Occupational Therapy Adult Consult   Evaluate and treat as clinically indicated.    Reason:  Generalized weakness, deconditioning     Fall precautions     Advance Diet as Tolerated   Follow this diet upon discharge: Regular diet, Ensure Plus (Adult); Between Meals       Discharge Medications   Current Discharge Medication List      START taking these medications    Details   omeprazole (PRILOSEC) 20 MG CR capsule Take 1 capsule (20 mg) by mouth every morning  Qty: 30 capsule    Associated Diagnoses: Gastroesophageal reflux disease without esophagitis      gabapentin (NEURONTIN) 100 MG capsule Take 2 capsules (200 mg) by mouth At Bedtime  Qty: 20 capsule, Refills: 1    Associated Diagnoses: Idiopathic peripheral neuropathy      calcium carbonate (TUMS) 500 MG chewable tablet Take 1 tablet (500 mg) by mouth 3 times daily as needed for heartburn  Qty: 30 tablet    Associated Diagnoses: Gastroesophageal reflux disease without esophagitis      atorvastatin (LIPITOR) 10 MG tablet Take 1 tablet (10 mg) by mouth daily  Qty: 30 tablet    Associated Diagnoses: Coronary artery disease involving native coronary artery of native heart without angina pectoris         STOP taking these medications       rosuvastatin (CRESTOR) 40 MG tablet Comments:   Reason for Stopping:             Allergies   Allergies   Allergen Reactions     Dust Mites      No Known Allergies      Data[JM1.3]   Most Recent 3 CBC's:[JM1.2]  Recent Labs   Lab Test  04/17/18   0652  04/16/18   0705  04/15/18   0759   WBC  3.0*  3.1*  2.9*   HGB  7.8*  7.9*  8.3*   MCV  89  87  88   PLT  105*  84*  71*[JM1.5]      Most Recent 3 BMP's:[JM1.2]  Recent Labs   Lab Test  04/17/18   0652  04/16/18   0705  04/15/18   0759   NA  137  136  138   POTASSIUM  4.3  3.9  3.6   CHLORIDE  107  107  107   CO2  23  22  25   BUN  21  23  25   CR  1.17  1.18  1.12   ANIONGAP  7  7  6   RAKEL  7.9*  7.7*  7.4*   GLC  99  99  94[JM1.5]     [facility]  Recent 2 LFT's:[JM1.2]  Recent Labs   Lab Test  04/12/18   0540  04/09/18   1410   AST  39  15   ALT  43  22   ALKPHOS  59  71   BILITOTAL  0.3  0.2[JM1.5]     Most Recent INR's and Anticoagulation Dosing History:  Anticoagulation Dose History     Recent Dosing and Labs Latest Ref Rng & Units 11/29/2006 2/17/2011 3/25/2011 3/28/2011 3/28/2011 4/9/2018 4/12/2018    INR 0.86 - 1.14 1.09 1.19(H) 1.09 1.51(H) 1.32(H) 1.44(H) 1.35(H)        Most Recent 3 Troponin's:[JM1.2]  Recent Labs   Lab Test  04/12/18   1430  04/12/18   1007  04/12/18   0540   TROPI  0.075*  0.075*  0.087*[JM1.5]     Most Recent Cholesterol Panel:[JM1.2]  Recent Labs   Lab Test  04/11/18   0631   CHOL  96   LDL  46   HDL  36*   TRIG  71[JM1.5]     Most Recent 6 Bacteria Isolates From Any Culture (See EPIC Reports for Culture Details):[JM1.2]No lab results found.[JM1.5]  Most Recent TSH, T4 and A1c Labs:[JM1.2]  Recent Labs   Lab Test  04/09/18   2125   10/02/17   1132   TSH   --    --   1.56   A1C  5.4   < >   --     < > = values in this interval not displayed.[JM1.5]         Revision History        User Key Date/Time User Provider Type Action    > JM1.5 4/17/2018  3:03 PM Jarad Rodriguez MD Physician Sign     JM1.2 4/17/2018  2:52 PM Jarad Rodriguez MD Physician      JM1.3 4/16/2018  9:41 AM Jarad Rodriguez MD Physician      JM1.4 4/16/2018  9:40 AM Jarad Rodriguez MD Physician      JM1.1 4/16/2018  9:28 AM Jarad Rodriguez MD Physician                      Consult Notes      Consults by Francine Valdivia PA-C at 4/11/2018  1:41 PM     Author:  Francine Valdivia PA-C Service:  Urology Author Type:  Physician Assistant - C    Filed:  4/11/2018  1:43 PM Date of Service:  4/11/2018  1:41 PM Creation Time:  4/11/2018  1:21 PM    Status:  Attested :  Francine Valdivia PA-C (Physician Assistant - C)    Cosigner:  Delicia Nowak MD at 4/16/2018 10:49 AM         Consult Orders:    1.  Urology IP Consult: urinary retention, suspected prostate cancer; Consultant may enter orders: Yes; Patient to be seen: Routine - within 24 hours; Requested Clinic/Group: [facility] [[id number]] ordered by Lily Segura MD at 04/11/18 0640           Attestation signed by Delicia Nowak MD at 4/16/2018 10:49 AM        Physician Attestation   I, Delicia Nowak, have reviewed and discussed with the advanced practice provider their history, physical and plan for Guille Aguilar. I did not participate in a shared visit by interviewing or examining the patient and this should be billed as an advanced practice provider only visit.    Delicia Nowak  Date of Service (when I saw the patient): I did not personally see this patient today.                               [facility]    Urology Consultation     Date of Admission:  4/9/2018    Assessment & Plan   Guille Aguilar is a 77 year old male who was admitted on 4/9/2018. I was asked to see the patient for urinary retention and concern for CaP with PSA >10 and bony lesions on CT.    Plan: Ideally would continue with rubio x 1-2 weeks to allow bladder healing given >2L was drained with placement.   Will need a repeat PSA as outpatient, cystoscopy for further hematuria evaluation, and possible prostate bx.   Follow up in urology office in 1-2 weeks to further address the above.     Francine Valdivia PA-C  [facility]  [address] [place] [id number]  [phone]  [email or web address]  Text Page (7am to 5pm)    Code Status    DNR/DNI    Reason for Consult   Reason for consult: I was asked by Dr. Segura to evaluate this patient for urinary retention and elevated PSA.    Primary Care Physician   Physician No Ref-Primary    Chief Complaint   Urinary retention, elevated PSA     History is obtained from the patient and wife    History of Present Illness   Guille Aguilar is a 77 year old  male who was admitted with difficulty urinating and diarrhea x 5 weeks.       From admission H&P: Guille Aguilar is a 77 year old male who presents with 5 weeks of diarrhea and difficulty urinating. He notes unintentional 50 lb weight loss. He notes that he recently saw his cardiologist and had a heart workup that was negative. He reports he saw a hematologist and that he did not want to undergo much workup at that point. He did not get a bone marrow biopsy and did not follow up with a urologist. He states he has been feeling progressively weaker. No chest pain or shortness of breath. He started wearing diapers 2 weeks ago. He notes the weakness is an all over weakness that is not worse one place or another. He notes a severe neuropathy over the past few weeks, with tingling that has involved some of his legs  Discussed with his wife, who state that he has been progressively weaker. He was having yellow loose bowel movements but no blood in the diapers she is changes, and no melena. She work in adult housing and specifically states no melena or black stools/. She confirms his DNR/I status.     Urology was consulted given his urinary retention, elevated PSA and bony lesions concerning for possible metastasis vs. Multiple myeloma. Oncology following and recommending bone bx. As stated above, pts PSA has been noted to be >10, he was referred to urology but apparently never followed through with this. Prostate is large on CT but cannot confirm cancer without a bx or MRI which would be obtained as an outpatient.      On admission, rubio catheter was placed with >2L of urine return with hematuria. Creat[MG1.1] 21[MG1.2] on admission, now down to 2.99 today. Patient notes incontinence at home but it is unclear if he had noticed any hematuria or decreased stream as he is confused and collectively a poor historian. Currently denies abdominal or suprapubic tenderness, CVA tenderness, or irritation with rubio. Hgb stable at  7.7.     Past Medical History   I have reviewed this patient's medical history and updated it with pertinent information if needed.[MG1.1]   Past Medical History:   Diagnosis Date     CAD (coronary artery disease)     CABG 2011: LIMA to LAD, SVG to OM, SVG Y-graft to posterolateral and PDA, cardiac cath 2011: BMS to OM, cath 2006: medicalmanagment     CKD (chronic kidney disease), stage III      Dental abscess      Essential hypertension, benign      Glaucoma 12/4/2009     High cholesterol 12/4/2009     Hyperlipidaemia      Left ventricular diastolic dysfunction      MGUS (monoclonal gammopathy of unknown significance)      Non Q wave myocardial infarction (H)     2006, 2011     Obesity, unspecified[MG1.3]        Past Surgical History   I have reviewed this patient's surgical history and updated it with pertinent information if needed.[MG1.1]  Past Surgical History:   Procedure Laterality Date     CATARACT IOL, RT/LT      left     CORONARY ARTERY BYPASS  2011    CABG 2011: LIMA to LAD, SVG to OM, SVG Y-graft to posterolateral and PDA     HEART CATH, ANGIOPLASTY      2006 OM1, unsuccessful PCI-medical management[MG1.4]       Prior to Admission Medications   Prior to Admission Medications   Prescriptions Last Dose Informant Patient Reported? Taking?   rosuvastatin (CRESTOR) 40 MG tablet 4/9/2018 at am Self No Yes   Sig: Take 1 tablet (40 mg) by mouth At Bedtime      Facility-Administered Medications: None     Allergies   Allergies   Allergen Reactions     Dust Mites      No Known Allergies        Social History   I have reviewed this patient's social history and updated it with pertinent information if needed. Guille Aguilar[MG1.1]  reports that he has never smoked. He has never used smokeless tobacco. He reports that he does not drink alcohol or use illicit drugs.[MG1.4]    Family History   I have reviewed this patient's family history and updated it with pertinent information if needed.[MG1.1]   Family History    Problem Relation Age of Onset     Other - See Comments Mother 83     old age     Other - See Comments Father      fall[MG1.4]       Review of Systems   The 10 point Review of Systems is negative other than noted in the HPI or here. Difficult to obtain an accurate ROS as patient is confused and seems to be a poor historian.     Physical Exam   Temp: 97.9  F (36.6  C) Temp src: Axillary BP: (!) 143/98 Pulse: 76 Heart Rate: 81 Resp: 18 SpO2: 96 % O2 Device: None (Room air)    Vital Signs with Ranges  Temp:  [97.9  F (36.6  C)-98.8  F (37.1  C)] 97.9  F (36.6  C)  Pulse:  [69-81] 76  Heart Rate:  [78-81] 81  Resp:  [15-18] 18  BP: ()/(61-98) 143/98  SpO2:  [93 %-96 %] 96 %  162 lbs .61 oz    Constitutional: Sitting up in bed, NAD. Notable confusion. Wife at bedside.   Eyes: no icterus  ENT: normocephalic, atraumatic   Respiratory: breathing unlabored   Cardiovascular: chest wall symmetric   GI: soft, NT, ND. No CVAT   Genitourinary: rubio in place and draining blood tinged urine.   Skin: well perfused   Neuropsychiatric: alert, confused     Data[MG1.1]   Results for orders placed or performed during the hospital encounter of 04/09/18 (from the past 24 hour(s))   Glucose   Result Value Ref Range    Glucose 126 (H) 70 - 99 mg/dL   Glucose   Result Value Ref Range    Glucose 164 (H) 70 - 99 mg/dL   Glucose   Result Value Ref Range    Glucose 170 (H) 70 - 99 mg/dL   Glucose by meter   Result Value Ref Range    Glucose 172 (H) 70 - 99 mg/dL   Glucose by meter   Result Value Ref Range    Glucose 189 (H) 70 - 99 mg/dL   CBC with platelets differential   Result Value Ref Range    WBC 2.8 (L) 4.0 - 11.0 10e9/L    RBC Count 2.58 (L) 4.4 - 5.9 10e12/L    Hemoglobin 7.7 (L) 13.3 - 17.7 g/dL    Hematocrit 22.8 (L) 40.0 - 53.0 %    MCV 88 78 - 100 fl    MCH 29.8 26.5 - 33.0 pg    MCHC 33.8 31.5 - 36.5 g/dL    RDW 14.3 10.0 - 15.0 %    Platelet Count 63 (L) 150 - 450 10e9/L    Diff Method Automated Method     % Neutrophils  59.6 %    % Lymphocytes 31.6 %    % Monocytes 5.5 %    % Eosinophils 2.9 %    % Basophils 0.0 %    % Immature Granulocytes 0.4 %    Nucleated RBCs 0 0 /100    Absolute Neutrophil 1.6 1.6 - 8.3 10e9/L    Absolute Lymphocytes 0.9 0.8 - 5.3 10e9/L    Absolute Monocytes 0.2 0.0 - 1.3 10e9/L    Absolute Eosinophils 0.1 0.0 - 0.7 10e9/L    Absolute Basophils 0.0 0.0 - 0.2 10e9/L    Abs Immature Granulocytes 0.0 0 - 0.4 10e9/L    Absolute Nucleated RBC 0.0    Lipid panel reflex to direct LDL   Result Value Ref Range    Cholesterol 96 <200 mg/dL    Triglycerides 71 <150 mg/dL    HDL Cholesterol 36 (L) >39 mg/dL    LDL Cholesterol Calculated 46 <100 mg/dL    Non HDL Cholesterol 60 <130 mg/dL   Basic metabolic panel   Result Value Ref Range    Sodium 141 133 - 144 mmol/L    Potassium 3.1 (L) 3.4 - 5.3 mmol/L    Chloride 110 (H) 94 - 109 mmol/L    Carbon Dioxide 24 20 - 32 mmol/L    Anion Gap 7 3 - 14 mmol/L    Glucose 148 (H) 70 - 99 mg/dL    Urea Nitrogen 54 (H) 7 - 30 mg/dL    Creatinine 2.99 (H) 0.66 - 1.25 mg/dL    GFR Estimate 20 (L) >60 mL/min/1.7m2    GFR Estimate If Black 25 (L) >60 mL/min/1.7m2    Calcium 8.0 (L) 8.5 - 10.1 mg/dL   Ferritin   Result Value Ref Range    Ferritin 245 26 - 388 ng/mL   Iron and iron binding capacity   Result Value Ref Range    Iron 34 (L) 35 - 180 ug/dL    Iron Binding Cap 157 (L) 240 - 430 ug/dL    Iron Saturation Index 22 15 - 46 %   Vitamin B12   Result Value Ref Range    Vitamin B12 606 193 - 986 pg/mL   Folate   Result Value Ref Range    Folate 10.9 >5.4 ng/mL   Protein electrophoresis   Result Value Ref Range    Albumin Fraction PENDING 3.7 - 5.1 g/dL    Alpha 1 Fraction PENDING 0.2 - 0.4 g/dL    Alpha 2 Fraction PENDING 0.5 - 0.9 g/dL    Beta Fraction PENDING 0.6 - 1.0 g/dL    Gamma Fraction PENDING 0.7 - 1.6 g/dL    Monoclonal Peak PENDING 0.0 g/dL    ELP Interpretation: PENDING    Magnesium   Result Value Ref Range    Magnesium 1.7 1.6 - 2.3 mg/dL   Phosphorus   Result Value Ref  Range    Phosphorus 3.7 2.5 - 4.5 mg/dL   Glucose   Result Value Ref Range    Glucose 134 (H) 70 - 99 mg/dL[MG1.5]                Revision History        User Key Date/Time User Provider Type Action    > MG1.2 4/11/2018  1:43 PM Francine Valdivia PA-C Physician Assistant - GURINDER Sign     MG1.5 4/11/2018  1:41 PM Francine Valdivia PA-C Physician Assistant - GURINDER Sign     MG1.4 4/11/2018  1:39 PM Francine Valdivia PA-C Physician Assistant - GURINDER      MG1.3 4/11/2018  1:38 PM Francine Valdivia PA-C Physician Assistant - C      MG1.1 4/11/2018  1:21 PM Francine Valdivia PA-C Physician Assistant - C             Consults signed by Tony Tavarez MD at 4/13/2018 10:08 AM      Author:  Tony Tavarez MD Service:  Hem/Onc Author Type:  Physician    Filed:  4/13/2018 10:08 AM Date of Service:  4/12/2018  2:04 PM Creation Time:  4/12/2018  3:09 PM    Status:  Signed :  Tony Tavarez MD (Physician)         PROGRESS NOTE: 04/12/2018      SUBJECTIVE:    Mr. Aguilar is a 77-year-old gentleman with monoclonal gammopathy.  I have previously seen him in the clinic in 11/2017.  Investigation including bone marrow biopsy and skeletal survey were scheduled.  Patient had cancelled it .  Patient now has been admitted to the hospital because of diarrhea and weakness.  Labs on admission revealed highly elevated creatinine mainly from dehydration.  It has been improving.  He is also cytopenic.      I had met with the patient and wife.  Bone marrow biopsy was recommended to establish a diagnosis of myeloma.  Patient wanted to think about it.      I met with the patient today. Patient says that he is feeling slightly better.  He is getting stronger.      Patient mentioned that at this time he has decided to wait on the bone marrow.  He wants to get stronger before he wants to have bone marrow biopsy.  He does understand that his myeloma will continue to get worse without any treatment, but he wants to wait until he is  stronger.      LABORATORY:  Reviewed.      ASSESSMENT:   1.  A 77-year-old gentleman with monoclonal gammopathy.  Most likely he has multiple myeloma.   2.  Pancytopenia, which can be explained from multiple myeloma.   3.  Renal failure, which is improving with hydration.   4.  Enlarged prostate and elevated PSA suspicious for prostate cancer.      PLAN:   1.  As per patient's wishes, no bone marrow biopsy will be done during this hospitalization.  Patient will be going to rehab.  We will plan on seeing him back in the clinic in about 2 weeks' time to discuss regarding further investigation including bone marrow biopsy.  Patient's MRI reveals infiltrative marrow disorder which would go along with malignancy.   2.  Patient is pancytopenic.  We will recheck a CBC when he comes to the clinic for followup.   3.  Patient had a few questions, which were all answered.  We will sign off.  We will see him in clinic in 2 weeks.         ELIZABETH NEVILLE MD             D: 2018   T: 2018   MT: NTS      Name:     ARCHIE AGUILAR   MRN:      [id number]        Account:       [id number]   :      1940           Consult Date:  2018      Document: [id number][BK1.1]         Revision History        User Key Date/Time User Provider Type Action    > BK1.1 2018 10:08 AM Elizabeth Neville MD Physician Sign     [N/A] 2018  3:09 PM Elizabeth Neville MD Physician Edit            Consults signed by Elizabeth Neville MD at 2018  1:51 PM      Author:  Elizabeth Neville MD Service:  Hem/Onc Author Type:  Physician    Filed:  2018  1:51 PM Date of Service:  2018  2:02 PM Creation Time:  2018  2:29 PM    Status:  Signed :  Elizabeth Neville MD (Physician)         Progress Note:  2018      SUBJECTIVE:    Mr. Agiular is a 77-year-old gentleman with monoclonal gammopathy. Labs are highly suspicious for multiple myeloma.      I had seen him yesterday.  I discussed with him regarding further  workup including bone marrow biopsy. Patient wanted to think about it.      I met with the patient.  His wife was present. I reviewed the labs.  I explained to them the labs are highly suspicious for multiple myeloma.      Discussed regarding multiple myeloma. To establish a diagnosis we need a bone marrow biopsy.  I  discussed regarding prognosis and treatment options briefly.  Details will be discussed once diagnosis is established.      Patient and wife had multiple questions, which were all answered.  Patient mentioned he wants time to think about bone marrow biopsy.  He has hesitation regarding bone marrow biopsy and treatment for myeloma.      Patient's PSA is elevated.  Scans reveal an enlarged prostate.  He may have prostate cancer also.  Bone lesions seen on the scans are likely from myeloma and not prostate cancer.      Overall, the patient says his condition is slightly better. He feels little stronger.  His labs revealed improvement on creatinine.  Pancytopenia is stable.      PHYSICAL EXAMINATION:   GENERAL:  He was alert and oriented x 3.   VITAL SIGNS:  Reviewed.     Rest of the system not examined.      LABORATORY DATA:  Reviewed.      ASSESSMENT:   1.  A 77-year-old gentleman with monoclonal gammopathy.  Most likely has multiple myeloma.   2.  Pancytopenia.  This can be explained from myeloma.   3.  Acute renal failure, improving.   4.  Elevated PSA with enlarged prostate suspicious for prostate cancer.      PLAN:   1.  Will wait for patient's decision regarding bone marrow biopsy.  Hopefully he agrees for bone marrow biopsy.  That will help us establish the diagnosis.  Without establishing a diagnosis, we will not be able to give him any treatment.   2. Patient has pancytopenia.  This is likely from myeloma.  Will monitor CBC and transfuse as needed to keep hemoglobin above 7.0 and platelets above 10,000.   3.  Discussed regarding elevated PSA.  He could have prostate cancer.  I told them at this  time I would focus on diagnosis and treatment for myeloma then prostate cancer.     4.  We will continue to follow him.  Discussed with Dr. Segura.      Total time spent 35 minutes, more than 50% of the time spent in counseling and coordination of care.         ELIZABETH TAVAREZ MD             D: 2018   T: 2018   MT: EBONI      Name:     ARCHIE AGUILAR   MRN:      [id number]        Account:       [id number]   :      1940           Consult Date:  2018      Document: [id number][BK1.1]         Revision History        User Key Date/Time User Provider Type Action    > BK1.1 2018  1:51 PM Elizabeth Tavarez MD Physician Sign     [N/A] 2018  2:29 PM Elizabeth Tavarez MD Physician Edit            Consults signed by Elizabeth Tavarez MD at 2018  1:52 PM      Author:  Elizabeth Tavarez MD Service:  Hem/Onc Author Type:  Physician    Filed:  2018  1:52 PM Date of Service:  4/10/2018  3:11 PM Creation Time:  4/10/2018  4:06 PM    Status:  Signed :  Elizabeth Tavarez MD (Physician)     Consult Orders:    1. Hematology & Oncology IP Consult: likely MM. Sees. Dr. Tavarez; Consultant may enter orders: Yes; Patient to be seen: Routine - within 24 hours; Requested Clinic/Group: [facility] [[id number]] ordered by Jason Serna DO at 18 1828                Consult Date:  04/10/2018      REQUESTING PHYSICIAN:    This consult has been requested by Dr. Serna for monoclonal gammopathy.      HISTORY OF PRESENT ILLNESS:    Mr. Aguilar is a 77-year-old gentleman with monoclonal gammopathy.  I had seen him in the clinic on 11/15/2017.  Multiple labs had been done on 11/10/2017.  SPEP had revealed an M-spike of 4.0.  Immunofixation revealed IgG lambda.  IgG level was 5110.  Urine immunofixation also revealed IgG lambda and free lambda chain.  Urine electrophoresis revealed M-spike of 21.        Labs were suspicious for multiple myeloma.  I discussed with him regarding  further workup.  Bone marrow biopsy and skeletal survey was ordered. Patient cancelled it and did not follow up in the clinic.      Patient was brought to the Emergency Room on 04/09/2018 because of diarrhea and weakness. He has been weak for last few months, but it got worse in the last 1-2 month.  In the last 4-5 weeks, he also started to have diarrhea.  Appetite has been decreased.  He has been losing weight.  With these complaints, he was brought to the ER on 04/09/2018 and had multiple investigations done on 04/09/2018.   -- WBC of 4.2, hemoglobin of 8.4 and platelets of 82.  MCV of 86.   -- Creatinine of 21.8.   -- Potassium of 7.5.     -- Calcium of 8.9.      Patient is getting IV hydration.  His creatinine has improved to 10.1.  His potassium has normalized.      REVIEW OF SYSTEMS:    Denies any headache.  Some dizziness.  No chest pain.  No shortness of breath.  No nausea or vomiting.  Appetite has been decreased. Patient has some urinary retention from enlarged prostate. Patient has a Maya catheter.  As per the nurse, his urine volume has been increasing.  It is blood tinged urine, which I think is traumatic.  His diarrhea is improving.      Patient denies any recent infection.  No upper respiratory infection in the last few weeks.  No fevers or chills.      All other review of systems negative.      ALLERGIES:  REVIEWED.      MEDICATIONS:  Reviewed.      PAST MEDICAL HISTORY:   1.  Coronary artery disease, status post stent placement.   2.  Hyperlipidemia.   3.  Hypertension.   4.  Numbness in both feet.   5.  Glaucoma.   6.  Monoclonal gammopathy.      PAST SURGICAL HISTORY:   1.  Cataract surgery.   2.  Cardiac bypass surgery.      SOCIAL HISTORY:    -No smoking.  -Occasional alcohol use.      PHYSICAL EXAMINATION:   GENERAL:  He was alert.  He looked weak.   VITALS:  Reviewed.   Rest of the systems not examined.      LABORATORY DATA:  Reviewed.   -CT scan and MRI reviewed.     ASSESSMENT:   1.  A  77-year-old gentleman with monoclonal gammopathy (IgG lambda).   2.  Pancytopenia.   3.  Acute renal failure.   4.  Elevated PSA and enlarged prostate suspicious for prostate cancer.      RECOMMENDATIONS:   1.  I discussed with the patient regarding his abnormal labs.  I explained to the patient it that I am highly suspicious that he has multiple myeloma.     His M-spike and IgG level has been highly elevated.  Patient had CT abdomen and pelvis yesterday which reveals multiple bone lesions.  Probably they are myeloma related, but could be from prostate cancer also. Patient had MRI of cervical, thoracic and lumbar spine done today.  It reveals marrow infiltrative disorder which would go more along with myeloma instead of prostate cancer. Patient has pancytopenia.  This will go along with multiple myeloma causing bone marrow replacement.     2.  Discussed regarding further workup. To establish a diagnosis, we need a bone marrow biopsy. Patient wants to think about it and wants to discuss with his wife.  When agreeable, we will plan on doing a bone marrow biopsy.     3.  Once diagnosis of myeloma is established, will start him on treatment.  As he has kidney involvement, it is important that treatment be started soon with Velcade based regimen.     4. Patient had a few questions, which were all answered. Discussed with Dr. Lutz from Nephrology and Dr. Segura.      Thanks for the consult.      Total time spent 60 minutes, more than 50% of time spent in counseling and coordination of care.         ELIZABETH TAVAREZ MD             D: 04/10/2018   T: 04/10/2018   MT: CADE      Name:     ARCHIE ALDANA   MRN:      [id number]        Account:       [id number]   :      1940           Consult Date:  04/10/2018      Document: [id number]       cc: Jason Serna MD[BK1.1]        Revision History        User Key Date/Time User Provider Type Action    > BK1.1 2018  1:52 PM Elizabeth Tavarez MD Physician Sign     [N/A]  4/10/2018  4:06 PM Tony Tavarez MD Physician Edit            Consults by Gregorio Lutz MD at 4/9/2018  5:33 PM     Author:  Gregorio Lutz MD Service:  Nephrology Author Type:  Physician    Filed:  4/10/2018  3:02 PM Date of Service:  4/9/2018  5:33 PM Creation Time:  4/9/2018  5:33 PM    Status:  Signed :  Gregorio Lutz MD (Physician)     Consult Orders:    1. Nephrology IP Consult: Patient to be seen: STAT - within 1 hour; Call back #: ED; renal failure; Consultant may enter orders: Yes [[id number]] ordered by Max Jay MD at 04/09/18 1726                [facility]    RENAL CONSULTATION NOTE    REFERRING MD:[RS1.1]  Dr. Jay (ER)[RS1.2]    REASON FOR CONSULTATION:[RS1.1]  NIKKI c severe hyperkalemia[RS1.2]    DATE OF CONSULTATION: 04/09/18    SHORTHAND KEY FOR MY NOTES:  c = with, s = without, p = after, a = before, x = except, asx = asymptomatic, tx = transplant or treatment, sx = symptoms or symptomatic, cx = canceled or culture, rxn = reaction, yday = yesterday, nl = normal, abx = antibiotics, fxn = function, dx = diagnosis, dz = disease, m/h = melena/hematochezia, c/d/l/ha = cramping/dizziness/lightheadedness/headache, d/c = discharge or diarrhea/constipation, f/c/n/v = fevers/chills/nausea/vomiting, cp/sob = chest pain/shortness of breath.    HPI: Guille Aguilar is a 77 year old male c[RS1.1] CKD III 2 ?[RS1.2] who was admitted on 4/9/2018[RS1.1] c c/o diarrhea x 5 wks and found to have oliguric NIKKI c severe hyperkalemia, met acidosis.      Pt noted that he's been having 4-5 episodes of diarrhea daily for the past 5 wks.[RS1.2]  He didn't have any bloody stools.[RS1.3]  In addition, he states that his uo decreased around the same time.  He had prostatic sx[RS1.2] prior to this hospitalization.  The diarrhea was bad enough that he started to wear adult diapers a couple of wks ago.  Since then, he has been progressively more weak.  Eventually, EMS was activated.    In the ER, pt  was pretty weak and labs showed multiple abnormalities.  His k was quite high and he has been treated c med mgmt.     His abd was firm and a rubio was placed c 2.6L of urine out right away.  He denied any f/c/n/v.  He is hungry and his upper extremity strength has been ok.  No cp/sob.    Reviewing records, the pt was seeing Dr. Tavarez for possible MM and didn't complete the w/u.  His cr was 1.4 last month.[RS1.3]    ROS:  A complete review of systems was performed and is x as noted above.    PMH:    Past Medical History:   Diagnosis Date     CAD (coronary artery disease)     CABG 2011: LIMA to LAD, SVG to OM, SVG Y-graft to posterolateral and PDA, cardiac cath 2011: BMS to OM, cath 2006: medicalmanagment     CKD (chronic kidney disease), stage III      Dental abscess      Essential hypertension, benign      Glaucoma 12/4/2009     High cholesterol 12/4/2009     Hyperlipidaemia      Left ventricular diastolic dysfunction      MGUS (monoclonal gammopathy of unknown significance)      Non Q wave myocardial infarction (H)     2006, 2011     Obesity, unspecified      PSH:    Past Surgical History:   Procedure Laterality Date     CATARACT IOL, RT/LT      left     CORONARY ARTERY BYPASS  2011    CABG 2011: LIMA to LAD, SVG to OM, SVG Y-graft to posterolateral and PDA     HEART CATH, ANGIOPLASTY      2006 OM1, unsuccessful PCI-medical management     MEDICATIONS:      ALLERGIES:    Allergies as of 04/09/2018 - Nicola as Reviewed 04/09/2018   Allergen Reaction Noted     Dust mites  01/29/2009     No known allergies  07/14/2004     FH:    Family History   Problem Relation Age of Onset     Other - See Comments Mother 83     old age     Other - See Comments Father      fall     SH:    Social History     Social History     Marital status:      Spouse name: N/A     Number of children: N/A     Years of education: N/A     Occupational History     Not on file.     Social History Main Topics     Smoking status: Never Smoker      "Smokeless tobacco: Never Used     Alcohol use No     Drug use: No     Sexual activity: Not Currently     Partners: Female     Other Topics Concern     Caffeine Concern Yes     2 cups daily of coffee     Sleep Concern No     Stress Concern No     Weight Concern No     Special Diet No     Exercise Yes     walking in the mall     Parent/Sibling W/ Cabg, Mi Or Angioplasty Before 65f 55m? Yes     Social History Narrative     PHYSICAL EXAM:    /62  Temp 98.5  F (36.9  C) (Oral)  Resp 15  Ht 1.88 m (6' 2\")  Wt 81.2 kg (179 lb)  SpO2 96%  BMI 22.98 kg/m2    GENERAL: awake, alert, NAD  HEENT:  Normocephalic. No gross abnormalities.[RS1.1]  Dry mouth[RS1.2].  Dentition is ok.  Pupils equal.  EOMI.  No scleral icterus.  CV: R[RS1.1]eg, tachy, nl S1/S2[RS1.2],[RS1.1] no significant ble[RS1.2] edema.  RESP: Clear bilaterally with good efforts  GI: Abdomen o/s/nt/nd, BS present.  MUSCULOSKELETAL: extremities nl - no gross deformities noted  SKIN: no suspicious lesions or rashes, dry to touch  NEURO:  Strength normal and symmetric.[RS1.1] Very twitchy.  + asterixis.[RS1.2]  PSYCH: mood good, affect appropriate  LYMPH: No palpable ant/post cervical and supraclavicular adenopathy[RS1.1]  OTHER:  + rubio c red urine[RS1.2]    LABS:      CBC RESULTS:     Recent Labs  Lab 04/09/18  1410   WBC 4.2   RBC 2.84*   HGB 8.4*   HCT 24.5*   PLT 82*     BMP RESULTS:    Recent Labs  Lab 04/09/18  1615 04/09/18  1410   NA  --  131*   POTASSIUM 7.0* 7.5*   CHLORIDE  --  99   CO2  --  17*   BUN  --  187*   CR  --  21.80*   GLC  --  103*   RAKEL  --  8.9     INR  Recent Labs  Lab 04/09/18  1615   INR 1.44*      DIAGNOSTICS:  Personally reviewed abd CT -[RS1.1] prelim report - large prostate, multiple lucencies[RS1.2]    A/P:  Guille Aguilar is a 77 year old male c CKD III 2 ? who has significant NIKKI 2 obst, hyperkalemia, met acidosis.      1.  NIKKI/CKD c severe hyperkalemia.  Pt had a cr of 1.4 last month and he presented c a cr of 21.8.  " He is a bit twitchy, but doesn't have many other uremic sx.  He is dry and is receiving IVF.  His k is up due to the acidosis.  Pt had a significant obstruction and had 2.6L of urine immediately p rubio placement.   The etiology of the obst is prob the prostate.  His CKD is potentially from MM.  Pt is not interested in long-term HD and prob won't need it now.  If he does, though, he will likely consent to short-term HD.    A.  Follow labs, uo, sx.  B.  Continue IVF[RS1.1], but change to include bicarb.[RS1.2]  C.[RS1.1]  Avoid nephrotoxics.  D.  Check labs again at 2000 and adjust plan prn.  E.  Med mgmt of high k.    2.  Probable MM.  Pt had a w/u c Dr. Tavarez in Nov 2017.  His kappa/lambda ratio was low and his SPEP/UPEP/immunofix studies were abn.  Now, he has multiple lucencies on CT.  He has neuropathy, too, which is prob related.  A.  Onc consult c Dr. Tavarez tmrw.    3.  Anemia.  Related to #2, primarily.  A.  Follow labs, clinically.    4.  Hypotension.  Pt's BP is on the low side, likely from the diarrhea.  A.  Continue IVF.  B.  Follow clinically.    5.  Code Status.  Pt is DNR/DNI.      Thank you for this consultation. We will follow c you.  Please call if any questions.[RS1.2]    Attestation:   I have reviewed today's relevant vital signs, notes, medications, labs and imaging.    Gregorio Lutz MD  [facility] - Nephrology  [phone][RS1.1]     Revision History        User Key Date/Time User Provider Type Action    > RS1.3 4/10/2018  3:02 PM Gregorio Lutz MD Physician Sign     RS1.2 4/9/2018  6:10 PM Gregorio Lutz MD Physician      RS1.1 4/9/2018  5:34 PM Gregorio Lutz MD Physician             Consults by Huong Snider, ZENAIDA ASKEW at 4/10/2018 12:20 PM     Author:  Huong Snider RD, ZENAIDA Service:  Nutrition Author Type:  Registered Dietitian    Filed:  4/10/2018 12:20 PM Date of Service:  4/10/2018 12:20 PM Creation Time:  4/10/2018 12:11 PM    Status:  Signed :  Huong Snider RD, LD  "(Registered Dietitian)         CLINICAL NUTRITION SERVICES  -  ASSESSMENT NOTE      RECOMMENDATIONS FOR MD/PROVIDER TO ORDER:   Consider appetite stimulant     Recommendations Ordered by Registered Dietitian (RD):   Ensure BID between meals     Malnutrition:   % Weight Loss:  > 20% in 1 year (severe malnutrition)  % Intake:  <75% for >/= 3 months (non-severe malnutrition)  Subcutaneous Fat Loss:  None observed  Muscle Loss:  None observed  Fluid Retention:  None noted    Malnutrition Diagnosis: Non-Severe malnutrition  In Context of:  Chronic illness or disease          REASON FOR ASSESSMENT  Guille Aguilar is a 77 year old male seen by Registered Dietitian for Admission Nutrition Risk Screen - Unintentional weight loss of 10# or more in past 2 months      NUTRITION HISTORY  - Information obtained from the pt, however he was very sleepy so hx was limited.  He states his appetite has been poor on and off for the past 5 months, see wt loss below.  - Supplements - pt was taking Ensure once/day.      CURRENT NUTRITION ORDERS  Diet Order:     2000 mg Sodium     Current Intake/Tolerance:  Poor appetite, he ate 25% of an egg scramble and couldn't eat any more. He's will to take Ensure between meals.      PHYSICAL FINDINGS  Observed  No nutrition-related physical findings observed  Obtained from Chart/Interdisciplinary Team  None noted    ANTHROPOMETRICS  Height:[CM1.1] 6' 2\"[CM1.2]  Weight:[CM1.1] 168 lbs 10.43 oz[CM1.2] (76.5 kg)[CM1.1]  Body mass index is 21.65 kg/(m^2).[CM1.2]  Weight Status:  Normal BMI  IBW: 86.4 kg +/- 10%  % IBW: 88%  Weight History: Per the records below pt has lost a total of 51# (23%) in the past year; 24# (13%) was just in the past 6 months.[CM1.1]  Wt Readings from Last 12 Encounters:   04/10/18 76.5 kg (168 lb 10.4 oz)   02/23/18 80.1 kg (176 lb 8 oz)   11/20/17 87.5 kg (193 lb)   11/15/17 87.5 kg (193 lb)   11/13/17 87.2 kg (192 lb 4.8 oz)   11/10/17 89.4 kg (197 lb)   10/20/17 89.4 kg (197 " lb)   10/17/17 88.9 kg (196 lb)   10/02/17 87.5 kg (193 lb)   05/30/17 94.3 kg (208 lb)   02/27/17 99.8 kg (220 lb)   11/08/16 103.4 kg (228 lb)[CM1.3]       LABS  Labs reviewed    MEDICATIONS  Medications reviewed      ASSESSED NUTRITION NEEDS PER APPROVED PRACTICE GUIDELINES:    Dosing Weight 76.5 kg (4/10)  Estimated Energy Needs: 8598-9006 kcals (30-35 Kcal/Kg)  Justification: underweight  Estimated Protein Needs: 75-90 grams protein (1-1.2 g pro/Kg)  Justification: Repletion and CKD      MALNUTRITION:  % Weight Loss:  > 20% in 1 year (severe malnutrition)  % Intake:  <75% for >/= 3 months (non-severe malnutrition)  Subcutaneous Fat Loss:  None observed  Muscle Loss:  None observed  Fluid Retention:  None noted    Malnutrition Diagnosis: Non-Severe malnutrition  In Context of:  Chronic illness or disease    NUTRITION DIAGNOSIS:  Inadequate oral intake related to decreased appetite as evidenced by 23% wt loss x 1 yr      NUTRITION INTERVENTIONS  Recommendations / Nutrition Prescription  Continue current diet  Nutritional supplements  Consider appetite stimulant      Implementation  Nutrition education: Per Provider order if indicated   Medical Food Supplement - Ensure between meals      Nutrition Goals  Pt will consume at least 50% of meals and supplements.      MONITORING AND EVALUATION:  Progress towards goals will be monitored and evaluated per protocol and Practice Guidelines      Huong Snider RD  Pager [phone] (M-F)            [phone] (W/E & Hol)[CM1.1]                     Revision History        User Key Date/Time User Provider Type Action    > CM1.3 4/10/2018 12:20 PM Huong Snider RD, LD Registered Dietitian Sign     CM1.2 4/10/2018 12:12 PM Huong Snider RD, ZENAIDA Registered Dietitian      CM1.1 4/10/2018 12:11 PM Huong Snider, JAMILAH, LD Registered Dietitian                      Progress Notes - Physician (Notes from 04/14/18 through 04/17/18)      Progress Notes by Lyndsay Bell LSW at  4/17/2018 12:34 PM     Author:  Lyndsay Bell LSW Service:  (none) Author Type:      Filed:  4/17/2018  3:59 PM Date of Service:  4/17/2018 12:34 PM Creation Time:  4/17/2018 12:34 PM    Status:  Addendum :  Lyndsay Bell LSW ()         STAN  I: STAN faxed over updated PT notes for patient. SW awaits insurance auth from [facility].[SM1.1]    ADDENDUM  I:[SM1.2]STAN called and checked status of insurance authorization. Danielle has stated she has not heard back from [facility] at this point. SW awaits a confirmation from Danielle at [facility].[SM1.3]     ADDENDUM  I: STAN called and left message with Danielle [phone], [facility] Liaison, checking for update on insurance auth. SW left message and awaits a c/b.[SM1.2]     P: STAN will continue to follow and assist as needed.[SM1.1]    ADDENDUM  I; STAN received update that [facility] received insurance auth. STAN faxed orders. STAN arranged w/c ride for 1700. STAN updated Danielle. STAN called spouse and updated her. Spouse was ok with plan.[SM1.4]     LUIS Olivas   *[phone][SM1.1]       Revision History        User Key Date/Time User Provider Type Action    > SM1.4 4/17/2018  3:59 PM Lyndsay Bell LSW  Addend     SM1.2 4/17/2018  3:32 PM Lyndsay Bell LSW  Addend     SM1.3 4/17/2018  3:03 PM Lyndsay Bell LSW  Addend     SM1.1 4/17/2018 12:34 PM Lyndsay Bell LSW  Sign            Progress Notes by Katelyn Morrow RD, LD at 4/17/2018  2:45 PM     Author:  Katelyn Morrow RD, ZENAIDA Service:  Nutrition Author Type:  Registered Dietitian    Filed:  4/17/2018  2:51 PM Date of Service:  4/17/2018  2:45 PM Creation Time:  4/17/2018  2:45 PM    Status:  Signed :  Katelyn Morrow RD, LD (Registered Dietitian)         CLINICAL NUTRITION SERVICES - REASSESSMENT NOTE      Malnutrition: Non-Severe malnutrition  In Context of:  Chronic illness or disease (4/10)        EVALUATION OF PROGRESS TOWARD GOALS   Diet: Regular diet + Berry Breeze at 10am and Berry Magic Cup at 2pm     Intake:    -Pt report that appetite is ok, overall pt feels a little weak from prolonged hospital stay   -Pt reports consuming Boost regularly   -Per RN flowhseet, pt consuming 25%-75% of meals   -Wt fairly stable this admission      Previous Goals:   Pt will continue to consume at least 75% of meals  Evaluation: met     Previous Nutrition Diagnosis:   No nutrition diagnosis identified at this time  Evaluation: No change      CURRENT NUTRITION DIAGNOSIS  No nutrition diagnosis identified at this time     INTERVENTIONS  Recommendations / Nutrition Prescription  Continue regular diet as ordered  Continue oral nutrition supplements BID     Implementation  Nutrition education: discussed increasing protein for energy, protein sources and oral nutrition supplements. Provided coupons for home for oral nutrition supplements     Goals  Pt to consume at least 75% of meals ordered TID and oral nutrition supplements       MONITORING AND EVALUATION:  Progress towards goals will be monitored and evaluated per protocol and Practice Guidelines      Katelyn Morrow RD, LD[AP1.1]     Revision History        User Key Date/Time User Provider Type Action    > AP1.1 4/17/2018  2:51 PM Katelyn Morrow RD, ZENAIDA Registered Dietitian Sign            Progress Notes by Zully Mehta PT at 4/17/2018 11:51 AM     Author:  Zully Mehta PT Service:  Physical Medicine and Rehabilitation Author Type:  Physical Therapist    Filed:  4/17/2018 11:51 AM Date of Service:  4/17/2018 11:51 AM Creation Time:  4/17/2018 11:51 AM    Status:  Signed :  Zully Mehta PT (Physical Therapist)          04/17/18 1100   Quick Adds   Type of Visit Initial PT Evaluation   Living Environment   Lives With spouse   Living Arrangements house   Home Accessibility stairs to enter home;stairs within home;tub/shower is not walk in   Number of  Stairs to Enter Home 3   Number of Stairs Within Home 14   Stair Railings at Home inside, present at both sides;outside, present on right side   Transportation Available car;family or friend will provide   Self-Care   Dominant Hand right   Usual Activity Tolerance moderate   Current Activity Tolerance fair   Equipment Currently Used at Home none   Functional Level Prior   Ambulation 0-->independent   Transferring 0-->independent   Toileting 0-->independent   Bathing 0-->independent   Dressing 0-->independent   Eating 0-->independent   Communication 0-->understands/communicates without difficulty   Swallowing 0-->swallows foods/liquids without difficulty   Cognition 0 - no cognition issues reported   Fall history within last six months yes   Number of times patient has fallen within last six months 2   General Information   Onset of Illness/Injury or Date of Surgery - Date 04/09/18   Referring Physician Jarad Bunch   Patient/Family Goals Statement planning on TCU   Pertinent History of Current Problem (include personal factors and/or comorbidities that impact the POC) Guille Aguilar is a 77 year old male with hx of CAD s/p CABG, dCHF, CKD, suspected multiple myeloma, and non-compliance despite strong recommendations from various providers who presented on 4/9/2018 with diarrhea and generalized weakness, and was admitted for acute renal failure and new bone lesions concerning for progressive multiple myeloma. He also likely has prostate cancer.    Precautions/Limitations fall precautions   Weight-Bearing Status - LUE full weight-bearing   Weight-Bearing Status - RUE full weight-bearing   Weight-Bearing Status - LLE full weight-bearing   Weight-Bearing Status - RLE full weight-bearing   Cognitive Status Examination   Orientation orientation to person, place and time   Level of Consciousness lethargic/somnolent   Follows Commands and Answers Questions 100% of the time;able to follow single-step instructions  "  Personal Safety and Judgment impaired;at risk behaviors demonstrated  (not positioning safely prior to sit)   Pain Assessment   Patient Currently in Pain No   Posture    Posture Kyphosis;Protracted shoulders;Forward head position   Range of Motion (ROM)   ROM Comment limited trunk ROM with postureal deformities   Strength   Strength Comments decreased strength throughout, B LE with significant decreased mm endurance, able to sit tostand with min A   Bed Mobility   Bed Mobility Comments bed mob with min A and A for set up   Transfer Skills   Transfer Comments sit to stand with FWW and cues for hand placement and safetay   Gait   Gait Comments amb with min A and FWW, very fatigued, very small steps wiht min clearance, 10'   Balance   Balance Comments impaired standing dynamic balance requires B UE support, likely due to LE and trunk weakness   Muscle Tone   Muscle Tone no deficits were identified   General Therapy Interventions   Planned Therapy Interventions gait training;bed mobility training;strengthening;transfer training;ROM   Clinical Impression   Criteria for Skilled Therapeutic Intervention yes, treatment indicated   PT Diagnosis difficulty transferring and amb   Influenced by the following impairments significant decrease in strength and act marshal, extreme fatigue, low Bp's, decreased balance, decreased ROM with postural deficits   Functional limitations due to impairments impaired functional mob I and safety and marshal   Clinical Presentation Evolving/Changing   Clinical Presentation Rationale 3-5 deficits   Clinical Decision Making (Complexity) Moderate complexity   Therapy Frequency` daily   Predicted Duration of Therapy Intervention (days/wks) 3 days   Anticipated Discharge Disposition Transitional Care Facility   Risk & Benefits of therapy have been explained Yes   Patient, Family & other staff in agreement with plan of care Yes   [facility] AM-PAC TM \"6 Clicks\"   2016, Trustees of [facility], " "under license to [employer].  All rights reserved.   6 Clicks Short Forms Basic Mobility Inpatient Short Form   [facility] AM-PAC  \"6 Clicks\" V.2 Basic Mobility Inpatient Short Form   1. Turning from your back to your side while in a flat bed without using bedrails? 3 - A Little   2. Moving from lying on your back to sitting on the side of a flat bed without using bedrails? 3 - A Little   3. Moving to and from a bed to a chair (including a wheelchair)? 3 - A Little   4. Standing up from a chair using your arms (e.g., wheelchair, or bedside chair)? 3 - A Little   5. To walk in hospital room? 3 - A Little   6. Climbing 3-5 steps with a railing? 2 - A Lot   Basic Mobility Raw Score (Score out of 24.Lower scores equate to lower levels of function) 17   Total Evaluation Time   Total Evaluation Time (Minutes) 13[SF1.1]        Revision History        User Key Date/Time User Provider Type Action    > SF1.1 4/17/2018 11:51 AM Zully Mehta, PT Physical Therapist Sign            Progress Notes by Lyndsay Bell LSW at 4/16/2018 11:07 AM     Author:  Lyndsay Bell LSW Service:  (none) Author Type:      Filed:  4/16/2018  4:39 PM Date of Service:  4/16/2018 11:07 AM Creation Time:  4/16/2018 11:07 AM    Status:  Addendum :  Lyndsay Bell LSW ()         STAN  I: STAN spoke with patient and spouse to discuss d/c plan. Patient's spouse stated she received a call from [facility] stating that they recieved [employer] Auth. Patient and spouse is agreeable to having patient d/c to facility. Patient would like a w/c ride arranged and is ok with fees associated with transport. STNA called [facility] and left message asking for bed confirmation. STAN arranged ride for[SM1.1] 1915.[SM1.2]    PAS-RR    D: Per DHS regulation, STAN completed and submitted PAS-RR to [place] [employer] on Aging Direct Connect via the [employer].  PAS-RR confirmation # is : [id number]    I: STAN spoke with pt " and they are aware a PAS-RR has been submitted.  STAN reviewed with pt that they may be contacted for a follow up appointment within 10 days of hospital discharge if their SNF stay is < 30 days.  Contact information for [employer] was also provided.    A: pt verbalized understanding.    P: Further questions may be directed to [employer] Line at #[phone], option #4 for PAS-RR staff.[SM1.1]  Spouse had requested SW to look into other [facility] options. SW called [facility]. They are full and not accepting admissions. SW called spoke with [facility]. They do not have any beds available.[SM1.3]SW spoke with patient and spouse to discuss this. STAN and MD discussed the possibility of Home Care. Patient's spouse cannot take patient home.[SM1.4]     -SW faxed orders anticipating patient will d/c to [facility].[SM1.5]   -STAN sent referral to [facility] per spouse's request. SW awaits confirmation.[SM1.6]     ADDENDUM  I: STAN called [employer] with patient and spouse to attempt to get prior auth for patient to d/c to [facility].[SM1.7] [employer] is working on insurance auth. Patient's spouse refuses to send patient to [facility]. STAN will plan for patient to d/c to [facility] tomorrow after facility receives insurance auth. RN will need to text page MD and update.[SM1.8]      Revision History        User Key Date/Time User Provider Type Action    > SM1.8 4/16/2018  4:39 PM Lyndsay Bell LSW  Addend     SM1.7 4/16/2018  4:35 PM Lyndsay Bell LSW  Incomplete Revision     SM1.6 4/16/2018  2:07 PM Lyndsay Bell LSW  Addend     SM1.5 4/16/2018 12:15 PM Lyndsay Bell LSW  Addend     SM1.4 4/16/2018 12:14 PM Lyndsay Bell LSW  Addend     SM1.3 4/16/2018 11:38 AM Marik-Marcano, Lyndsay, LSW  Addend     [N/A] 4/16/2018 11:17 AM Lyndsay Bell LSW  Addend     SM1.2 4/16/2018 11:13 AM Lyndsay Bell LSW   Sign     SM1.1 4/16/2018 11:07 AM Lyndsay Bell LSW              Progress Notes by Shailesh Nunes at 4/16/2018 11:23 AM     Author:  Shailesh Nunes Service:  Spiritual Health Author Type:      Filed:  4/16/2018 11:30 AM Date of Service:  4/16/2018 11:23 AM Creation Time:  4/16/2018 11:23 AM    Status:  Signed :  Shailesh Nunes ()         SPIRITUAL HEALTH SERVICES Progress Note  [facility]    SH visited pt and his spouse on f/u. Pt is set to d/c but pt and spouse expressed concern about the facility. 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 SH provided reflective nonjudgmental listening and support affirming the couple's ministry and impact and trust in the Lord. SH had read pt's booklet of his life and times in [place] as a young man and thanked pt for his[LW1.1] story[LW1.2] and[LW1.1] testimony[LW1.2]. Pt and spouse welcomed prayer. SH informed SW of pt's concern. SW will f/u. No plans to follow pending d/c.    Shailesh Nunes M.Div.  Chaplain Resident  [phone] Pager[LW1.1]     Revision History        User Key Date/Time User Provider Type Action    > LW1.2 4/16/2018 11:30 AM Shailesh Nunes Sign     LW1.1 4/16/2018 11:23 AM Shailesh Nunes             Progress Notes by Yudi Heath RN at 4/16/2018 10:58 AM     Author:  Yudi Heath RN Service:  Care Coordinator Author Type:      Filed:  4/16/2018 11:00 AM Date of Service:  4/16/2018 10:58 AM Creation Time:  4/16/2018 10:58 AM    Status:  Signed :  Yudi Heath RN ()         Per update patient is medically/financially approved for TCU stay with [employer].  Scheduled patient for Urology follow up with DR. Hart 4/25 15:00 [place] location in [facility] and will be sent to TCU for continued updates.     Patient also scheduled for Dr. Tavarez 4/26 in [place].  Hand off submitted to Dr. Wagoner[BB1.1]     Revision History        User Key Date/Time User Provider Type Action    > BB1.1 4/16/2018 11:00 AM Yudi Heath, MARCIA Case Manager Sign            Progress Notes by Jarad Damon MD at 4/15/2018 10:05 AM     Author:  Jarad Damon MD Service:  Hospitalist Author Type:  Physician    Filed:  4/15/2018  2:45 PM Date of Service:  4/15/2018 10:05 AM Creation Time:  4/15/2018 10:05 AM    Status:  Signed :  Jarad Damon MD (Physician)         [facility]  Hospitalist Progress Note  Jarad Damon MD  04/15/2018    Assessment & Plan   Guille Aguilar is a 77 year old male with hx of CAD s/p CABG, dCHF, CKD, suspected multiple myeloma, and non-compliance despite strong recommendations from various providers who presented on 4/9/2018 with diarrhea and generalized weakness, and was admitted for acute renal failure and new bone lesions concerning for progressive multiple myeloma. He also likely has prostate cancer.      Acute renal failure on CKD stage III due to obstructive nephropathy/urinary retention, Improving  He was noted to have an elevated PSA >10 in Nov 2017, and was referred to Urology, but did not follow up. Presented on this admission with diarrhea, progressive generalized weakness, and unintentional weight loss >50 lb in last 6 months. Creatinine 21.8 on arrival from baseline 1.4 range. Likely multifactorial from diarrhea, multiple myeloma, and obstruction from urinary retention. Rubio catheter was placed in the ED with return of >2L of urine. Nephrology has been involved in his care. His renal function has continued to improve with IV fluids and Rubio catheter placement. .  - Cr 1.16, 1.08 today, will follow  - off IV fluids, drinking ok  - Rubio catheter in place. Follow up with Urology in 1-2 weeks for further rubio management     Intermittent  hypotension  RRT 4/12 for hypotension, likely due to profound autodiuresis amidst improving renal failure. Patient was noted to have been net neg 9L since admission despite IV fluids. BP improved with more aggressive fluid resuscitation.   - bp stable     Bone lesions due to multiple myeloma vs metastatic disease  Possible prostate cancer                          Most recently saw Dr. Tavarez at  Oncology in Nov 2017, but was lost to follow up. It was felt at that time that the patient probably had myeloma with anemia, thrombocytopenia, renal insufficiency, and monoclonal gammopathy (IgG lambda) seen on SPEP/UPEP and immunofixation. Further work-up including bone marrow biopsy and skeletal survey was recommended, but patient did not follow up. CT abd/pelvis on arrival (obtained for diarrhea) incidentally showed numerous bone lesions as well as an enlarged prostate. As noted above, his PSA was previously elevated to >10, but patient has not followed up. MRI c/t/l spine (4/10) showed decreased T1 signal concerning for metastatic disease and acute to subacute compression fracture at T11-T12. Dr. Tavarez has discussed these findings with the patient, and has recommended pursuing diagnosis/treatment of MM first and then prostate cancer.  - Dr. Tavarez to arrange outpatient follow up with  Oncology  - Follow up with Urology in 1-2 weeks    Pancytopenia due to suspected multiple myeloma, Stable[JM1.1], related to MM[JM1.2]     CAD, native heart, native vessels s/p 3-v CABG  [PTA: rosuvastatin 40 mg qhs] Tchol 96, HDL 36, LDL, 46 during admission  - Rosuvastatin was discontinued in favor of atorvastatin 10 mg daily  - Not on BB due to history of orthostatic hypotension. Not on aspirin due to history of melena     Elevated troponin due to demand ischemia     GERD  Started on ranitidine during his hospital stay for severe heartburn.   - Continues on ranitidine 150 mg BID, TUMS prn     Non-severe malnutrition in context of  "chronic illness  On Ensure supplements BID as per Nutrition     # Pain Assessment:  Current Pain Score 4/13/2018   Patient currently in pain? denies   Pain score (0-10) -   Pain location -   Pain descriptors -   Guille palacios pain level was assessed and he currently denies pain.       FEN: Regular diet  DVT Prophylaxis: Pneumatic Compression Devices  Code Status: DNR/DNI     Disposition:   - To TCU on  4/16 (pending insurance approval)[JM1.1].  Wife updated.[JM1.2]        # Pain Assessment:  Current Pain Score 4/15/2018   Patient currently in pain? denies   Pain score (0-10) -   Pain location -   Pain descriptors -[JM1.1]      Jarad Linaressaeid[JM1.3]   Pager: [phone]  Cell Phone:  [phone]        Interval History[JM1.1]   No new complaints, agreeable to TCU.[JM1.2]     -Data reviewed today: I reviewed all new labs and imaging over the last 24 hours. I personally reviewed no images or EKG's today.    Physical Exam   Heart Rate: 84, Blood pressure 96/58, pulse 90, temperature 96.2  F (35.7  C), temperature source Oral, resp. rate 16, height 1.88 m (6' 2\"), weight 76.7 kg (169 lb), SpO2 95 %.  Vitals:    04/13/18 0602 04/14/18 0606 04/15/18 0555   Weight: 72.8 kg (160 lb 6.4 oz) 77.1 kg (170 lb) 76.7 kg (169 lb)     Vital Signs with Ranges  Temp:  [96  F (35.6  C)-96.8  F (36  C)] 96.2  F (35.7  C)  Heart Rate:  [76-84] 84  Resp:  [16] 16  BP: ()/(58-86) 96/58  SpO2:  [95 %-97 %] 95 %  I/O's Last 24 hours  I/O last 3 completed shifts:  In: -   Out: 2550 [Urine:2550]    Constitutional: Awake, alert, cooperative, no apparent distress  Respiratory: Clear to auscultation bilaterally, no crackles or wheezing  Cardiovascular: Regular rate and rhythm, normal S1 and S2, and no murmur noted  GI: Normal bowel sounds, soft, non-distended, non-tender  Skin/Integumen: No rashes, no cyanosis, no edema  Other:      Medications   All medications were reviewed.      ranitidine  150 mg Oral BID     sodium chloride 0.9%  " 500 mL Intravenous Once     atorvastatin  10 mg Oral Daily     sodium chloride (PF)  3 mL Intracatheter Q8H        Data     Recent Labs  Lab 04/15/18  0759 04/14/18  0730 04/13/18  0545  04/12/18  1430 04/12/18  1007 04/12/18  0540  04/09/18  1615 04/09/18  1410   WBC 2.9* 3.0* 3.5*  --   --   --  3.2*  < >  --  4.2   HGB 8.3* 7.9* 7.5*  --   --   --  8.2*  < >  --  8.4*   MCV 88 88 88  --   --   --  88  < >  --  86   PLT 71* 64* 57*  --   --   --  56*  < >  --  82*   INR  --   --   --   --   --   --  1.35*  --  1.44*  --     137 137  --   --   --  137  < >  --  131*   POTASSIUM 3.6 3.7 3.6  --   --   --  3.5  < > 7.0* 7.5*   CHLORIDE 107 107 109  --   --   --  107  < >  --  99   CO2 25 23 20  --   --   --  23  < >  --  17*   BUN 25 23 22  --   --   --  30  < >  --  187*   CR 1.12 1.08 1.16  --   --   --  1.45*  < >  --  21.80*   ANIONGAP 6 7 8  --   --   --  7  < >  --  15*   RAKEL 7.4* 7.3* 7.3*  --   --   --  7.6*  < >  --  8.9   GLC 94 104* 97  < > 117* 125* 119*  < >  --  103*   ALBUMIN  --   --   --   --   --   --  1.6*  --   --  2.2*   PROTTOTAL  --   --   --   --   --   --  7.2  --   --  8.3   BILITOTAL  --   --   --   --   --   --  0.3  --   --  0.2   ALKPHOS  --   --   --   --   --   --  59  --   --  71   ALT  --   --   --   --   --   --  43  --   --  22   AST  --   --   --   --   --   --  39  --   --  15   LIPASE  --   --   --   --   --   --  487*  --   --  177   TROPI  --   --   --   --  0.075* 0.075* 0.087*  --   --  0.034   < > = values in this interval not displayed.    No results found for this or any previous visit (from the past 24 hour(s)).[JM1.1]            Revision History        User Key Date/Time User Provider Type Action    > JM1.2 4/15/2018  2:45 PM Jarad Rodriguez MD Physician Sign     JM1.3 4/15/2018 10:06 AM Jarad Rodriguez MD Physician      JM1.1 4/15/2018 10:05 AM Jarad Rodriguez MD Physician             Progress Notes by Tolu Gonzalez MD at  4/14/2018  4:28 PM     Author:  Tolu Gonzalez MD Service:  Hospitalist Author Type:  Physician    Filed:  4/14/2018  4:35 PM Date of Service:  4/14/2018  4:28 PM Creation Time:  4/14/2018  4:28 PM    Status:  Signed :  Tolu Gonzalez MD (Physician)         [facility]  Hospitalist Progress Note  Tolu Gonzalez MD  04/14/2018    Assessment & Plan   Guille Aguilar is a 77 year old male with hx of CAD s/p CABG, dCHF, CKD, suspected multiple myeloma, and non-compliance despite strong recommendations from various providers who presented on 4/9/2018 with diarrhea and generalized weakness, and was admitted for acute renal failure and new bone lesions concerning for progressive multiple myeloma. He also likely has prostate cancer.      Acute renal failure on CKD stage III due to obstructive nephropathy/urinary retention, Improving  He was noted to have an elevated PSA >10 in Nov 2017, and was referred to Urology, but did not follow up. Presented on this admission with diarrhea, progressive generalized weakness, and unintentional weight loss >50 lb in last 6 months. Creatinine 21.8 on arrival from baseline 1.4 range. Likely multifactorial from diarrhea, multiple myeloma, and obstruction from urinary retention. Rubio catheter was placed in the ED with return of >2L of urine. Nephrology has been involved in his care. His renal function has continued to improve with IV fluids and Rubio catheter placement. .  - Cr 1.16, 1.08 today, will follow  - off IV fluids, drinking ok  - Rubio catheter in place. Follow up with Urology in 1-2 weeks for further rubio management     Intermittent hypotension  RRT 4/12 for hypotension, likely due to profound autodiuresis amidst improving renal failure. Patient was noted to have been net neg 9L since admission despite IV fluids. BP improved with more aggressive fluid resuscitation.   - bp stable     Bone lesions due to multiple myeloma vs metastatic  disease  Possible prostate cancer                          Most recently saw Dr. Tavarez at  Oncology in Nov 2017, but was lost to follow up. It was felt at that time that the patient probably had myeloma with anemia, thrombocytopenia, renal insufficiency, and monoclonal gammopathy (IgG lambda) seen on SPEP/UPEP and immunofixation. Further work-up including bone marrow biopsy and skeletal survey was recommended, but patient did not follow up. CT abd/pelvis on arrival (obtained for diarrhea) incidentally showed numerous bone lesions as well as an enlarged prostate. As noted above, his PSA was previously elevated to >10, but patient has not followed up. MRI c/t/l spine (4/10) showed decreased T1 signal concerning for metastatic disease and acute to subacute compression fracture at T11-T12. Dr. Tavarez has discussed these findings with the patient, and has recommended pursuing diagnosis/treatment of MM first and then prostate cancer.                         Had care conference with patient and his wife on 4/11. The patient is very weak and deconditioned and wants to pursue trial of TCU, and attempt to get stronger before he pursues bone marrow biopsy. He remains DNR/DNI, but goals otherwise remain restorative  - Dr. Tavarez to arrange outpatient follow up with  Oncology  - Follow up with Urology in 1-2 weeks    Pancytopenia due to suspected multiple myeloma, Stable  Patient has developed progressive pancytopenia since Nov 2017. No s/sx of bleeding or infection. His CBC has been more or less stable throughout his hospital stay.     CAD, native heart, native vessels s/p 3-v CABG  [PTA: rosuvastatin 40 mg qhs] Tchol 96, HDL 36, LDL, 46 during admission  - Rosuvastatin was discontinued in favor of atorvastatin 10 mg daily  - Not on BB due to history of orthostatic hypotension. Not on aspirin due to history of melena     Elevated troponin due to demand ischemia  RRT 4/12 for hypotension. Serial troponins were obtained -  "minimally elevated to 0.08 and flat. Suspect demand ischemia due to hypotension and renal failure.     GERD  Started on ranitidine during his hospital stay for severe heartburn.   - Continues on ranitidine 150 mg BID, TUMS prn     Non-severe malnutrition in context of chronic illness  On Ensure supplements BID as per Nutrition     # Pain Assessment:  Current Pain Score 4/13/2018   Patient currently in pain? denies   Pain score (0-10) -   Pain location -   Pain descriptors -   Guille palacios pain level was assessed and he currently denies pain.       FEN: Regular diet  DVT Prophylaxis: Pneumatic Compression Devices  Code Status: DNR/DNI     Disposition:   - To TCU on 4/15 or 4/16      # Pain Assessment:  Current Pain Score 4/14/2018   Patient currently in pain? denies   Pain score (0-10) -   Pain location -   Pain descriptors -           Interval History   - chart reviewed  - no TCU bed available todat    -Data reviewed today: I reviewed all new labs and imaging over the last 24 hours. I personally reviewed no images or EKG's today.    Physical Exam   Heart Rate: 78, Blood pressure 125/80, pulse 90, temperature 96.7  F (35.9  C), temperature source Oral, resp. rate 16, height 1.88 m (6' 2\"), weight 77.1 kg (170 lb), SpO2 97 %.  Vitals:    04/12/18 0516 04/13/18 0602 04/14/18 0606   Weight: 73.5 kg (162 lb 0.6 oz) 72.8 kg (160 lb 6.4 oz) 77.1 kg (170 lb)     Vital Signs with Ranges  Temp:  [96  F (35.6  C)-96.8  F (36  C)] 96.7  F (35.9  C)  Pulse:  [90] 90  Heart Rate:  [74-78] 78  Resp:  [16] 16  BP: ()/(58-84) 125/80  SpO2:  [95 %-97 %] 97 %  I/O's Last 24 hours  I/O last 3 completed shifts:  In: 240 [P.O.:240]  Out: 2825 [Urine:2825]    Constitutional: Awake, alert, cooperative, no apparent distress  Respiratory: Clear to auscultation bilaterally, no crackles or wheezing  Cardiovascular: Regular rate and rhythm, normal S1 and S2, and no murmur noted  GI: Normal bowel sounds, soft, non-distended, " non-tender  Skin/Integumen: No rashes, no cyanosis, no edema  Other:      Medications   All medications were reviewed.      ranitidine  150 mg Oral BID     sodium chloride 0.9%  500 mL Intravenous Once     atorvastatin  10 mg Oral Daily     sodium chloride (PF)  3 mL Intracatheter Q8H        Data     Recent Labs  Lab 04/14/18  0730 04/13/18  0545 04/13/18  0155  04/12/18  1430 04/12/18  1007 04/12/18  0540  04/09/18  1615 04/09/18  1410   WBC 3.0* 3.5*  --   --   --   --  3.2*  < >  --  4.2   HGB 7.9* 7.5*  --   --   --   --  8.2*  < >  --  8.4*   MCV 88 88  --   --   --   --  88  < >  --  86   PLT 64* 57*  --   --   --   --  56*  < >  --  82*   INR  --   --   --   --   --   --  1.35*  --  1.44*  --     137  --   --   --   --  137  < >  --  131*   POTASSIUM 3.7 3.6  --   --   --   --  3.5  < > 7.0* 7.5*   CHLORIDE 107 109  --   --   --   --  107  < >  --  99   CO2 23 20  --   --   --   --  23  < >  --  17*   BUN 23 22  --   --   --   --  30  < >  --  187*   CR 1.08 1.16  --   --   --   --  1.45*  < >  --  21.80*   ANIONGAP 7 8  --   --   --   --  7  < >  --  15*   RAKEL 7.3* 7.3*  --   --   --   --  7.6*  < >  --  8.9   * 97 103*  < > 117* 125* 119*  < >  --  103*   ALBUMIN  --   --   --   --   --   --  1.6*  --   --  2.2*   PROTTOTAL  --   --   --   --   --   --  7.2  --   --  8.3   BILITOTAL  --   --   --   --   --   --  0.3  --   --  0.2   ALKPHOS  --   --   --   --   --   --  59  --   --  71   ALT  --   --   --   --   --   --  43  --   --  22   AST  --   --   --   --   --   --  39  --   --  15   LIPASE  --   --   --   --   --   --  487*  --   --  177   TROPI  --   --   --   --  0.075* 0.075* 0.087*  --   --  0.034   < > = values in this interval not displayed.    No results found for this or any previous visit (from the past 24 hour(s)).    Tolu Gonzalez MD  Text Page  (7am to 6pm)[PD1.1]          Revision History        User Key Date/Time User Provider Type Action    > PD1.1 4/14/2018  4:35 PM Carlos  Tolu Hogue MD Physician Sign            Progress Notes by Becky Davis BSW at 4/14/2018 11:34 AM     Author:  Becky Davis BSW Service:  (none) Author Type:      Filed:  4/14/2018 11:36 AM Date of Service:  4/14/2018 11:34 AM Creation Time:  4/14/2018 11:34 AM    Status:  Signed :  Becky Davis BSW ()         I: STAN reviewed chart; patient is anticipated to discharge to TCU when insurance authorization is confirmed.    P: Earliest DC would be Monday 04/16 due to patient's need for insurance authorization through [employer]. Insurance cannot be authorized over the weekend. Clinically, patient has been accepted at [facility]; pending insurance auth. SW will continue to follow for discharge. PAS completed [id number] and placed in medical chart.     Becky Davis LSW  Float   4/14/2018 11:36 AM   Phone: [phone][KH1.1]     Revision History        User Key Date/Time User Provider Type Action    > KH1.1 4/14/2018 11:36 AM Becky Davis BSW  Sign                  Procedure Notes     No notes of this type exist for this encounter.         Progress Notes - Therapies (Notes from 04/14/18 through 04/17/18)      Progress Notes by Zully Mehta, PT at 4/17/2018 11:51 AM     Author:  Zully Mehta, PT Service:  Physical Medicine and Rehabilitation Author Type:  Physical Therapist    Filed:  4/17/2018 11:51 AM Date of Service:  4/17/2018 11:51 AM Creation Time:  4/17/2018 11:51 AM    Status:  Signed :  Zully Mehta, PT (Physical Therapist)          04/17/18 1100   Quick Adds   Type of Visit Initial PT Evaluation   Living Environment   Lives With spouse   Living Arrangements house   Home Accessibility stairs to enter home;stairs within home;tub/shower is not walk in   Number of Stairs to Enter Home 3   Number of Stairs Within Home 14   Stair Railings at Home inside, present at both sides;outside, present on right side   Transportation Available  car;family or friend will provide   Self-Care   Dominant Hand right   Usual Activity Tolerance moderate   Current Activity Tolerance fair   Equipment Currently Used at Home none   Functional Level Prior   Ambulation 0-->independent   Transferring 0-->independent   Toileting 0-->independent   Bathing 0-->independent   Dressing 0-->independent   Eating 0-->independent   Communication 0-->understands/communicates without difficulty   Swallowing 0-->swallows foods/liquids without difficulty   Cognition 0 - no cognition issues reported   Fall history within last six months yes   Number of times patient has fallen within last six months 2   General Information   Onset of Illness/Injury or Date of Surgery - Date 04/09/18   Referring Physician Jarad Bunch   Patient/Family Goals Statement planning on TCU   Pertinent History of Current Problem (include personal factors and/or comorbidities that impact the POC) Guille Aguilar is a 77 year old male with hx of CAD s/p CABG, dCHF, CKD, suspected multiple myeloma, and non-compliance despite strong recommendations from various providers who presented on 4/9/2018 with diarrhea and generalized weakness, and was admitted for acute renal failure and new bone lesions concerning for progressive multiple myeloma. He also likely has prostate cancer.    Precautions/Limitations fall precautions   Weight-Bearing Status - LUE full weight-bearing   Weight-Bearing Status - RUE full weight-bearing   Weight-Bearing Status - LLE full weight-bearing   Weight-Bearing Status - RLE full weight-bearing   Cognitive Status Examination   Orientation orientation to person, place and time   Level of Consciousness lethargic/somnolent   Follows Commands and Answers Questions 100% of the time;able to follow single-step instructions   Personal Safety and Judgment impaired;at risk behaviors demonstrated  (not positioning safely prior to sit)   Pain Assessment   Patient Currently in Pain No   Posture   "  Posture Kyphosis;Protracted shoulders;Forward head position   Range of Motion (ROM)   ROM Comment limited trunk ROM with postureal deformities   Strength   Strength Comments decreased strength throughout, B LE with significant decreased mm endurance, able to sit tostand with min A   Bed Mobility   Bed Mobility Comments bed mob with min A and A for set up   Transfer Skills   Transfer Comments sit to stand with FWW and cues for hand placement and safetay   Gait   Gait Comments amb with min A and FWW, very fatigued, very small steps wiht min clearance, 10'   Balance   Balance Comments impaired standing dynamic balance requires B UE support, likely due to LE and trunk weakness   Muscle Tone   Muscle Tone no deficits were identified   General Therapy Interventions   Planned Therapy Interventions gait training;bed mobility training;strengthening;transfer training;ROM   Clinical Impression   Criteria for Skilled Therapeutic Intervention yes, treatment indicated   PT Diagnosis difficulty transferring and amb   Influenced by the following impairments significant decrease in strength and act marshal, extreme fatigue, low Bp's, decreased balance, decreased ROM with postural deficits   Functional limitations due to impairments impaired functional mob I and safety and marshal   Clinical Presentation Evolving/Changing   Clinical Presentation Rationale 3-5 deficits   Clinical Decision Making (Complexity) Moderate complexity   Therapy Frequency` daily   Predicted Duration of Therapy Intervention (days/wks) 3 days   Anticipated Discharge Disposition Transitional Care Facility   Risk & Benefits of therapy have been explained Yes   Patient, Family & other staff in agreement with plan of care Yes   [employer] Anna Lozabai TM \"6 Clicks\"   2016, Trustees of [employer], under license to [employer].  All rights reserved.   6 Clicks Short Forms Basic Mobility Inpatient Short Form   [employer] AM-PAC  \"6 Clicks\" V.2 Basic Mobility " Inpatient Short Form   1. Turning from your back to your side while in a flat bed without using bedrails? 3 - A Little   2. Moving from lying on your back to sitting on the side of a flat bed without using bedrails? 3 - A Little   3. Moving to and from a bed to a chair (including a wheelchair)? 3 - A Little   4. Standing up from a chair using your arms (e.g., wheelchair, or bedside chair)? 3 - A Little   5. To walk in hospital room? 3 - A Little   6. Climbing 3-5 steps with a railing? 2 - A Lot   Basic Mobility Raw Score (Score out of 24.Lower scores equate to lower levels of function) 17   Total Evaluation Time   Total Evaluation Time (Minutes) 13[SF1.1]        Revision History        User Key Date/Time User Provider Type Action    > SF1.1 4/17/2018 11:51 AM Zully Mehta, PT Physical Therapist Sign

## 2018-04-09 NOTE — ED NOTES
Feeling shakey from albuterol. Alert and oriented.    Following rubio placement, patient reports he had no urge to void despite having >1L return. Bladder scan: 34ml    Urine output from rubio: 2600ml. MD aware

## 2018-04-09 NOTE — PHARMACY-ADMISSION MEDICATION HISTORY
Admission medication history interview status for the 4/9/2018  admission is complete. See EPIC admission navigator for prior to admission medications     Medication history source reliability:Good    Actions taken by pharmacist (provider contacted, etc): interview with patient.  Called [facility] to clarify dose as pt was unsure.       Additional medication history information not noted on PTA med list : Pt states he usually takes it at bedtime but took it this morning.      Medication reconciliation/reorder completed by provider prior to medication history? No    Time spent in this activity: 10 min    Prior to Admission medications    Medication Sig Last Dose Taking? Auth Provider   rosuvastatin (CRESTOR) 40 MG tablet Take 1 tablet (40 mg) by mouth At Bedtime 4/9/2018 at am Yes Monster Washington MD

## 2018-04-10 NOTE — PLAN OF CARE
Problem: Patient Care Overview  Goal: Plan of Care/Patient Progress Review  PT and OT: Received eval orders, chart reviewed.  Pt admitted last night, work up still ongoing.  Holding evals this date to allow for work up completion prior to initiating evals.

## 2018-04-10 NOTE — CONSULTS
Consult Date:  04/10/2018      REQUESTING PHYSICIAN:    This consult has been requested by Dr. Serna for monoclonal gammopathy.      HISTORY OF PRESENT ILLNESS:    Mr. Aguilar is a 77-year-old gentleman with monoclonal gammopathy.  I had seen him in the clinic on 11/15/2017.  Multiple labs had been done on 11/10/2017.  SPEP had revealed an M-spike of 4.0.  Immunofixation revealed IgG lambda.  IgG level was 5110.  Urine immunofixation also revealed IgG lambda and free lambda chain.  Urine electrophoresis revealed M-spike of 21.        Labs were suspicious for multiple myeloma.  I discussed with him regarding further workup.  Bone marrow biopsy and skeletal survey was ordered. Patient cancelled it and did not follow up in the clinic.      Patient was brought to the Emergency Room on 04/09/2018 because of diarrhea and weakness. He has been weak for last few months, but it got worse in the last 1-2 month.  In the last 4-5 weeks, he also started to have diarrhea.  Appetite has been decreased.  He has been losing weight.  With these complaints, he was brought to the ER on 04/09/2018 and had multiple investigations done on 04/09/2018.   -- WBC of 4.2, hemoglobin of 8.4 and platelets of 82.  MCV of 86.   -- Creatinine of 21.8.   -- Potassium of 7.5.     -- Calcium of 8.9.      Patient is getting IV hydration.  His creatinine has improved to 10.1.  His potassium has normalized.      REVIEW OF SYSTEMS:    Denies any headache.  Some dizziness.  No chest pain.  No shortness of breath.  No nausea or vomiting.  Appetite has been decreased. Patient has some urinary retention from enlarged prostate. Patient has a Maya catheter.  As per the nurse, his urine volume has been increasing.  It is blood tinged urine, which I think is traumatic.  His diarrhea is improving.      Patient denies any recent infection.  No upper respiratory infection in the last few weeks.  No fevers or chills.      All other review of systems negative.       ALLERGIES:  REVIEWED.      MEDICATIONS:  Reviewed.      PAST MEDICAL HISTORY:   1.  Coronary artery disease, status post stent placement.   2.  Hyperlipidemia.   3.  Hypertension.   4.  Numbness in both feet.   5.  Glaucoma.   6.  Monoclonal gammopathy.      PAST SURGICAL HISTORY:   1.  Cataract surgery.   2.  Cardiac bypass surgery.      SOCIAL HISTORY:    -No smoking.  -Occasional alcohol use.      PHYSICAL EXAMINATION:   GENERAL:  He was alert.  He looked weak.   VITALS:  Reviewed.   Rest of the systems not examined.      LABORATORY DATA:  Reviewed.   -CT scan and MRI reviewed.     ASSESSMENT:   1.  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   2.  Pancytopenia.   3.  Acute renal failure.   4.  Elevated PSA and enlarged prostate suspicious for prostate cancer.      RECOMMENDATIONS:   1.  I discussed with the patient regarding his abnormal labs.  I explained to the patient it that I am highly suspicious that he has multiple myeloma.     His M-spike and IgG level has been highly elevated.  Patient had CT abdomen and pelvis yesterday which reveals multiple bone lesions.  Probably they are myeloma related, but could be from prostate cancer also. Patient had MRI of cervical, thoracic and lumbar spine done today.  It reveals marrow infiltrative disorder which would go more along with myeloma instead of prostate cancer. Patient has pancytopenia.  This will go along with multiple myeloma causing bone marrow replacement.     2.  Discussed regarding further workup. To establish a diagnosis, we need a bone marrow biopsy. Patient wants to think about it and wants to discuss with his wife.  When agreeable, we will plan on doing a bone marrow biopsy.     3.  Once diagnosis of myeloma is established, will start him on treatment.  As he has kidney involvement, it is important that treatment be started soon with Velcade based regimen.     4. Patient had a few questions, which were all answered. Discussed  with Dr. Lutz from Nephrology and Dr. Segura.      Thanks for the consult.      Total time spent 60 minutes, more than 50% of time spent in counseling and coordination of care.         ELIZABETH NEVILLE MD             D: 04/10/2018   T: 04/10/2018   MT: CADE      Name:     ARCHIE ALDANA   MRN:      [id number]        Account:       [id number]   :      1940           Consult Date:  04/10/2018      Document: [id number]       cc: Jason Serna MD

## 2018-04-10 NOTE — CONSULTS
"CLINICAL NUTRITION SERVICES  -  ASSESSMENT NOTE      RECOMMENDATIONS FOR MD/PROVIDER TO ORDER:   Consider appetite stimulant     Recommendations Ordered by Registered Dietitian (RD):   Ensure BID between meals     Malnutrition:   % Weight Loss:  > 20% in 1 year (severe malnutrition)  % Intake:  <75% for >/= 3 months (non-severe malnutrition)  Subcutaneous Fat Loss:  None observed  Muscle Loss:  None observed  Fluid Retention:  None noted    Malnutrition Diagnosis: Non-Severe malnutrition  In Context of:  Chronic illness or disease          REASON FOR ASSESSMENT  Guille Aguilar is a 77 year old male seen by Registered Dietitian for Admission Nutrition Risk Screen - Unintentional weight loss of 10# or more in past 2 months      NUTRITION HISTORY  - Information obtained from the pt, however he was very sleepy so hx was limited.  He states his appetite has been poor on and off for the past 5 months, see wt loss below.  - Supplements - pt was taking Ensure once/day.      CURRENT NUTRITION ORDERS  Diet Order:     2000 mg Sodium     Current Intake/Tolerance:  Poor appetite, he ate 25% of an egg scramble and couldn't eat any more. He's will to take Ensure between meals.      PHYSICAL FINDINGS  Observed  No nutrition-related physical findings observed  Obtained from Chart/Interdisciplinary Team  None noted    ANTHROPOMETRICS  Height: 6' 2\"  Weight: 168 lbs 10.43 oz (76.5 kg)  Body mass index is 21.65 kg/(m^2).  Weight Status:  Normal BMI  IBW: 86.4 kg +/- 10%  % IBW: 88%  Weight History: Per the records below pt has lost a total of 51# (23%) in the past year; 24# (13%) was just in the past 6 months.  Wt Readings from Last 12 Encounters:   04/10/18 76.5 kg (168 lb 10.4 oz)   02/23/18 80.1 kg (176 lb 8 oz)   11/20/17 87.5 kg (193 lb)   11/15/17 87.5 kg (193 lb)   11/13/17 87.2 kg (192 lb 4.8 oz)   11/10/17 89.4 kg (197 lb)   10/20/17 89.4 kg (197 lb)   10/17/17 88.9 kg (196 lb)   10/02/17 87.5 kg (193 lb)   05/30/17 94.3 kg " (208 lb)   02/27/17 99.8 kg (220 lb)   11/08/16 103.4 kg (228 lb)       LABS  Labs reviewed    MEDICATIONS  Medications reviewed      ASSESSED NUTRITION NEEDS PER APPROVED PRACTICE GUIDELINES:    Dosing Weight 76.5 kg (4/10)  Estimated Energy Needs: 6702-9583 kcals (30-35 Kcal/Kg)  Justification: underweight  Estimated Protein Needs: 75-90 grams protein (1-1.2 g pro/Kg)  Justification: Repletion and CKD      MALNUTRITION:  % Weight Loss:  > 20% in 1 year (severe malnutrition)  % Intake:  <75% for >/= 3 months (non-severe malnutrition)  Subcutaneous Fat Loss:  None observed  Muscle Loss:  None observed  Fluid Retention:  None noted    Malnutrition Diagnosis: Non-Severe malnutrition  In Context of:  Chronic illness or disease    NUTRITION DIAGNOSIS:  Inadequate oral intake related to decreased appetite as evidenced by 23% wt loss x 1 yr      NUTRITION INTERVENTIONS  Recommendations / Nutrition Prescription  Continue current diet  Nutritional supplements  Consider appetite stimulant      Implementation  Nutrition education: Per Provider order if indicated   Medical Food Supplement - Ensure between meals      Nutrition Goals  Pt will consume at least 50% of meals and supplements.      MONITORING AND EVALUATION:  Progress towards goals will be monitored and evaluated per protocol and Practice Guidelines      Huong Snider RD  Pager [phone] (M-F)            [phone] ([facility] Hol)

## 2018-04-10 NOTE — PROGRESS NOTES
[facility]     Renal Progress Note       SHORTHAND KEY FOR MY NOTES:  c = with, s = without, p = after, a = before, x = except, asx = asymptomatic, tx = transplant or treatment, sx = symptoms or symptomatic, cx = canceled or culture, rxn = reaction, yday = yesterday, nl = normal, abx = antibiotics, fxn = function, dx = diagnosis, dz = disease, m/h = melena/hematochezia, c/d/l/ha = cramping/dizziness/lightheadedness/headache, d/c = discharge or diarrhea/constipation, f/c/n/v = fevers/chills/nausea/vomiting, cp/sob = chest pain/shortness of breath.         Assessment/Plan:     1.  NIKKI/CKD.  Pt's cr is down to ~10 and he is urinating quite a bit.  The rubio has helped, but he will require a Urology consult.  Chemistries are ok.  No need for HD.  No uremic sx.  TBV is ok.  A.  Follow labs, uo, sx.  B.  Continue rubio.  C.  Avoid nephrotoxics.    2.  Possible MM.  He is being seen by Dr. Tavarez.  Plans for a possible BMBx have been discussed.  Labs pending.  A.  Per Dr. Tavarez.    3.  Panyctopenia. Prob related to #2.  A.  Follow labs, clinically.    4.  Met acidosis.  Pt's acidosis is better, but his k is low from the bicarb and previous med mgmt.  A.  Take bicarb out of the fluids.  B.  Follow labs - check BMP at 8 pm.    5.  FEN.  Given the low k, he doesn't need a dialysis diet.  Also, he isn't eating much, so we should liberalize the diet a bit.  A.  Change to low Na diet.        Interval History:     Pt is feeling a bit better today, but is still quite tired.  No f/c/n/v.  He ate a little bit today.  No c/d/l and he's had significant uo via the rubio.          Medications and Allergies:       sodium chloride (PF)  3 mL Intracatheter Q8H      Allergies   Allergen Reactions     Dust Mites      No Known Allergies           Physical Exam:     Vitals were reviewed    Heart Rate: 77, Blood pressure 102/64, pulse 93, temperature 98.5  F (36.9  C), temperature source Axillary, resp. rate 15, height 1.88 m  "(6' 2\"), weight 76.5 kg (168 lb 10.4 oz), SpO2 95 %.  Wt Readings from Last 3 Encounters:   04/10/18 76.5 kg (168 lb 10.4 oz)   02/23/18 80.1 kg (176 lb 8 oz)   11/20/17 87.5 kg (193 lb)     Intake/Output Summary (Last 24 hours) at 04/10/18 1502  Last data filed at 04/10/18 1229   Gross per 24 hour   Intake             1962 ml   Output            84890 ml   Net            -8538 ml     GENERAL APPEARANCE: pleasant, NAD, alert, sleepy  HEENT:  Eyes/ears/nose/neck grossly normal  RESP: lungs cta b c good efforts, no crackles  CV: RRR, nl S1/S2  ABDOMEN: o/s/nt/nd  EXTREMITIES/SKIN: tr ble edema  OTHER: + rubio         Data:     CBC RESULTS:     Recent Labs  Lab 04/10/18  0650 04/09/18  2125 04/09/18  1410   WBC 2.8* 3.6* 4.2   RBC 2.61* 2.51* 2.84*   HGB 7.7* 7.5* 8.4*   HCT 22.3* 21.4* 24.5*   PLT 69* 71* 82*     Basic Metabolic Panel:    Recent Labs  Lab 04/10/18  1050 04/10/18  0650 04/10/18  0240 04/09/18 2125 04/09/18 2006 04/09/18  1705 04/09/18  1615 04/09/18  1410   NA  --  141  --   --  137 134  --  131*   POTASSIUM  --  3.3*  --   --  4.5 6.0* 7.0* 7.5*   CHLORIDE  --  107  --   --  102 99  --  99   CO2  --  19*  --   --  16* 13*  --  17*   BUN  --  122*  --   --  163* 182*  --  187*   CR  --  10.10*  --   --  17.60* 19.30*  --  21.80*   * 104* 91 90 111* 156*  --  103*   RAKEL  --  8.7  --   --  8.8 9.1  --  8.9     INR  Recent Labs  Lab 04/09/18  1615   INR 1.44*      Attestation:   I have reviewed today's relevant vital signs, notes, medications, labs and imaging.    Gregorio Lutz MD  [facility] - Nephrology  [phone]  "

## 2018-04-10 NOTE — PLAN OF CARE
Problem: Patient Care Overview  Goal: Plan of Care/Patient Progress Review  Outcome: No Change  : ED transfer to station [address] [facility] [place], [place]. A&O x4, tele SR w/ BBB. MD aware of abnormal labs and red-pink urine via rubio cath. Spoke with Dr. Larios. Pt denies any pain. Incontinent of loose stool-enteric precautions.  Pre-existing skin bruises/ecchymosis on knees, red scab area on posterior tibial, excoriated buttocks and red scrotum. Applied barrier moisture cream. Plans to have MRI cervical, thoracic & lumbar spine tonight and hema-oncology consult tomorrow. Will monitor.

## 2018-04-10 NOTE — PLAN OF CARE
Problem: Patient Care Overview  Goal: Plan of Care/Patient Progress Review  Outcome: No Change  Lethargic. VSS. Denies pain. REpo q2. No loose stools for stift. BM excoriated, barrier cream applied. Maya, large output, blood tinged. Tolerating regular diet. [facility] [place] d/c'd at 1330

## 2018-04-10 NOTE — PLAN OF CARE
Problem: Diarrhea (Adult)  Goal: Identify Related Risk Factors and Signs and Symptoms  Related risk factors and signs and symptoms are identified upon initiation of Human Response Clinical Practice Guideline (CPG).   A/O, VSS, on room air.  Pt has had 3 loose, inc stools--so far unable to collect specimen.  Urine output good per rubio--urine is red, some shreds in urine.  No clots noted.  Tele is SR.  MRI done-no results noted yet.

## 2018-04-10 NOTE — PROGRESS NOTES
[facility]    Hospitalist Progress Note    Date of Service (when I saw the patient): 04/10/2018    Assessment & Plan   Guille Aguilar is a 77 year old male with hx of CAD s/p CABG, dCHF, CKD, suspected multiple myeloma, and non-compliance despite strong recommendations from various providers who presented on 4/9/2018 with diarrhea and generalized weakness, and was admitted for acute renal failure and new bone lesions concerning for progressive MM vs prostate metastases.    Acute renal failure on CKD stage III due to obstructive nephropathy/urinary retention  He was noted to have an elevated PSA >10 in Nov 2017, and was referred to Urology, but did not follow up. Presented on this admission with diarrhea, progressive generalized weakness, and unintentional weight loss >50 lb in last 6 months. Creatinine 21.8 on arrival from baseline 1.4 range. Likely multifactorial from diarrhea, multiple myeloma, and obstruction from urinary retention. Maya catheter was placed in the ED with return of >2L of urine. Nephrology has been involved in his care. His renal function has improved with IV fluids and Maya catheter placement. .  - Cr 10.1 today, will follow  - Continues on IV fluids as per Nephrology  - Maya catheter in place    Bone lesions due to multiple myeloma vs metastatic disease  Most recently saw Dr. Tavarez at  Oncology in Nov 2017, but was lost to follow up. It was felt at that time that the patient probably had myeloma with anemia, thrombocytopenia, renal insufficiency, and monoclonal gammopathy (IgG lambda) seen on SPEP/UPEP and immunofixation. Further work-up including bone marrow biopsy and skeletal survey was recommended, but patient did not follow up. CT abd/pelvis on arrival (obtained for diarrhea) incidentally showed numerous bone lesions as well as an enlarged prostate. As noted above, his PSA was previously elevated to >10, but patient has not followed up. MRI c/t/l spine (4/10) showed  decreased T1 signal concerning for metastatic disease and acute to subacute compression fracture at T11-T12.  - Care conference tomorrow at 10:30a  - [facility] following, appreciate assistance    Pancytopenia due to suspected multiple myeloma  Patient has developed progressive pancytopenia since Nov 2017. No s/sx of bleeding or infection.  - wbc 2.8k, Hgb 7.7, Plt 69k  - Repeat CBC in AM    CAD, native heart, native vessels s/p 3-v CABG  [PTA: rosuvastatin 40 mg qhs]   - Hold rosuvastatin and check lipid panel in AM  - Not on BB due to history of orthostatic hypotension. Not on aspirin due to history of melena.    # Pain Assessment:  Current Pain Score 4/10/2018   Patient currently in pain? denies   Guille s pain level was assessed and he currently denies pain.      FEN: 2 gram sodium diet  DVT Prophylaxis: Pneumatic Compression Devices  Code Status: DNR/DNI    Disposition: Expected discharge pending ongoing improvement in renal function, clarification of goals of care. Several days    Lily Segura    Interval History   No events overnight. Denies pain or nausea. Met Dr. Tavarez earlier today, and states he is not sure how he wants to proceed, but is leaning toward no further work-up. Discussed with Dr. Tavarez. Discussed with patient's wife at length  - [place] [facility] [place]  - discussed with Nephrology: IV fluids adjusted  - Care conference tomorrow    -Data reviewed today: I reviewed all new labs and imaging results over the last 24 hours. I personally reviewed the MR C/T/L spine image(s) showing abnormal signal at T1 and wedge compression at T11 and T12.    Physical Exam   Temp: 98.5  F (36.9  C) Temp src: Axillary BP: 102/64 Pulse: 93 Heart Rate: 77 Resp: 15 SpO2: 95 % O2 Device: None (Room air)    Vitals:    04/09/18 1402 04/09/18 2100 04/10/18 0202   Weight: 81.2 kg (179 lb) 77.9 kg (171 lb 11.8 oz) 76.5 kg (168 lb 10.4 oz)     Vital Signs with Ranges  Temp:  [97.7  F (36.5  C)-98.5  F (36.9  C)] 98.5  F (36.9   C)  Pulse:  [93] 93  Heart Rate:  [] 77  Resp:  [10-27] 15  BP: ()/() 102/64  SpO2:  [92 %-100 %] 95 %  I/O last 3 completed shifts:  In: 1082 [P.O.:540; I.V.:542]  Out: 7950 [Urine:7950]    Constitutional: Appears comfortable, NAD  Respiratory: Breathing non-labored. Lungs CTAB - no wheezes, crackles, or rhonchi  Cardiovascular: Heart RRR, no m/r/g. Trace pedal edema  GI: +BS, abd soft/NT  Skin/Integumen: No rash  Neuro: Alert and appropriate, BAPTISTE  Psych: Calm and cooperative    Medications     IV infusion builder WITH additives 75 mL/hr at 04/10/18 1129       sodium chloride (PF)  3 mL Intracatheter Q8H     Data     Recent Labs  Lab 04/10/18  1050 04/10/18  0650 04/10/18  0240 04/09/18  2125 04/09/18 2006 04/09/18  1705 04/09/18  1615 04/09/18  1410   WBC  --  2.8*  --  3.6*  --   --   --  4.2   HGB  --  7.7*  --  7.5*  --   --   --  8.4*   MCV  --  85  --  85  --   --   --  86   PLT  --  69*  --  71*  --   --   --  82*   INR  --   --   --   --   --   --  1.44*  --    NA  --  141  --   --  137 134  --  131*   POTASSIUM  --  3.3*  --   --  4.5 6.0* 7.0* 7.5*   CHLORIDE  --  107  --   --  102 99  --  99   CO2  --  19*  --   --  16* 13*  --  17*   BUN  --  122*  --   --  163* 182*  --  187*   CR  --  10.10*  --   --  17.60* 19.30*  --  21.80*   ANIONGAP  --  15*  --   --  19* 22*  --  15*   RAKEL  --  8.7  --   --  8.8 9.1  --  8.9   * 104* 91 90 111* 156*  --  103*   ALBUMIN  --   --   --   --   --   --   --  2.2*   PROTTOTAL  --   --   --   --   --   --   --  8.3   BILITOTAL  --   --   --   --   --   --   --  0.2   ALKPHOS  --   --   --   --   --   --   --  71   ALT  --   --   --   --   --   --   --  22   AST  --   --   --   --   --   --   --  15   LIPASE  --   --   --   --   --   --   --  177   TROPI  --   --   --   --   --   --   --  0.034       Recent Results (from the past 24 hour(s))   Abd/pelvis CT no contrast - [facility] Protocol    Narrative    CT ABDOMEN AND PELVIS WITHOUT CONTRAST   4/9/2018 5:40 PM    HISTORY: Acute renal failure, diarrhea.     COMPARISON: A CT on 11/14/2017.    TECHNIQUE: Routine transverse CT imaging of the abdomen and pelvis was  performed without contrast. Radiation dose for this scan was reduced  using automated exposure control, adjustment of the mA and/or kV  according to patient size, or iterative reconstruction technique.    FINDINGS: There is mild atelectasis in both lung bases. The liver,  spleen, pancreas, gallbladder, and adrenal glands are normal. There  are cysts in the right kidney. The kidneys are otherwise unremarkable.  A catheter is seen within a decompressed bladder. The prostate gland  is again noted to be markedly enlarged. No enlarged lymph node or  other abnormal mass is demonstrated. No free fluid is seen. No free  intraperitoneal gas is identified. The gastrointestinal tract is  unremarkable. The appendix is not identified. There is no additional  evidence of appendicitis. There is calcification in the vascular  structures. There has been development of innumerable lesions  throughout the osseous structures. These are predominantly sclerotic  with a few demonstrating some internal lucency. This includes a lesion  in the right iliac bone measuring 1.8 cm adjacent to the sacroiliac  joint. In the left iliac bone there are two adjacent lesions or a  single larger lesion measuring 2.7 cm in total length. Smaller lesions  are seen elsewhere in the pelvis and spine. There are degenerative  changes elsewhere in the spine. No abdominal or pelvic wall pathology  is demonstrated.       Impression    IMPRESSION:   1. Development of numerous bone lesions suspicious for metastatic  disease.  2. Again seen is enlargement of the prostate gland. A catheter is seen  within a decompressed bladder.       MOHIT HAMPTON MD   XR Chest Port 1 View    Narrative    XR CHEST PORT 1 VW  4/9/2018 9:13 PM     HISTORY:  shortness of breath;     COMPARISON: Film dated  4/1/2011    FINDINGS:  Midline sternotomy. Heart is normal in size. Mild left  basilar opacity consistent with a small amount of infiltrate or  atelectasis. The right lung is clear.      Impression    IMPRESSION: Small left basilar opacity consistent with a small area of  infiltrate or atelectasis.    ORI ARELLANO MD   MR Thoracic Spine w/o Contrast    Narrative    MRI THORACIC SPINE WITHOUT CONTRAST April 10, 2018 1:24 AM     HISTORY: Rule out obstruction.    TECHNIQUE: Multiplanar multisequence MRI of the thoracic spine without  contrast.    COMPARISON: None.    FINDINGS: Normal thoracic kyphosis. Anterior posterior alignment of  the thoracic spine within normal limits. There is diffusely decreased  T1 signal throughout the bone marrow.    Mild anterior wedging and superior endplate deformity of the T11 and  T12 vertebral bodies with mild associated endplate STIR hyperintense  edema. These are concerning for acute/subacute fractures versus  Schmorl's node deformities. No posterior displacement of the vertebral  body into the spinal canal. No other loss of vertebral body height.    Mild/moderate loss of intervertebral disc height throughout all levels  in the thoracic spine with associated mild degenerative endplate  changes. No significant disc herniations. No significant spinal canal  or neural foraminal narrowing.    Visualized thoracic spinal cord is unremarkable.    Small bilateral pleural effusions partially visualized.      Impression    IMPRESSION:    1. Acute/subacute mild superior endplate wedge compression and/or  Schmorl's node at T11 and T12. No other fractures visualized. No  posterior displacement of fracture fragments into the spinal canal.  2. Abnormal marrow signal suspicious for marrow infiltrative process.  3. Multilevel degenerative endplate changes throughout the thoracic  spine without significant disc herniations. No spinal canal or neural  foraminal narrowing.  4. Small bilateral pleural  effusions partially visualized.    AUGUSTIN OLSON MD   MR Cervical Spine w/o Contrast    Narrative    MRI CERVICAL SPINE WITHOUT CONTRAST April 10, 2018 1:25 AM     HISTORY: Rule out obstruction.     TECHNIQUE: Multiplanar, multisequence MRI of the cervical spine  without contrast.     COMPARISON: None.    FINDINGS: Imaging is mildly degraded by motion artifact.    Marrow signal is diffusely T1 hypointense which could represent  metastatic disease or other marrow infiltrative disorder.    Normal cervical lordosis. No loss of vertebral body height. There is  loss of intervertebral disc space and degenerative endplate changes at  multiple levels most pronounced at C5-C6 and C6-C7 but also seen  throughout the other visualized levels.    No definite abnormal signal within the spinal cord.     Level by level as follows:    C2-C3: Small circumferential disc bulge without spinal canal or neural  foraminal narrowing.     C3-C4: Small circumferential disc bulge and endplate osteophytic  spurring as well as left greater than right facet hypertrophy.  Findings result in mild bilateral neural foraminal narrowing left  greater than right. No significant spinal canal narrowing.     C4-C5: Posterior disc bulge and endplate osteophytic spurring along  with bilateral facet hypertrophy. Findings result in mild spinal canal  narrowing and moderate bilateral neural foraminal narrowing left  greater than right.     C5-C6: Posterior disc bulge and endplate osteophytic spurring along  with bilateral facet hypertrophy. Findings result in mild to moderate  spinal canal narrowing and moderate bilateral neural foraminal  narrowing.     C6-C7: Posterior disc bulge and endplate osteophytic spurring without  spinal canal narrowing. Mild bilateral neural foraminal narrowing due  to disc bulge and uncinate spurring.     C7-T1: Mild posterior disc bulge and bilateral facet hypertrophy  results in mild bilateral neural foraminal narrowing. No spinal  canal  narrowing.     Paraspinous soft tissues are unremarkable.      Impression    IMPRESSION:     1. Diffusely decreased T1 signal throughout the bone marrow concerning  for metastatic disease. Other marrow infiltrative disorders including  leukemia, lymphoma, or multiple myeloma could also be considered.  2. No evidence of vertebral body collapse or fracture.  3. Multilevel degenerative changes in the cervical spine as described  above. Imaging is mildly degraded by motion artifact.    AUGUSTIN OLSON MD   MR Lumbar Spine w/o Contrast    Narrative    MR LUMBAR SPINE WITHOUT CONTRAST April 10, 2018 1:49 AM    HISTORY: Possible multiple myeloma. Urinary retention.    TECHNIQUE: Sagittal T1 and T2 and STIR, axial proton density and T2  images.    COMPARISON: None.    FINDINGS: Five functional lumbar vertebral segments are assumed. The  caudal thecal sac contents appear intrinsically normal. Mild  degenerative retrolisthesis L1 on L2 and L2 on L3. Alignment in the  sagittal plane is otherwise normal. There is mild curvature lumbar  spine convex to the left. Mild superior wedge deformity and/or  broad-based Schmorl's node of T12 associated with mild bone marrow  edema indicating an acute component. No retropulsion of bony elements.  In addition, there is patchy abnormal decreased T1 signal throughout  the visualized lumbosacral spine bone marrow. These areas show mostly  normal signal on STIR images but the pattern is worrisome for a bone  marrow infiltrating process, especially in a patient with suspected  multiple myeloma. Clinical correlation is recommended.    T12-L1: Normal.    L1-L2: Moderate degenerative disc space narrowing without signal loss.  Diffuse disc bulging with a probable small very broad-based central  disc protrusion and mild central stenosis. At least mild bilateral  foraminal stenosis, left greater than right. Posterior facets show no  significant degenerative change.    L2-L3: Signal loss and  advanced right-sided degenerative disc space  narrowing with chronic disc bulging and and some endplate spurring but  no acute disc protrusion. Mild central stenosis. Mild to moderate  right and minimal left foraminal stenosis. Mild posterior facet  degenerative changes on the right.    L3-L4: Signal loss and moderate to advanced right-sided degenerative  disc space narrowing. Mild diffuse disc bulging superimposed on a  congenitally small bony canal and thickening of ligamentum flavum with  mild overall central stenosis as well as right L4 lateral recess  stenosis (image 29 of series 801). Moderate right and minimal left  foraminal stenosis. Mild posterior facet degenerative changes  bilaterally.    L4-L5: Signal loss and advanced left-sided degenerative disc space  narrowing with diffuse disc bulging and some endplate spurring  combining with a congenitally small bony canal and thickening of the  ligamentum flavum causing mild central stenosis and mild bilateral  lateral recess stenosis (images 35 and 36 of series 801). Mild  bilateral foraminal stenosis, left greater than right. Mild to  moderate left and mild right posterior facet degenerative change.    L5-S1: Signal loss, moderate degenerative disc space narrowing and  minimal left posterolateral disc bulging without disc protrusion or  central or significant foraminal stenosis. Mild posterior facet  degenerative changes on the left.    Paraspinal soft tissues are unremarkable.      Impression    IMPRESSION:   1. Acute/subacute mild superior endplate wedge compression and/or  broad-based Schmorl's node at T12.  2. Abnormal bone marrow signal suspicious for marrow infiltrating  process.  3. Multilevel advanced degenerative disc disease with multilevel mild  central stenosis. Multilevel mild and mild-to-moderate foraminal  stenosis bilaterally as described above.

## 2018-04-10 NOTE — PLAN OF CARE
Problem: Patient Care Overview  Goal: Plan of Care/Patient Progress Review  Outcome: No Change  Pt is oriented but lethargic, VSS, denies pain. 2g Na+ diet. No BM this shift. Maya patent, large output, Brittnee colored. Repo q2. Stool sample still needed.

## 2018-04-11 NOTE — CONSULTS
Progress Note:  04/11/2018      SUBJECTIVE:    Mr. Aguilar is a 77-year-old gentleman with monoclonal gammopathy. Labs are highly suspicious for multiple myeloma.      I had seen him yesterday.  I discussed with him regarding further workup including bone marrow biopsy. Patient wanted to think about it.      I met with the patient.  His wife was present. I reviewed the labs.  I explained to them the labs are highly suspicious for multiple myeloma.      Discussed regarding multiple myeloma. To establish a diagnosis we need a bone marrow biopsy.  I  discussed regarding prognosis and treatment options briefly.  Details will be discussed once diagnosis is established.      Patient and wife had multiple questions, which were all answered.  Patient mentioned he wants time to think about bone marrow biopsy.  He has hesitation regarding bone marrow biopsy and treatment for myeloma.      Patient's PSA is elevated.  Scans reveal an enlarged prostate.  He may have prostate cancer also.  Bone lesions seen on the scans are likely from myeloma and not prostate cancer.      Overall, the patient says his condition is slightly better. He feels little stronger.  His labs revealed improvement on creatinine.  Pancytopenia is stable.      PHYSICAL EXAMINATION:   GENERAL:  He was alert and oriented x 3.   VITAL SIGNS:  Reviewed.     Rest of the system not examined.      LABORATORY DATA:  Reviewed.      ASSESSMENT:   1.  A 77-year-old gentleman with monoclonal gammopathy.  Most likely has multiple myeloma.   2.  Pancytopenia.  This can be explained from myeloma.   3.  Acute renal failure, improving.   4.  Elevated PSA with enlarged prostate suspicious for prostate cancer.      PLAN:   1.  Will wait for patient's decision regarding bone marrow biopsy.  Hopefully he agrees for bone marrow biopsy.  That will help us establish the diagnosis.  Without establishing a diagnosis, we will not be able to give him any treatment.   2. Patient has  pancytopenia.  This is likely from myeloma.  Will monitor CBC and transfuse as needed to keep hemoglobin above 7.0 and platelets above 10,000.   3.  Discussed regarding elevated PSA.  He could have prostate cancer.  I told them at this time I would focus on diagnosis and treatment for myeloma then prostate cancer.     4.  We will continue to follow him.  Discussed with Dr. Seguar.      Total time spent 35 minutes, more than 50% of the time spent in counseling and coordination of care.         ELIZABETH NEVILLE MD             D: 2018   T: 2018   MT: EBONI      Name:     ARCHIE ALDANA   MRN:      [id number]        Account:       [id number]   :      1940           Consult Date:  2018      Document: [id number]

## 2018-04-11 NOTE — CONSULTS
[facility]    Urology Consultation     Date of Admission:  4/9/2018    Assessment & Plan   Guille Aguilar is a 77 year old male who was admitted on 4/9/2018. I was asked to see the patient for urinary retention and concern for CaP with PSA >10 and bony lesions on CT.    Plan: Ideally would continue with rubio x 1-2 weeks to allow bladder healing given >2L was drained with placement.   Will need a repeat PSA as outpatient, cystoscopy for further hematuria evaluation, and possible prostate bx.   Follow up in urology office in 1-2 weeks to further address the above.     Francine Valdivia PA-C  [facility]  [address] [place] [id number]  [phone]  [email or web address]  Text Page (7am to 5pm)    Code Status    DNR/DNI    Reason for Consult   Reason for consult: I was asked by Dr. Segura to evaluate this patient for urinary retention and elevated PSA.    Primary Care Physician   Physician No Ref-Primary    Chief Complaint   Urinary retention, elevated PSA     History is obtained from the patient and wife    History of Present Illness   Guille Aguilar is a 77 year old male who was admitted with difficulty urinating and diarrhea x 5 weeks.       From admission H&P: Guille Aguilar is a 77 year old male who presents with 5 weeks of diarrhea and difficulty urinating. He notes unintentional 50 lb weight loss. He notes that he recently saw his cardiologist and had a heart workup that was negative. He reports he saw a hematologist and that he did not want to undergo much workup at that point. He did not get a bone marrow biopsy and did not follow up with a urologist. He states he has been feeling progressively weaker. No chest pain or shortness of breath. He started wearing diapers 2 weeks ago. He notes the weakness is an all over weakness that is not worse one place or another. He notes a severe neuropathy over the past few weeks, with tingling that has  involved some of his legs  Discussed with his wife, who state that he has been progressively weaker. He was having yellow loose bowel movements but no blood in the diapers she is changes, and no melena. She work in adult housing and specifically states no melena or black stools/. She confirms his DNR/I status.     Urology was consulted given his urinary retention, elevated PSA and bony lesions concerning for possible metastasis vs. Multiple myeloma. Oncology following and recommending bone bx. As stated above, pts PSA has been noted to be >10, he was referred to urology but apparently never followed through with this. Prostate is large on CT but cannot confirm cancer without a bx or MRI which would be obtained as an outpatient.      On admission, rubio catheter was placed with >2L of urine return with hematuria. Creat 21 on admission, now down to 2.99 today. Patient notes incontinence at home but it is unclear if he had noticed any hematuria or decreased stream as he is confused and collectively a poor historian. Currently denies abdominal or suprapubic tenderness, CVA tenderness, or irritation with rubio. Hgb stable at 7.7.     Past Medical History   I have reviewed this patient's medical history and updated it with pertinent information if needed.   Past Medical History:   Diagnosis Date     CAD (coronary artery disease)     CABG 2011: LIMA to LAD, SVG to OM, SVG Y-graft to posterolateral and PDA, cardiac cath 2011: BMS to OM, cath 2006: medicalmanagment     CKD (chronic kidney disease), stage III      Dental abscess      Essential hypertension, benign      Glaucoma 12/4/2009     High cholesterol 12/4/2009     Hyperlipidaemia      Left ventricular diastolic dysfunction      MGUS (monoclonal gammopathy of unknown significance)      Non Q wave myocardial infarction (H)     2006, 2011     Obesity, unspecified        Past Surgical History   I have reviewed this patient's surgical history and updated it with pertinent  information if needed.  Past Surgical History:   Procedure Laterality Date     CATARACT IOL, RT/LT      left     CORONARY ARTERY BYPASS  2011    CABG 2011: LIMA to LAD, SVG to OM, SVG Y-graft to posterolateral and PDA     HEART CATH, ANGIOPLASTY      2006 OM1, unsuccessful PCI-medical management       Prior to Admission Medications   Prior to Admission Medications   Prescriptions Last Dose Informant Patient Reported? Taking?   rosuvastatin (CRESTOR) 40 MG tablet 4/9/2018 at am Self No Yes   Sig: Take 1 tablet (40 mg) by mouth At Bedtime      Facility-Administered Medications: None     Allergies   Allergies   Allergen Reactions     Dust Mites      No Known Allergies        Social History   I have reviewed this patient's social history and updated it with pertinent information if needed. Guille Aguilar  reports that he has never smoked. He has never used smokeless tobacco. He reports that he does not drink alcohol or use illicit drugs.    Family History   I have reviewed this patient's family history and updated it with pertinent information if needed.   Family History   Problem Relation Age of Onset     Other - See Comments Mother 83     old age     Other - See Comments Father      fall       Review of Systems   The 10 point Review of Systems is negative other than noted in the HPI or here. Difficult to obtain an accurate ROS as patient is confused and seems to be a poor historian.     Physical Exam   Temp: 97.9  F (36.6  C) Temp src: Axillary BP: (!) 143/98 Pulse: 76 Heart Rate: 81 Resp: 18 SpO2: 96 % O2 Device: None (Room air)    Vital Signs with Ranges  Temp:  [97.9  F (36.6  C)-98.8  F (37.1  C)] 97.9  F (36.6  C)  Pulse:  [69-81] 76  Heart Rate:  [78-81] 81  Resp:  [15-18] 18  BP: ()/(61-98) 143/98  SpO2:  [93 %-96 %] 96 %  162 lbs .61 oz    Constitutional: Sitting up in bed, NAD. Notable confusion. Wife at bedside.   Eyes: no icterus  ENT: normocephalic, atraumatic   Respiratory: breathing unlabored    Cardiovascular: chest wall symmetric   GI: soft, NT, ND. No CVAT   Genitourinary: rubio in place and draining blood tinged urine.   Skin: well perfused   Neuropsychiatric: alert, confused     Data   Results for orders placed or performed during the hospital encounter of 04/09/18 (from the past 24 hour(s))   Glucose   Result Value Ref Range    Glucose 126 (H) 70 - 99 mg/dL   Glucose   Result Value Ref Range    Glucose 164 (H) 70 - 99 mg/dL   Glucose   Result Value Ref Range    Glucose 170 (H) 70 - 99 mg/dL   Glucose by meter   Result Value Ref Range    Glucose 172 (H) 70 - 99 mg/dL   Glucose by meter   Result Value Ref Range    Glucose 189 (H) 70 - 99 mg/dL   CBC with platelets differential   Result Value Ref Range    WBC 2.8 (L) 4.0 - 11.0 10e9/L    RBC Count 2.58 (L) 4.4 - 5.9 10e12/L    Hemoglobin 7.7 (L) 13.3 - 17.7 g/dL    Hematocrit 22.8 (L) 40.0 - 53.0 %    MCV 88 78 - 100 fl    MCH 29.8 26.5 - 33.0 pg    MCHC 33.8 31.5 - 36.5 g/dL    RDW 14.3 10.0 - 15.0 %    Platelet Count 63 (L) 150 - 450 10e9/L    Diff Method Automated Method     % Neutrophils 59.6 %    % Lymphocytes 31.6 %    % Monocytes 5.5 %    % Eosinophils 2.9 %    % Basophils 0.0 %    % Immature Granulocytes 0.4 %    Nucleated RBCs 0 0 /100    Absolute Neutrophil 1.6 1.6 - 8.3 10e9/L    Absolute Lymphocytes 0.9 0.8 - 5.3 10e9/L    Absolute Monocytes 0.2 0.0 - 1.3 10e9/L    Absolute Eosinophils 0.1 0.0 - 0.7 10e9/L    Absolute Basophils 0.0 0.0 - 0.2 10e9/L    Abs Immature Granulocytes 0.0 0 - 0.4 10e9/L    Absolute Nucleated RBC 0.0    Lipid panel reflex to direct LDL   Result Value Ref Range    Cholesterol 96 <200 mg/dL    Triglycerides 71 <150 mg/dL    HDL Cholesterol 36 (L) >39 mg/dL    LDL Cholesterol Calculated 46 <100 mg/dL    Non HDL Cholesterol 60 <130 mg/dL   Basic metabolic panel   Result Value Ref Range    Sodium 141 133 - 144 mmol/L    Potassium 3.1 (L) 3.4 - 5.3 mmol/L    Chloride 110 (H) 94 - 109 mmol/L    Carbon Dioxide 24 20 - 32  mmol/L    Anion Gap 7 3 - 14 mmol/L    Glucose 148 (H) 70 - 99 mg/dL    Urea Nitrogen 54 (H) 7 - 30 mg/dL    Creatinine 2.99 (H) 0.66 - 1.25 mg/dL    GFR Estimate 20 (L) >60 mL/min/1.7m2    GFR Estimate If Black 25 (L) >60 mL/min/1.7m2    Calcium 8.0 (L) 8.5 - 10.1 mg/dL   Ferritin   Result Value Ref Range    Ferritin 245 26 - 388 ng/mL   Iron and iron binding capacity   Result Value Ref Range    Iron 34 (L) 35 - 180 ug/dL    Iron Binding Cap 157 (L) 240 - 430 ug/dL    Iron Saturation Index 22 15 - 46 %   Vitamin B12   Result Value Ref Range    Vitamin B12 606 193 - 986 pg/mL   Folate   Result Value Ref Range    Folate 10.9 >5.4 ng/mL   Protein electrophoresis   Result Value Ref Range    Albumin Fraction PENDING 3.7 - 5.1 g/dL    Alpha 1 Fraction PENDING 0.2 - 0.4 g/dL    Alpha 2 Fraction PENDING 0.5 - 0.9 g/dL    Beta Fraction PENDING 0.6 - 1.0 g/dL    Gamma Fraction PENDING 0.7 - 1.6 g/dL    Monoclonal Peak PENDING 0.0 g/dL    ELP Interpretation: PENDING    Magnesium   Result Value Ref Range    Magnesium 1.7 1.6 - 2.3 mg/dL   Phosphorus   Result Value Ref Range    Phosphorus 3.7 2.5 - 4.5 mg/dL   Glucose   Result Value Ref Range    Glucose 134 (H) 70 - 99 mg/dL

## 2018-04-11 NOTE — PLAN OF CARE
Problem: Patient Care Overview  Goal: Plan of Care/Patient Progress Review  OT/PT: Order received and chart reviewed. Eval attempted; however, per discussion with RN, pt not appropriate to engage in OT/PT today. Per RN, plans for care conference today. Low HGB of 7.7 also noted.

## 2018-04-11 NOTE — PROGRESS NOTES
[facility]    Hospitalist Progress Note    Date of Service (when I saw the patient): 04/11/2018    Assessment & Plan   Guille Aguilar is a 77 year old male with hx of CAD s/p CABG, dCHF, CKD, suspected multiple myeloma, and non-compliance despite strong recommendations from various providers who presented on 4/9/2018 with diarrhea and generalized weakness, and was admitted for acute renal failure and new bone lesions concerning for progressive MM vs prostate metastases.    Acute renal failure on CKD stage III due to obstructive nephropathy/urinary retention, Improving  He was noted to have an elevated PSA >10 in Nov 2017, and was referred to Urology, but did not follow up. Presented on this admission with diarrhea, progressive generalized weakness, and unintentional weight loss >50 lb in last 6 months. Creatinine 21.8 on arrival from baseline 1.4 range. Likely multifactorial from diarrhea, multiple myeloma, and obstruction from urinary retention. Maya catheter was placed in the ED with return of >2L of urine. Nephrology has been involved in his care. His renal function has improved with IV fluids and Maya catheter placement. .  - Cr 2.99 today, will follow  - Continues on IV fluids   - Maya catheter in place    Bone lesions due to multiple myeloma vs metastatic disease  Most recently saw Dr. Tavarez at  Oncology in Nov 2017, but was lost to follow up. It was felt at that time that the patient probably had myeloma with anemia, thrombocytopenia, renal insufficiency, and monoclonal gammopathy (IgG lambda) seen on SPEP/UPEP and immunofixation. Further work-up including bone marrow biopsy and skeletal survey was recommended, but patient did not follow up. CT abd/pelvis on arrival (obtained for diarrhea) incidentally showed numerous bone lesions as well as an enlarged prostate. As noted above, his PSA was previously elevated to >10, but patient has not followed up. MRI c/t/l spine (4/10) showed  decreased T1 signal concerning for metastatic disease and acute to subacute compression fracture at T11-T12.  - Per care conference with patient and his wife today, goals are restorative, but he remains unsure about bone marrow biopsy and wants to know more about treatment. Dr. Tavarez to review with patient and wife.  - [facility] following, appreciate assistance    Pancytopenia due to suspected multiple myeloma  Patient has developed progressive pancytopenia since Nov 2017. No s/sx of bleeding or infection.  - wbc 2.8k, Hgb 7.7, Plt 63k (stable)  - Repeat CBC in AM    CAD, native heart, native vessels s/p 3-v CABG  [PTA: rosuvastatin 40 mg qhs] Tchol 96, HDL 36, LDL, 46 during admission  - Hold rosuvastatin and start atorvastatin 10 mg daily  - Not on BB due to history of orthostatic hypotension. Not on aspirin due to history of melena.    # Pain Assessment:  Current Pain Score 4/11/2018   Patient currently in pain? yecenia Han s pain level was assessed and he currently denies pain.      FEN: 2 gram sodium diet  DVT Prophylaxis: Pneumatic Compression Devices  Code Status: DNR/DNI    Disposition: Expected discharge pending ongoing improvement in renal function, bone marrow biopsy. Anticipate TCU as early as Friday    Lily Segura    Interval History   No events overnight. Denies pain or nausea. Had care conference with patient and wife today. Ultimate goal is to get stronger so that he can be more mobile. He does not seem ready for hospice, but remains resistant to bone marrow biopsy. Wants to know more about prognosis and treatment course for MM. Details discussed with Dr. Tavarez who will answer these questions later today  - continue IV fluids  - PT/OT    -Data reviewed today: I reviewed all new labs and imaging results over the last 24 hours. I personally reviewed no new images or EKGs today    Physical Exam   Temp: 97.9  F (36.6  C) Temp src: Axillary BP: (!) 143/98 Pulse: 76 Heart Rate: 81 Resp: 18  SpO2: 96 % O2 Device: None (Room air)    Vitals:    04/09/18 2100 04/10/18 0202 04/11/18 0500   Weight: 77.9 kg (171 lb 11.8 oz) 76.5 kg (168 lb 10.4 oz) 73.5 kg (162 lb 0.6 oz)     Vital Signs with Ranges  Temp:  [97.9  F (36.6  C)-98.8  F (37.1  C)] 97.9  F (36.6  C)  Pulse:  [69-81] 76  Heart Rate:  [78-81] 81  Resp:  [15-18] 18  BP: ()/(61-98) 143/98  SpO2:  [93 %-96 %] 96 %  I/O last 3 completed shifts:  In: 4809 [P.O.:2630; I.V.:2179]  Out: 5475 [Urine:5475]    Constitutional: Appears comfortable, NAD  Respiratory: Breathing non-labored. Lungs CTAB - no wheezes, crackles, or rhonchi  Cardiovascular: Heart RRR, no m/r/g. Trace pedal edema  GI: +BS, abd soft/NT  Skin/Integumen: No rash  Neuro: Alert and appropriate, BAPTISTE  Psych: Calm and cooperative    Medications     IV infusion builder WITH additives 75 mL/hr at 04/11/18 1025       sodium chloride (PF)  3 mL Intracatheter Q8H     Data     Recent Labs  Lab 04/11/18  1032 04/11/18  0631 04/10/18  2240  04/10/18  0650  04/09/18  2125 04/09/18 2006 04/09/18  1615 04/09/18  1410   WBC  --  2.8*  --   --  2.8*  --  3.6*  --   --   --  4.2   HGB  --  7.7*  --   --  7.7*  --  7.5*  --   --   --  8.4*   MCV  --  88  --   --  85  --  85  --   --   --  86   PLT  --  63*  --   --  69*  --  71*  --   --   --  82*   INR  --   --   --   --   --   --   --   --   --  1.44*  --    NA  --  141  --   --  141  --   --  137  < >  --  131*   POTASSIUM  --  3.1*  --   --  3.3*  --   --  4.5  < > 7.0* 7.5*   CHLORIDE  --  110*  --   --  107  --   --  102  < >  --  99   CO2  --  24  --   --  19*  --   --  16*  < >  --  17*   BUN  --  54*  --   --  122*  --   --  163*  < >  --  187*   CR  --  2.99*  --   --  10.10*  --   --  17.60*  < >  --  21.80*   ANIONGAP  --  7  --   --  15*  --   --  19*  < >  --  15*   RAKEL  --  8.0*  --   --  8.7  --   --  8.8  < >  --  8.9   * 148* 170*  < > 104*  < > 90 111*  < >  --  103*   ALBUMIN  --   --   --   --   --   --   --   --   --   --   2.2*   PROTTOTAL  --   --   --   --   --   --   --   --   --   --  8.3   BILITOTAL  --   --   --   --   --   --   --   --   --   --  0.2   ALKPHOS  --   --   --   --   --   --   --   --   --   --  71   ALT  --   --   --   --   --   --   --   --   --   --  22   AST  --   --   --   --   --   --   --   --   --   --  15   LIPASE  --   --   --   --   --   --   --   --   --   --  177   TROPI  --   --   --   --   --   --   --   --   --   --  0.034   < > = values in this interval not displayed.    No results found for this or any previous visit (from the past 24 hour(s)).

## 2018-04-11 NOTE — PLAN OF CARE
Problem: Patient Care Overview  Goal: Plan of Care/Patient Progress Review  Outcome: Improving  AVSS.  Continues to Deny pain. Repositioning every 2hrs. Maintaining Enteric precautions for C-diff rule-out. No BM overnight. Bottom red and excoriated,  Continued use of barrier cream. Maya patent; large amount of dark pink urine. Tolerating regular diet fairly; poor appt; ate icecream overnight.

## 2018-04-11 NOTE — PROGRESS NOTES
[facility]     Renal Progress Note       SHORTHAND KEY FOR MY NOTES:  c = with, s = without, p = after, a = before, x = except, asx = asymptomatic, tx = transplant or treatment, sx = symptoms or symptomatic, cx = canceled or culture, rxn = reaction, yday = yesterday, nl = normal, abx = antibiotics, fxn = function, dx = diagnosis, dz = disease, m/h = melena/hematochezia, c/d/l/ha = cramping/dizziness/lightheadedness/headache, d/c = discharge or diarrhea/constipation, f/c/n/v = fevers/chills/nausea/vomiting, cp/sob = chest pain/shortness of breath.         Assessment/Plan:     1.  NIKKI/CKD.  Pt's cr continues to improve and is ~3 today.  He still has quite a bit of uo via the rubio and is receiving hypotonic fluids. No uremic sx and he is much more alert today.  Volume status is good.  Chemistries are much better.  A.  Follow labs, uo, sx.  B.  Avoid nephrotoxics.    2.  Obstruction 2 enlarged prostate.  Urology saw him today and suggests outpt f/u for prostate bx.  He will need the rubio to remain in place for a couple of wks.    A.  Continue rubio.  B.  F/u c Urology.    3.  Possible MM.  Pt was seen by Dr. Tavarez.  He hasn't decided, yet, re doing a BMBx.    A.  Per Dr. Tavarez.    4.  Panyctopenia. He still has some hematuria, but hb is stable.  Plts and WBC remain low.  A.  Follow labs, clinically.    5.  Met acidosis.  Pt's acidosis has essentially resolved.  Bicarb removed from IVF.  A.  Follow labs.    6.  FEN.  K remains low.    A.  Start K protocol.  B.  Check mg and replace prn.        Interval History:     Pt is feeling ok.  He is still not eating much, but better.  Still having a good amt of urine.  He hasn't made a decision, yet, about a BMBx.  He still has neuropathy in his legs/feet.          Medications and Allergies:       atorvastatin  10 mg Oral Daily     sodium chloride (PF)  3 mL Intracatheter Q8H      Allergies   Allergen Reactions     Dust Mites      No Known Allergies            "Physical Exam:     Vitals were reviewed    Heart Rate: 79, Blood pressure 114/75, pulse 80, temperature 98  F (36.7  C), temperature source Oral, resp. rate 18, height 1.88 m (6' 2\"), weight 73.5 kg (162 lb 0.6 oz), SpO2 93 %.  Wt Readings from Last 3 Encounters:   04/11/18 73.5 kg (162 lb 0.6 oz)   02/23/18 80.1 kg (176 lb 8 oz)   11/20/17 87.5 kg (193 lb)     Intake/Output Summary (Last 24 hours) at 04/11/18 2026  Last data filed at 04/11/18 1851   Gross per 24 hour   Intake             4109 ml   Output             4425 ml   Net             -316 ml     GENERAL APPEARANCE: pleasant, NAD, more alert and interactive  HEENT:  Eyes/ears/nose/neck grossly normal  RESP: lungs cta b c good efforts, no crackles  CV: RRR, nl S1/S2  ABDOMEN: o/s/nt/nd  EXTREMITIES/SKIN: tr ble edema  OTHER: + rubio c reddish urine         Data:     CBC RESULTS:    Recent Labs  Lab 04/11/18  0631 04/10/18  0650 04/09/18  2125 04/09/18  1410   WBC 2.8* 2.8* 3.6* 4.2   RBC 2.58* 2.61* 2.51* 2.84*   HGB 7.7* 7.7* 7.5* 8.4*   HCT 22.8* 22.3* 21.4* 24.5*   PLT 63* 69* 71* 82*     Basic Metabolic Panel:    Recent Labs  Lab 04/11/18  1500 04/11/18  1032 04/11/18  0631 04/10/18  2240 04/10/18  1829 04/10/18  1415  04/10/18  0650  04/09/18  2006 04/09/18  1705 04/09/18  1615 04/09/18  1410   NA  --   --  141  --   --   --   --  141  --  137 134  --  131*   POTASSIUM  --   --  3.1*  --   --   --   --  3.3*  --  4.5 6.0* 7.0* 7.5*   CHLORIDE  --   --  110*  --   --   --   --  107  --  102 99  --  99   CO2  --   --  24  --   --   --   --  19*  --  16* 13*  --  17*   BUN  --   --  54*  --   --   --   --  122*  --  163* 182*  --  187*   CR  --   --  2.99*  --   --   --   --  10.10*  --  17.60* 19.30*  --  21.80*   * 134* 148* 170* 164* 126*  < > 104*  < > 111* 156*  --  103*   RAKEL  --   --  8.0*  --   --   --   --  8.7  --  8.8 9.1  --  8.9   < > = values in this interval not displayed.  INR    Recent Labs  Lab 04/09/18  1615   INR 1.44*    "   Attestation:   I have reviewed today's relevant vital signs, notes, medications, labs and imaging.    Gregorio Lutz MD  [facility] - Nephrology  [phone]

## 2018-04-11 NOTE — PROVIDER NOTIFICATION
Paged On call nephrologist to clarify orders for maintence fluids and BMP mentioned in Dr. Lutz's note. Advised to leave fluids unchanged and ensure BMP in the morning.

## 2018-04-12 NOTE — PROGRESS NOTES
SPIRITUAL HEALTH SERVICES Progress Note  [facility]     visited pt on request. Pt was alert and welcomed visit. 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 Pt's spouse Cesilia joined us near the end of the visit. She worked until she was 74 in as a senior  which she described as [employer]. Pt and spouse say they are well supported bu family and gayla community. The couple have 2 adult children who live out of state. Pt reflected on his medical condition and the changes that it has brought into the couple's lives including changing their senior living hopes. Pt became tearful when he described a person who came to [facility] by reading a book the pt wrote. Pt says this is all for the glory of God. Pt and spouse share that they are facing difficult decisions and need God's wisdom. SH provided generous reflective listening and support. Pt and spouse welcomed prayer.  will follow per GINA.     Shailesh Nunes M.Div.  [facility] Resident  [phone] Pager

## 2018-04-12 NOTE — PROGRESS NOTES
[facility]    Hospitalist Progress Note    Date of Service (when I saw the patient): 04/12/2018    Assessment & Plan   Guille Aguilar is a 77 year old male with hx of CAD s/p CABG, dCHF, CKD, suspected multiple myeloma, and non-compliance despite strong recommendations from various providers who presented on 4/9/2018 with diarrhea and generalized weakness, and was admitted for acute renal failure and new bone lesions concerning for progressive multiple myeloma. He also likely has prostate cancer.     Acute renal failure on CKD stage III due to obstructive nephropathy/urinary retention, Improving  He was noted to have an elevated PSA >10 in Nov 2017, and was referred to Urology, but did not follow up. Presented on this admission with diarrhea, progressive generalized weakness, and unintentional weight loss >50 lb in last 6 months. Creatinine 21.8 on arrival from baseline 1.4 range. Likely multifactorial from diarrhea, multiple myeloma, and obstruction from urinary retention. Rubio catheter was placed in the ED with return of >2L of urine. Nephrology has been involved in his care. His renal function has continued to improve with IV fluids and Rubio catheter placement. .  - Cr 1.45 today, will follow  - Continues on IV fluids   - Rubio catheter in place. Follow up with Urology in 1-2 weeks for further rubio management    Bone lesions due to multiple myeloma vs metastatic disease  Possible prostate cancer    Most recently saw Dr. Tavarez at  Oncology in Nov 2017, but was lost to follow up. It was felt at that time that the patient probably had myeloma with anemia, thrombocytopenia, renal insufficiency, and monoclonal gammopathy (IgG lambda) seen on SPEP/UPEP and immunofixation. Further work-up including bone marrow biopsy and skeletal survey was recommended, but patient did not follow up. CT abd/pelvis on arrival (obtained for diarrhea) incidentally showed numerous bone lesions as well as an enlarged  prostate. As noted above, his PSA was previously elevated to >10, but patient has not followed up. MRI c/t/l spine (4/10) showed decreased T1 signal concerning for metastatic disease and acute to subacute compression fracture at T11-T12. Dr. Tavarez has discussed these findings with the patient, and has recommended pursuing diagnosis/treatment of MM first and then prostate cancer.   Had care conference with patient and his wife on 4/11. The patient is very weak and deconditioned and wants to pursue trial of TCU, and attempt to get stronger before he pursues bone marrow biopsy. He remains DNR/DNI, but goals otherwise remain restorative  - Dr. Tavarez to arrange outpatient follow up with [facility]  - Follow up with Urology in 1-2 weeks    Pancytopenia due to suspected multiple myeloma, Stable  Patient has developed progressive pancytopenia since Nov 2017. No s/sx of bleeding or infection. His CBC has been more or less stable throughout his hospital stay.    CAD, native heart, native vessels s/p 3-v CABG  [PTA: rosuvastatin 40 mg qhs] Tchol 96, HDL 36, LDL, 46 during admission  - Rosuvastatin was discontinued in favor of atorvastatin 10 mg daily  - Not on BB due to history of orthostatic hypotension. Not on aspirin due to history of melena    Elevated troponin due to demand ischemia  RRT 4/12 for hypotension. Serial troponins were obtained - minimally elevated to 0.08 and flat. Suspect demand ischemia due to hypotension in setting of renal failure.    GERD  Started on ranitidine during his hospital stay for severe heartburn.   - Continues on ranitidine 150 mg daily, TUMS prn    Non-severe malnutrition in context of chronic illness  On Ensure supplements BID as per Nutrition    # Pain Assessment:  Current Pain Score 4/12/2018   Patient currently in pain? yes   Pain score (0-10) -   Pain location -   Pain descriptors -   Guille s pain level was assessed and he currently denies pain.      FEN: 2 gram sodium diet  DVT  Prophylaxis: Pneumatic Compression Devices  Code Status: DNR/DNI    Disposition: Expected discharge to TCU pending ongoing improvement in renal function, bone marrow biopsy. Anticipate TCU as early as Friday    Lily Segura    Interval History   RRT called early this morning for acute abdominal pain/heart burn and hypotension. BP has rebounded nicely with IV fluids and abdominal pain is improving since receiving Pepcid and TUMS. Discussed bone marrow biopsy again: wants to do trial of TCU and see if he can get more strength and energy prior to bone marrow biopsy. Discussed with Dr. Tavarez.  - Increase IV fluid rate  - Start discharge planning    -Data reviewed today: I reviewed all new labs and imaging results over the last 24 hours. I personally reviewed no new images or EKGs today    Physical Exam   Temp: 98.8  F (37.1  C) Temp src: Oral BP: 111/89 Pulse: 87 Heart Rate: 84 Resp: 16 SpO2: 93 % O2 Device: None (Room air)    Vitals:    04/10/18 0202 04/11/18 0500 04/12/18 0516   Weight: 76.5 kg (168 lb 10.4 oz) 73.5 kg (162 lb 0.6 oz) 73.5 kg (162 lb 0.6 oz)     Vital Signs with Ranges  Temp:  [98  F (36.7  C)-99  F (37.2  C)] 98.8  F (37.1  C)  Pulse:  [76-87] 87  Heart Rate:  [79-89] 84  Resp:  [16-18] 16  BP: ()/(52-98) 111/89  SpO2:  [91 %-96 %] 93 %  I/O last 3 completed shifts:  In: 1531.25 [P.O.:1430; I.V.:101.25]  Out: 2975 [Urine:2975]    Constitutional: Appears comfortable, NAD  Respiratory: Breathing non-labored. Lungs CTAB - no wheezes, crackles, or rhonchi  Cardiovascular: Heart RRR, no m/r/g. Trace pedal edema  GI: +BS, abd soft/NT  Skin/Integumen: No rash  Neuro: Alert and appropriate, BAPTISTE  Psych: Calm and cooperative    Medications     0.45% sodium chloride + KCl 20 mEq/L 100 mL/hr at 04/12/18 0855       sodium chloride 0.9%  500 mL Intravenous Once     [START ON 4/13/2018] ranitidine  150 mg Oral Daily     atorvastatin  10 mg Oral Daily     sodium chloride (PF)  3 mL Intracatheter Q8H      Data     Recent Labs  Lab 04/12/18  0540 04/12/18  0150 04/11/18  2105  04/11/18  0631  04/10/18  0650  04/09/18  1615 04/09/18  1410   WBC 3.2*  --   --   --  2.8*  --  2.8*  < >  --  4.2   HGB 8.2*  --   --   --  7.7*  --  7.7*  < >  --  8.4*   MCV 88  --   --   --  88  --  85  < >  --  86   PLT 56*  --   --   --  63*  --  69*  < >  --  82*   INR 1.35*  --   --   --   --   --   --   --  1.44*  --      --   --   --  141  --  141  < >  --  131*   POTASSIUM 3.5  --  4.0  --  3.1*  --  3.3*  < > 7.0* 7.5*   CHLORIDE 107  --   --   --  110*  --  107  < >  --  99   CO2 23  --   --   --  24  --  19*  < >  --  17*   BUN 30  --   --   --  54*  --  122*  < >  --  187*   CR 1.45*  --   --   --  2.99*  --  10.10*  < >  --  21.80*   ANIONGAP 7  --   --   --  7  --  15*  < >  --  15*   RAKEL 7.6*  --   --   --  8.0*  --  8.7  < >  --  8.9   * 120* 114*  < > 148*  < > 104*  < >  --  103*   ALBUMIN 1.6*  --   --   --   --   --   --   --   --  2.2*   PROTTOTAL 7.2  --   --   --   --   --   --   --   --  8.3   BILITOTAL 0.3  --   --   --   --   --   --   --   --  0.2   ALKPHOS 59  --   --   --   --   --   --   --   --  71   ALT 43  --   --   --   --   --   --   --   --  22   AST 39  --   --   --   --   --   --   --   --  15   LIPASE 487*  --   --   --   --   --   --   --   --  177   TROPI 0.087*  --   --   --   --   --   --   --   --  0.034   < > = values in this interval not displayed.    Recent Results (from the past 24 hour(s))   XR Abdomen Port 1 View    Narrative    XR ABDOMEN PORT 1 VW 4/12/2018 6:23 AM    COMPARISON: CT dated 4/9/2018    HISTORY: Abdominal pain and nausea.      Impression    IMPRESSION: Distended gas-filled stomach, appears to have progressed  since 4/9/2018, concerning for developing proximal obstruction.    DIANDRA PACE MD

## 2018-04-12 NOTE — PROGRESS NOTES
"[facility]     Renal Progress Note       SHORTHAND KEY FOR MY NOTES:  c = with, s = without, p = after, a = before, x = except, asx = asymptomatic, tx = transplant or treatment, sx = symptoms or symptomatic, cx = canceled or culture, rxn = reaction, yday = yesterday, nl = normal, abx = antibiotics, fxn = function, dx = diagnosis, dz = disease, m/h = melena/hematochezia, c/d/l/ha = cramping/dizziness/lightheadedness/headache, d/c = discharge or diarrhea/constipation, f/c/n/v = fevers/chills/nausea/vomiting, cp/sob = chest pain/shortness of breath.         Assessment/Plan:     1.  NIKKI/CKD.  Pt's cr is near baseline.  Good uo via rubio - post-obst diuresis.  He is still getting hypotonic IVF, but with the low BP earlier, we will change to NS + 20K.  A.  Follow labs, uo, sx.  B.  Monitor labs.  C.  Ensure he is able to keep up c fluids on his own when IVF are stopped before d/c.  He still has post-obst diuresis.      2.  Obstruction 2 enlarged prostate.  Urology f/u as outpt.  He will keep the rubio in place.  A.  Per Urology.    3.  Possible MM.  Pt is going to wait on the BMBx.  A.  F/u c Dr. Tavarez.    4.  Panyctopenia. Hb and wbc are stable, but plts continue to drift downward.    A.  Follow labs, clinically.    5.  FEN.  K remains on the low side.  Mg and phos were ok.  He is not a K restricted diet.  A.  Continue K protocol.    Thank you for this consultation.  I will sign off.  Please call if any questions.        Interval History:     Pt is feeling ok now, but earlier had an RRT called.  He had \"bad heart burn, almost like when I had a heart attack.\"  His BP was low and he rec'd a 500 cc bolus of NS.      Good uo via rubio.  He has decided to wait on the BMBx until he is a bit stronger.           Medications and Allergies:       sodium chloride 0.9%  500 mL Intravenous Once     [START ON 4/13/2018] ranitidine  150 mg Oral Daily     atorvastatin  10 mg Oral Daily     sodium chloride (PF)  3 mL " "Intracatheter Q8H      Allergies   Allergen Reactions     Dust Mites      No Known Allergies           Physical Exam:     Vitals were reviewed    Heart Rate: 87, Blood pressure 100/61, pulse 87, temperature 96.9  F (36.1  C), temperature source Oral, resp. rate 16, height 1.88 m (6' 2\"), weight 73.5 kg (162 lb 0.6 oz), SpO2 93 %.  Wt Readings from Last 3 Encounters:   04/12/18 73.5 kg (162 lb 0.6 oz)   02/23/18 80.1 kg (176 lb 8 oz)   11/20/17 87.5 kg (193 lb)     Intake/Output Summary (Last 24 hours) at 04/12/18 1408  Last data filed at 04/12/18 1148   Gross per 24 hour   Intake          3539.25 ml   Output             3150 ml   Net           389.25 ml     GENERAL APPEARANCE: pleasant, NAD, alert and interactive  HEENT:  Eyes/ears/nose/neck grossly normal  CV:  RRR  PULM:  Lungs fairly CTA B  ABD:  O/s/nt/nd  OTHER: + rubio c reddish urine         Data:     CBC RESULTS:    Recent Labs  Lab 04/12/18  0540 04/11/18  0631 04/10/18  0650 04/09/18  2125 04/09/18  1410   WBC 3.2* 2.8* 2.8* 3.6* 4.2   RBC 2.80* 2.58* 2.61* 2.51* 2.84*   HGB 8.2* 7.7* 7.7* 7.5* 8.4*   HCT 24.7* 22.8* 22.3* 21.4* 24.5*   PLT 56* 63* 69* 71* 82*     Basic Metabolic Panel:    Recent Labs  Lab 04/12/18  1007 04/12/18  0540 04/12/18  0150 04/11/18  2105 04/11/18  1815 04/11/18  1500  04/11/18  0631  04/10/18  0650  04/09/18  2006 04/09/18  1705  04/09/18  1410   NA  --  137  --   --   --   --   --  141  --  141  --  137 134  --  131*   POTASSIUM  --  3.5  --  4.0  --   --   --  3.1*  --  3.3*  --  4.5 6.0*  < > 7.5*   CHLORIDE  --  107  --   --   --   --   --  110*  --  107  --  102 99  --  99   CO2  --  23  --   --   --   --   --  24  --  19*  --  16* 13*  --  17*   BUN  --  30  --   --   --   --   --  54*  --  122*  --  163* 182*  --  187*   CR  --  1.45*  --   --   --   --   --  2.99*  --  10.10*  --  17.60* 19.30*  --  21.80*   * 119* 120* 114* 124* 173*  < > 148*  < > 104*  < > 111* 156*  --  103*   RAKEL  --  7.6*  --   --   --   --  "  --  8.0*  --  8.7  --  8.8 9.1  --  8.9   < > = values in this interval not displayed.  INR    Recent Labs  Lab 04/12/18  0540 04/09/18  1615   INR 1.35* 1.44*      Attestation:   I have reviewed today's relevant vital signs, notes, medications, labs and imaging.    Gregorio Lutz MD  [facility] Nephrology  [phone]

## 2018-04-12 NOTE — CODE/RAPID RESPONSE
[facility]    RRT Note  4/12/2018   Time Called: 0530    RRT called for: Hypotension    Assessment & Plan   IMPRESSION & PLAN:  Hypotension 84/40 with decreased mentation nausea and abdominal pain/heart burn  I/O suggest nearly 9 L negative since admission w/ likely etiology being hypovolemia, urine output is copious, no clinical signs of volume overload    INTERVENTIONS:  1 L NS bolus--> BP improved to 104/70, 123/75, 129/86  Check a.m. labs stat CBC electrolytes troponin coags, LFTs, lipase  ekg  Pepcid daily starting now  tums once now   Dulcolax MI daily starting now, no BM for more than 2 days  abd X-ray rule out perforation  Having some relief with flatus and burping  Encourage mobility, PT     Labs reviewed, lipase incr, ?etiol,thrombocytopenia 56K, incr trop 0.087 but w/o ischemic changes on ekg/NIKKI, unlikely ACS    defer further cares to rounding hospitalist    Interval History     Guille Aguilar is a 77 year old male who was admitted on 4/9/2018 for admitted for acute renal failure and new bone lesions concerning for progressive MM vs prostate metastases.    Medical history significant for:   Past Medical History:   Diagnosis Date     CAD (coronary artery disease)     CABG 2011: LIMA to LAD, SVG to OM, SVG Y-graft to posterolateral and PDA, cardiac cath 2011: BMS to OM, cath 2006: medicalmanagment     CKD (chronic kidney disease), stage III      Dental abscess      Essential hypertension, benign      Glaucoma 12/4/2009     High cholesterol 12/4/2009     Hyperlipidaemia      Left ventricular diastolic dysfunction      MGUS (monoclonal gammopathy of unknown significance)      Non Q wave myocardial infarction (H)     2006, 2011     Obesity, unspecified          Code Status: DNR/DNI    Nicole Lugo    Allergies   Allergies   Allergen Reactions     Dust Mites      No Known Allergies        Physical Exam   Vital Signs with Ranges:  Temp:  [97.9  F (36.6  C)-99  F (37.2  C)] 99  F (37.2   C)  Pulse:  [69-86] 86  Heart Rate:  [79-89] 84  Resp:  [16-18] 16  BP: ()/(56-98) 129/86  SpO2:  [91 %-96 %] 95 %  I/O last 3 completed shifts:  In: 3206.25 [P.O.:1930; I.V.:1276.25]  Out: 4725 [Urine:4725]    Constitutional:no acute distress  ENT: Deferred  Neck: supple  Pulmonary: CTAB upper & lower lobes  Cardiovascular: S1S2 low normal blood pressures improving his fluid bolus  GI: NABS/s/ tender in upper quadrants/ND  Skin/Integumen: No mottling no edema  Neuro: Was commenced to show 2 fingers and wiggle feet bilaterally  Psych: Deferred  Extremities: Right medial malleolar skin lesion    Data     EKG:  Interpreted by Nicole Lugo  Time reviewed: 0530   Symptoms at time of EKG: Hypotension  Rhythm: normal sinus   Rate: Normal  Axis: Normal  Ectopy: none  Conduction: right bundle branch block (complete)  ST Segments/ T Waves: No ST-T wave changes  Q Waves: inferior leads  Comparison to prior: Unchanged from prior study on 4/9/2018    Clinical Impression: Normal sinus rhythm no acute ischemic changes or bundle branch block old inferior Q waves      Troponin:    Recent Labs   Lab Test  04/12/18   0540   TROPI  0.087*       IMAGING: (X-ray/CT/MRI)   No results found for this or any previous visit (from the past 24 hour(s)).    CBC with Diff:  Recent Labs   Lab Test  04/12/18   0540   WBC  3.2*   HGB  8.2*   MCV  88   PLT  56*   INR  1.35*        Lactic Acid:    Lab Results   Component Value Date    LACT 1.1 04/12/2018           Comprehensive Metabolic Panel:    Recent Labs  Lab 04/12/18  0540  04/11/18  0631     --  141   POTASSIUM 3.5  < > 3.1*   CHLORIDE 107  --  110*   CO2 23  --  24   ANIONGAP 7  --  7   *  < > 148*   BUN 30  --  54*   CR 1.45*  --  2.99*   GFRESTIMATED 47*  --  20*   GFRESTBLACK 57*  --  25*   RAKEL 7.6*  --  8.0*   MAG  --   --  1.7   PHOS  --   --  3.7   PROTTOTAL 7.2  --   --    ALBUMIN 1.6*  --   --    BILITOTAL 0.3  --   --    ALKPHOS 59  --   --    AST 39  --   --     ALT 43  --   --    < > = values in this interval not displayed.    INR:    Recent Labs   Lab Test  04/12/18   0540   INR  1.35*       UA:    Recent Labs  Lab 04/10/18  0604   COLOR Yellow   APPEARANCE Slightly Cloudy   URINEGLC Negative   URINEBILI Negative   URINEKETONE Negative   SG 1.013   UBLD Large*   URINEPH 6.5   PROTEIN 100*   NITRITE Negative   LEUKEST Trace*   RBCU >182*   WBCU 7*       Time Spent on this Encounter   I spent 30 minutes on the unit/floor from 5:30-6 AM managing the care of Guille Aguilar. Over 50% of my time was spent counseling the patient and/or coordinating care regarding services listed in this note.

## 2018-04-12 NOTE — PLAN OF CARE
Problem: Patient Care Overview  Goal: Plan of Care/Patient Progress Review  Outcome: No Change  Pt arrived to floor at 1200 from 33. AOx4, forgetful. BP soft, other VSS on RA. No c/o pain. LS: clear. Coccyx sore: unable to assess, pt refused. 2gm NA diet, tolerating well. Maya, marc output, adequate. R  NS+20K@100, L PIV SL. Up with A1, GB and walker. Creat: 1.45, Ma.4, replaced, labs entered. Urology: signed off, PT/OT, Hem/onc consulted. Continue to monitor.

## 2018-04-12 NOTE — PROVIDER NOTIFICATION
MD Notification    Notified Person: MD      Notified Person Name: MD Xena    Notification Date/Time: 4/12/2018 5:20 AM     Notification Interaction: Spoke to physician    Purpose of Notification: Pt C/o nausea, no PRN's     Orders Received:    Comments:

## 2018-04-12 NOTE — PLAN OF CARE
Problem: Patient Care Overview  Goal: Plan of Care/Patient Progress Review  OT: Eval complete and Tx initiated. Pt admitted for acute renal failure and bone lesions due to possible multiple myeloma. Prior to admit, pt lives in house with wife and reports I with ADLs and med mgmt.     Discharge Planner OT   Patient plan for discharge: TCU    Current status: Pt required min A with FWW and DME for toilet transfer. Pt completed LE dressing with min-mod A. Pt stood at sink for hygiene task with min A. Hypotensive response to activity with pt complaining of fatigue.    Barriers to return to prior living situation: Stairs to enter and within home; Bathtub; Fall risk with history; Safety concerns with lack of insight    Recommendations for discharge: TCU    Rationale for recommendations: Pt limited by impaired cognition, safety, balance, and activity tolerance; thus, OT will proceed with daily intervention. Pt in agreement with TCU at discharge.        Entered by: Jeffy Rudd 04/12/2018 9:00 AM

## 2018-04-12 NOTE — PROVIDER NOTIFICATION
MD Notification:    Person notified:  Hospitalist    Person Name:  Dr. Segura Lily    Date/Time: 4/12/18 at 0538    Interaction:text pager     Purpose of Notification:troponin continues to be elevated 0.075, however trending down.     Orders Received: No new orders given.     Comments:

## 2018-04-12 NOTE — PROGRESS NOTES
04/12/18 0807   Quick Adds   Type of Visit Initial Occupational Therapy Evaluation   Living Environment   Lives With spouse   Living Arrangements house   Home Accessibility stairs to enter home;stairs within home;tub/shower is not walk in   Number of Stairs to Enter Home 3   Number of Stairs Within Home 14   Transportation Available car;family or friend will provide  (Pt no longer drives. Family provides. )   Self-Care   Dominant Hand right   Usual Activity Tolerance moderate   Current Activity Tolerance fair   Equipment Currently Used at Home none   Functional Level Prior   Ambulation 0-->independent   Transferring 0-->independent   Toileting 0-->independent   Bathing 0-->independent   Dressing 0-->independent   Eating 0-->independent   Communication 0-->understands/communicates without difficulty   Swallowing 0-->swallows foods/liquids without difficulty   Cognition 0 - no cognition issues reported   Fall history within last six months yes   Number of times patient has fallen within last six months 2   General Information   Onset of Illness/Injury or Date of Surgery - Date 04/09/18   Referring Physician AMERICA Serna DO   Patient/Family Goals Statement Pt plans to discharge to rehab   Additional Occupational Profile Info/Pertinent History of Current Problem Admitted for acute renal failure and bone lesions due to possible multiple myeloma   Precautions/Limitations fall precautions   Cognitive Status Examination   Orientation orientation to person, place and time   Level of Consciousness alert;confused   Able to Follow Commands WNL/WFL   Personal Safety (Cognitive) at risk behaviors demonstrated;decreased insight to deficits   Memory impaired   Pain Assessment   Patient Currently in Pain No   Range of Motion (ROM)   ROM Quick Adds Other (describe)   ROM Comment B UE WNL   Mobility   Bed Mobility Bed mobility skill: Rolling/Turning;Bed mobility skill: Scooting/Bridging;Bed mobility skill: Sit to supine;Bed  mobility skill: Supine to sit;Bed mobility analysis   Bed Mobility Skill: Rolling/Turning   Level of [place] - Bed Mobility Skill Rolling Turning stand-by assist   Bed Mobility Skill: Scooting/Bridging   Level of [place]: Scooting/Bridging stand-by assist   Bed Mobility Skill: Sit to Supine   Level of [place]: Sit/Supine stand-by assist   Bed Mobility Skill: Supine to Sit   Level of [place]: Supine/Sit stand-by assist   Bed Mobility Analysis   Bed Mobility Limitations cognitive deficits   Impairments Contributing to Impaired Bed Mobility impaired balance   Transfer Skills   Transfer Transfer Safety Analysis Bed/Chair;Transfer Skill: Stand to Sit;Transfer Safety Analysis Sit/Stand   Transfer Skill: Bed to Chair/Chair to Bed   Level of [place]: Bed to Chair minimum assist (75% patients effort)   Assistive Device - Transfer Skill Bed to Chair Chair to Bed Rehab Eval standard walker   Transfer Safety Analysis Bed/Chair   Transfer Safety Concerns Noted decreased balance during turns;losing balance backward   Impairments Contributing to Impaired Transfers impaired balance   Transfer Skill: Sit to Stand   Level of [place]: Sit/Stand minimum assist (75% patients effort)   Assistive Device for Transfer: Sit/Stand standard walker   Transfer Safety Analysis Sit/Stand   Transfer Safety Concerns Noted: Sit/Stand decreased balance during turns;losing balance backward   Impaired Transfers: Sit/Stand impaired balance   Toilet Transfer   Toilet Transfer Toilet Transfer Skill;Toilet Transfer Safety Analysis   Transfer Skill: Toilet Transfer   Level of [place]: Toilet minimum assist (75% patients effort)   Assistive Device standard walker;seat riser;grab bars   Transfer Safety Analysis Toilet   Transfer Safety Concerns Noted: Toilet decreased balance during turns;losing balance backward   Transfer Safety Analysis Toilet impaired balance   Upper Body Dressing   Level of [place]: Dress Upper Body  "stand-by assist   Lower Body Dressing   Level of [place]: Dress Lower Body moderate assist (50% patients effort)   Toileting   Level of [place]: Toilet minimum assist (75% patients effort)   Grooming   Level of [place]: Grooming minimum assist (75% patients effort)   Eating/Self Feeding   Level of [place]: Eating independent   Instrumental Activities of Daily Living (IADL)   Previous Responsibilities medication management   Activities of Daily Living Analysis   Impairments Contributing to Impaired Activities of Daily Living balance impaired;cognition impaired   General Therapy Interventions   Planned Therapy Interventions ADL retraining;cognition;transfer training   Clinical Impression   Criteria for Skilled Therapeutic Interventions Met yes, treatment indicated   OT Diagnosis Decreased I and safety with ADLs   Influenced by the following impairments Impaired cognition and safety; Decreased balance and activity tolerance   Assessment of Occupational Performance 3-5 Performance Deficits   Identified Performance Deficits Dressing; Toileting; Bathing; IADLs   Clinical Decision Making (Complexity) Moderate complexity   Therapy Frequency daily   Predicted Duration of Therapy Intervention (days/wks) 3 days   Anticipated Discharge Disposition Transitional Care Facility   Risks and Benefits of Treatment have been explained. Yes   Patient, Family & other staff in agreement with plan of care Yes   [facility] TM \"6 Clicks\"   2016, Trustees of [facility], under license to [employer].  All rights reserved.   6 Clicks Short Forms Daily Activity Inpatient Short Form   [facility]  \"6 Clicks\" Daily Activity Inpatient Short Form   1. Putting on and taking off regular lower body clothing? 2 - A Lot   2. Bathing (including washing, rinsing, drying)? 2 - A Lot   3. Toileting, which includes using toilet, bedpan or urinal? 2 - A Lot   4. Putting on and taking off regular upper body " clothing? 3 - A Little   5. Taking care of personal grooming such as brushing teeth? 3 - A Little   6. Eating meals? 4 - None   Daily Activity Raw Score (Score out of 24.Lower scores equate to lower levels of function) 16   Total Evaluation Time   Total Evaluation Time (Minutes) 10

## 2018-04-12 NOTE — PROGRESS NOTES
CLINICAL NUTRITION SERVICES - REASSESSMENT NOTE      Malnutrition: % Weight Loss:  > 20% in 1 year (severe malnutrition)  % Intake:  <75% for >/= 3 months (non-severe malnutrition)  Subcutaneous Fat Loss:  None observed  Muscle Loss:  None observed  Fluid Retention:  None noted     Malnutrition Diagnosis: Non-Severe malnutrition  In Context of:  Chronic illness or disease        EVALUATION OF PROGRESS TOWARD GOALS   Diet:  2 gm Na. Sending Ensure between meals.  Good appetite, loves the Ensure. Eating 100% at meals.    Previous Goals:   Pt will consume at least 50% of meals and supplements  Evaluation: Met    Previous Nutrition Diagnosis:   Inadequate oral intake related to decreased appetite as evidenced by 23% wt loss x 1 yr  Evaluation: Improving      CURRENT NUTRITION DIAGNOSIS  No nutrition diagnosis identified at this time     INTERVENTIONS  Recommendations / Nutrition Prescription  Continue current diet and supplements    Implementation  General/healthful diet - encouraged good intake    Goals  Pt will continue to consume at least 75% of meals      MONITORING AND EVALUATION:  Progress towards goals will be monitored and evaluated per protocol and Practice Guidelines    Huong Snider RD  Pager [phone] (M-F)            [phone] (W/E & Hol)

## 2018-04-12 NOTE — PLAN OF CARE
Problem: Patient Care Overview  Goal: Plan of Care/Patient Progress Review  Outcome: Improving  Pt transferred to station 88 room 16, denies pain, transferred to  with assist of 2 without difficulty, urine less red than earlier, pink in color now. Report given to 88 RN, plan to rep[lace and and eventually go to TCU.

## 2018-04-12 NOTE — CONSULTS
PROGRESS NOTE: 04/12/2018      SUBJECTIVE:    Mr. Aguilar is a 77-year-old gentleman with monoclonal gammopathy.  I have previously seen him in the clinic in 11/2017.  Investigation including bone marrow biopsy and skeletal survey were scheduled.  Patient had cancelled it .  Patient now has been admitted to the hospital because of diarrhea and weakness.  Labs on admission revealed highly elevated creatinine mainly from dehydration.  It has been improving.  He is also cytopenic.      I had met with the patient and wife.  Bone marrow biopsy was recommended to establish a diagnosis of myeloma.  Patient wanted to think about it.      I met with the patient today. Patient says that he is feeling slightly better.  He is getting stronger.      Patient mentioned that at this time he has decided to wait on the bone marrow.  He wants to get stronger before he wants to have bone marrow biopsy.  He does understand that his myeloma will continue to get worse without any treatment, but he wants to wait until he is stronger.      LABORATORY:  Reviewed.      ASSESSMENT:   1.  A 77-year-old gentleman with monoclonal gammopathy.  Most likely he has multiple myeloma.   2.  Pancytopenia, which can be explained from multiple myeloma.   3.  Renal failure, which is improving with hydration.   4.  Enlarged prostate and elevated PSA suspicious for prostate cancer.      PLAN:   1.  As per patient's wishes, no bone marrow biopsy will be done during this hospitalization.  Patient will be going to rehab.  We will plan on seeing him back in the clinic in about 2 weeks' time to discuss regarding further investigation including bone marrow biopsy.  Patient's MRI reveals infiltrative marrow disorder which would go along with malignancy.   2.  Patient is pancytopenic.  We will recheck a CBC when he comes to the clinic for followup.   3.  Patient had a few questions, which were all answered.  We will sign off.  We will see him in clinic in 2 weeks.          ELIZABETH NEVILLE MD             D: 2018   T: 2018   MT: KEILA      Name:     ARCHIE ALDANA   MRN:      [id number]        Account:       [id number]   :      1940           Consult Date:  2018      Document: [id number]

## 2018-04-12 NOTE — PROGRESS NOTES
[facility]    Urology Progress Note     Assessment & Plan   Guille Aguilar is a 77 year old male who was admitted on 4/9/2018. Urinary retention with rubio, BPH, concern for CaP with PSA >10, bony lesions on CT concerning for metastatic disease vs. Multiple myeloma.     Plan: Continue rubio until outpatient follow up in urology office.   -recommend follow up with urology in approx 1-2 weeks for further rubio management, BPH/CaP evaluation, and cystoscopy.   -Will sign off from urology perspective, please call with any questions/concerns.     Francine Valdivia PA-C  [facility]  [address] [place] [address]  [phone]  [email or web address]  Text Page (7am to 5pm)    Interval History   Rubio draining blood tinged urine, no visible clots. Patient denies pain related to catheter   Creat reflecting continued downward trend; 21-->10-->2.99-->1.45  Hemoglobin stable at 8.2  Patient confused and fatigued     Physical Exam   Temp: 98.8  F (37.1  C) Temp src: Oral BP: (!) 87/52 Pulse: 87 Heart Rate: 84 Resp: 16 SpO2: 93 % O2 Device: None (Room air)    Vitals:    04/10/18 0202 04/11/18 0500 04/12/18 0516   Weight: 76.5 kg (168 lb 10.4 oz) 73.5 kg (162 lb 0.6 oz) 73.5 kg (162 lb 0.6 oz)     Vital Signs with Ranges  Temp:  [98  F (36.7  C)-99  F (37.2  C)] 98.8  F (37.1  C)  Pulse:  [76-87] 87  Heart Rate:  [79-89] 84  Resp:  [16-18] 16  BP: ()/(52-98) 87/52  SpO2:  [91 %-96 %] 93 %  I/O last 3 completed shifts:  In: 1531.25 [P.O.:1430; I.V.:101.25]  Out: 2975 [Urine:2975]    Constitutional: Sitting up at edge of bed, NAD. Notable confusion.   Eyes: no icterus  ENT: normocephalic, atraumatic   Respiratory: breathing unlabored   Cardiovascular: chest wall symmetric   GI: soft, NT  Genitourinary: rubio in place and draining blood tinged urine.   Neuropsychiatric: alert, confused     Medications     0.45% sodium chloride + KCl 20 mEq/L 75 mL/hr at 04/11/18  2304       famotidine  20 mg Intravenous Q24H     sodium chloride 0.9%  500 mL Intravenous Once     atorvastatin  10 mg Oral Daily     sodium chloride (PF)  3 mL Intracatheter Q8H       Data   Results for orders placed or performed during the hospital encounter of 04/09/18 (from the past 24 hour(s))   Glucose   Result Value Ref Range    Glucose 134 (H) 70 - 99 mg/dL   Glucose   Result Value Ref Range    Glucose 173 (H) 70 - 99 mg/dL   Glucose   Result Value Ref Range    Glucose 124 (H) 70 - 99 mg/dL   Potassium   Result Value Ref Range    Potassium 4.0 3.4 - 5.3 mmol/L   Glucose   Result Value Ref Range    Glucose 114 (H) 70 - 99 mg/dL   Glucose   Result Value Ref Range    Glucose 120 (H) 70 - 99 mg/dL   EKG 12-lead, tracing only   Result Value Ref Range    Interpretation ECG Click View Image link to view waveform and result    Lactic acid level STAT   Result Value Ref Range    Lactic Acid 1.1 0.7 - 2.0 mmol/L   Troponin I   Result Value Ref Range    Troponin I ES 0.087 (H) 0.000 - 0.045 ug/L   Lipase   Result Value Ref Range    Lipase 487 (H) 73 - 393 U/L   Hepatic panel   Result Value Ref Range    Bilirubin Direct 0.1 0.0 - 0.2 mg/dL    Bilirubin Total 0.3 0.2 - 1.3 mg/dL    Albumin 1.6 (L) 3.4 - 5.0 g/dL    Protein Total 7.2 6.8 - 8.8 g/dL    Alkaline Phosphatase 59 40 - 150 U/L    ALT 43 0 - 70 U/L    AST 39 0 - 45 U/L   INR   Result Value Ref Range    INR 1.35 (H) 0.86 - 1.14   Basic metabolic panel   Result Value Ref Range    Sodium 137 133 - 144 mmol/L    Potassium 3.5 3.4 - 5.3 mmol/L    Chloride 107 94 - 109 mmol/L    Carbon Dioxide 23 20 - 32 mmol/L    Anion Gap 7 3 - 14 mmol/L    Glucose 119 (H) 70 - 99 mg/dL    Urea Nitrogen 30 7 - 30 mg/dL    Creatinine 1.45 (H) 0.66 - 1.25 mg/dL    GFR Estimate 47 (L) >60 mL/min/1.7m2    GFR Estimate If Black 57 (L) >60 mL/min/1.7m2    Calcium 7.6 (L) 8.5 - 10.1 mg/dL   CBC with platelets differential   Result Value Ref Range    WBC 3.2 (L) 4.0 - 11.0 10e9/L    RBC  Count 2.80 (L) 4.4 - 5.9 10e12/L    Hemoglobin 8.2 (L) 13.3 - 17.7 g/dL    Hematocrit 24.7 (L) 40.0 - 53.0 %    MCV 88 78 - 100 fl    MCH 29.3 26.5 - 33.0 pg    MCHC 33.2 31.5 - 36.5 g/dL    RDW 13.8 10.0 - 15.0 %    Platelet Count 56 (L) 150 - 450 10e9/L    Diff Method Automated Method     % Neutrophils 50.2 %    % Lymphocytes 41.4 %    % Monocytes 5.3 %    % Eosinophils 3.1 %    % Basophils 0.0 %    % Immature Granulocytes 0.0 %    Nucleated RBCs 0 0 /100    Absolute Neutrophil 1.6 1.6 - 8.3 10e9/L    Absolute Lymphocytes 1.3 0.8 - 5.3 10e9/L    Absolute Monocytes 0.2 0.0 - 1.3 10e9/L    Absolute Eosinophils 0.1 0.0 - 0.7 10e9/L    Absolute Basophils 0.0 0.0 - 0.2 10e9/L    Abs Immature Granulocytes 0.0 0 - 0.4 10e9/L    Absolute Nucleated RBC 0.0    Magnesium   Result Value Ref Range    Magnesium 1.4 (L) 1.6 - 2.3 mg/dL   Phosphorus   Result Value Ref Range    Phosphorus 1.3 (L) 2.5 - 4.5 mg/dL   Glucose by meter   Result Value Ref Range    Glucose 160 (H) 70 - 99 mg/dL   XR Abdomen Port 1 View    Narrative    XR ABDOMEN PORT 1 VW 4/12/2018 6:23 AM    COMPARISON: CT dated 4/9/2018    HISTORY: Abdominal pain and nausea.      Impression    IMPRESSION: Distended gas-filled stomach, appears to have progressed  since 4/9/2018, concerning for developing proximal obstruction.    DIANDRA PACE MD

## 2018-04-12 NOTE — PLAN OF CARE
"Problem: Patient Care Overview  Goal: Plan of Care/Patient Progress Review  Outcome: No Change  A&O. VSS though BP's soft at times. Denies pain. Attempted sleep between cares. K rechecked for 4.0. UOP high. Urine pink. Bottom excoriated. T/R q2h. RRT called this AM for Chest \"Burning\" HoTN, lethargy. See note. Plan for Heme/Onc to f/u today. Possible transfer to  today.      "

## 2018-04-12 NOTE — PLAN OF CARE
Problem: Patient Care Overview  Goal: Plan of Care/Patient Progress Review  PT-Discussed with OT who just treated patient. Patient not able to tolerate both PT and OT eval this a.m. Will defer PT eval to 4/13.

## 2018-04-12 NOTE — PLAN OF CARE
Problem: Patient Care Overview  Goal: Plan of Care/Patient Progress Review  Outcome: No Change  More alert today but tires easily. Denies pain. Fair appetite today, enjoys ensure shakes. No BM for shift. High output from rubio, red urine. Bottom red/exciroated, barrier cream applied as needed. Potassium replaced, re-check at 2000

## 2018-04-13 NOTE — PROGRESS NOTES
[facility]    Hospitalist Progress Note    Date of Service (when I saw the patient): 04/13/2018    Assessment & Plan   Guille Aguilar is a 77 year old male with hx of CAD s/p CABG, dCHF, CKD, suspected multiple myeloma, and non-compliance despite strong recommendations from various providers who presented on 4/9/2018 with diarrhea and generalized weakness, and was admitted for acute renal failure and new bone lesions concerning for progressive multiple myeloma. He also likely has prostate cancer.     Acute renal failure on CKD stage III due to obstructive nephropathy/urinary retention, Improving  He was noted to have an elevated PSA >10 in Nov 2017, and was referred to Urology, but did not follow up. Presented on this admission with diarrhea, progressive generalized weakness, and unintentional weight loss >50 lb in last 6 months. Creatinine 21.8 on arrival from baseline 1.4 range. Likely multifactorial from diarrhea, multiple myeloma, and obstruction from urinary retention. Rubio catheter was placed in the ED with return of >2L of urine. Nephrology has been involved in his care. His renal function has continued to improve with IV fluids and Rubio catheter placement. .  - Cr 1.16 today, will follow  - d/c IV fluids today, 4/13  - Rubio catheter in place. Follow up with Urology in 1-2 weeks for further rubio management    Intermittent hypotension  RRT 4/12 for hypotension, likely due to profound autodiuresis amidst improving renal failure. Patient was noted to have been net neg 9L since admission despite IV fluids. BP improved with more aggressive fluid resuscitation.   - d/c IV fluids today and monitor    Bone lesions due to multiple myeloma vs metastatic disease  Possible prostate cancer    Most recently saw Dr. Tavarez at  Oncology in Nov 2017, but was lost to follow up. It was felt at that time that the patient probably had myeloma with anemia, thrombocytopenia, renal insufficiency, and monoclonal  gammopathy (IgG lambda) seen on SPEP/UPEP and immunofixation. Further work-up including bone marrow biopsy and skeletal survey was recommended, but patient did not follow up. CT abd/pelvis on arrival (obtained for diarrhea) incidentally showed numerous bone lesions as well as an enlarged prostate. As noted above, his PSA was previously elevated to >10, but patient has not followed up. MRI c/t/l spine (4/10) showed decreased T1 signal concerning for metastatic disease and acute to subacute compression fracture at T11-T12. Dr. Tavarez has discussed these findings with the patient, and has recommended pursuing diagnosis/treatment of MM first and then prostate cancer.   Had care conference with patient and his wife on 4/11. The patient is very weak and deconditioned and wants to pursue trial of TCU, and attempt to get stronger before he pursues bone marrow biopsy. He remains DNR/DNI, but goals otherwise remain restorative  - Dr. Tavarez to arrange outpatient follow up with [facility]  - Follow up with Urology in 1-2 weeks    Pancytopenia due to suspected multiple myeloma, Stable  Patient has developed progressive pancytopenia since Nov 2017. No s/sx of bleeding or infection. His CBC has been more or less stable throughout his hospital stay.    CAD, native heart, native vessels s/p 3-v CABG  [PTA: rosuvastatin 40 mg qhs] Tchol 96, HDL 36, LDL, 46 during admission  - Rosuvastatin was discontinued in favor of atorvastatin 10 mg daily  - Not on BB due to history of orthostatic hypotension. Not on aspirin due to history of melena    Elevated troponin due to demand ischemia  RRT 4/12 for hypotension. Serial troponins were obtained - minimally elevated to 0.08 and flat. Suspect demand ischemia due to hypotension and renal failure.    GERD  Started on ranitidine during his hospital stay for severe heartburn.   - Continues on ranitidine 150 mg BID, TUMS prn    Non-severe malnutrition in context of chronic illness  On Ensure  supplements BID as per Nutrition    # Pain Assessment:  Current Pain Score 4/13/2018   Patient currently in pain? denies   Pain score (0-10) -   Pain location -   Pain descriptors -   Guille palacios pain level was assessed and he currently denies pain.      FEN: Regular diet  DVT Prophylaxis: Pneumatic Compression Devices  Code Status: DNR/DNI    Disposition: Expected discharge to TCU tomorrow if BP stable    Lily Segura    Interval History   No events overnight. Denies pain, nausea, or shortness of breath.   - d/c IV fluids today and monitor BP    -Data reviewed today: I reviewed all new labs and imaging results over the last 24 hours. I personally reviewed no new images or EKGs today    Physical Exam   Temp: 97  F (36.1  C) Temp src: Oral BP: 123/79   Heart Rate: 78 Resp: 16 SpO2: 94 % O2 Device: None (Room air)    Vitals:    04/11/18 0500 04/12/18 0516 04/13/18 0602   Weight: 73.5 kg (162 lb 0.6 oz) 73.5 kg (162 lb 0.6 oz) 72.8 kg (160 lb 6.4 oz)     Vital Signs with Ranges  Temp:  [96.3  F (35.7  C)-98.8  F (37.1  C)] 97  F (36.1  C)  Heart Rate:  [77-87] 78  Resp:  [16] 16  BP: ()/(51-79) 123/79  SpO2:  [93 %-96 %] 94 %  I/O last 3 completed shifts:  In: 3288 [P.O.:240; I.V.:3048]  Out: 3425 [Urine:3425]    Constitutional: Appears comfortable, NAD  Respiratory: Breathing non-labored. Lungs CTAB - no wheezes, crackles, or rhonchi  Cardiovascular: Heart RRR, no m/r/g. Trace pedal edema  GI: +BS, abd soft/NT  Skin/Integumen: No rash  Neuro: Alert and appropriate, BAPTISTE  Psych: Calm and cooperative    Medications       ranitidine  150 mg Oral BID     sodium chloride 0.9%  500 mL Intravenous Once     atorvastatin  10 mg Oral Daily     sodium chloride (PF)  3 mL Intracatheter Q8H     Data     Recent Labs  Lab 04/13/18  0545 04/13/18  0155 04/12/18  2156  04/12/18  1430 04/12/18  1007 04/12/18  0540  04/11/18  2105  04/11/18  0631  04/09/18  1615 04/09/18  1410   WBC 3.5*  --   --   --   --   --  3.2*  --   --    --  2.8*  < >  --  4.2   HGB 7.5*  --   --   --   --   --  8.2*  --   --   --  7.7*  < >  --  8.4*   MCV 88  --   --   --   --   --  88  --   --   --  88  < >  --  86   PLT 57*  --   --   --   --   --  56*  --   --   --  63*  < >  --  82*   INR  --   --   --   --   --   --  1.35*  --   --   --   --   --  1.44*  --      --   --   --   --   --  137  --   --   --  141  < >  --  131*   POTASSIUM 3.6  --   --   --   --   --  3.5  --  4.0  --  3.1*  < > 7.0* 7.5*   CHLORIDE 109  --   --   --   --   --  107  --   --   --  110*  < >  --  99   CO2 20  --   --   --   --   --  23  --   --   --  24  < >  --  17*   BUN 22  --   --   --   --   --  30  --   --   --  54*  < >  --  187*   CR 1.16  --   --   --   --   --  1.45*  --   --   --  2.99*  < >  --  21.80*   ANIONGAP 8  --   --   --   --   --  7  --   --   --  7  < >  --  15*   RAKEL 7.3*  --   --   --   --   --  7.6*  --   --   --  8.0*  < >  --  8.9   GLC 97 103* 101*  < > 117* 125* 119*  < > 114*  < > 148*  < >  --  103*   ALBUMIN  --   --   --   --   --   --  1.6*  --   --   --   --   --   --  2.2*   PROTTOTAL  --   --   --   --   --   --  7.2  --   --   --   --   --   --  8.3   BILITOTAL  --   --   --   --   --   --  0.3  --   --   --   --   --   --  0.2   ALKPHOS  --   --   --   --   --   --  59  --   --   --   --   --   --  71   ALT  --   --   --   --   --   --  43  --   --   --   --   --   --  22   AST  --   --   --   --   --   --  39  --   --   --   --   --   --  15   LIPASE  --   --   --   --   --   --  487*  --   --   --   --   --   --  177   TROPI  --   --   --   --  0.075* 0.075* 0.087*  --   --   --   --   --   --  0.034   < > = values in this interval not displayed.    No results found for this or any previous visit (from the past 24 hour(s)).

## 2018-04-13 NOTE — PLAN OF CARE
Problem: Patient Care Overview  Goal: Plan of Care/Patient Progress Review  A&Ox4, forgetful. VSS on RA. LS clear, diminished. Denied pain. Blanchable redness on coccyx. R inner ankle scab covered with bandaid. Maya patent, AUO, yellow urine. Requested catheter information, provided to pt by writer. Plan to d/c to TCU tomorrow, pt reports providing 4 names of facilities to SW.

## 2018-04-13 NOTE — PLAN OF CARE
Problem: Patient Care Overview  Goal: Plan of Care/Patient Progress Review  Outcome: No Change  A/O x 4, forgetful at times. Denies pain.  Rubio draining clear, pink urine, adequate output. PIV infusing at 100.  Independently positions in bed.  Blood sugars monitored q 4 h, WDL.  Hemoglobin this am 7.5.  Plan to DC home with rubio when stable. F/U outpatient with urology.

## 2018-04-13 NOTE — PLAN OF CARE
Problem: Patient Care Overview  Goal: Plan of Care/Patient Progress Review  Discharge Planner OT   Patient plan for discharge: TCU  Current status: Pt completed supine to sit EOB with JAYDEN, cues for hand placement and Jayden sit to stand, amb with FWW JAYDEN to/from bathroom, pt limited tolerance for standing at sink for ADLS. Pt returned to chair and completed ADLS in seated.   Barriers to return to prior living situation: Stairs to enter and within home; Bathtub; Fall risk with history; Safety concerns with lack of insight  Recommendations for discharge: TCU per plan established by the Occupational Therapist  Rationale for recommendations: Pt limited by impaired cognition, safety, balance, and activity tolerance; thus, OT will proceed with daily intervention. Pt in agreement with TCU at discharge.        Entered by: Millicent White 04/13/2018 11:12 AM

## 2018-04-13 NOTE — PROGRESS NOTES
STAN  I: STAN spoke with patient to discuss d/c plan. Patient is aware that TCU is vinicio recommended. SW informed patient that due to [employer] insurance we would need to get prior auth prior to admission to a facility. Patient is ok with referrals being sent to [facility], [facility], [facility], [facility]. SW sent all referrals via DOD. STAN will update once assessments are complete.       ADDENDUM  I: [facility] is not able to run insurance until Monday.   -[facility] accepted patient. They will run insurance auth on Monday.  -[facility] received referral but unable to get auth until Monday.  -[facility] declined    Lyndsay Bell, FABIANAW   *[phone]

## 2018-04-13 NOTE — PLAN OF CARE
Problem: Patient Care Overview  Goal: Plan of Care/Patient Progress Review  Discharge Planner PT   Patient plan for discharge: TCU  Current status: Per OT note, patient needs min assist for bed mobility and for gait with ww.   Barriers to return to prior living situation: Stairs to enter and within home, fall history, lack of insight  Recommendations for discharge: TCU  Rationale for recommendations: Patient currently being seen by OT. Agreeable to TCU so will defer PT to TCU.       Entered by: Dianne Ramos 04/13/2018 12:49 PM

## 2018-04-13 NOTE — PLAN OF CARE
Problem: Patient Care Overview  Goal: Plan of Care/Patient Progress Review  A and O x 4. Pleasant and cooperative. VSS, BP softer at times. Denies pain.  Lungs clear, bowel sounds active.  On RA. R PIV NS+20K@100,  L PIV SL.  Maya patent, draining pink, adequate amount of urine.  On 2 G Na diet: tolerating well. Troponin elevated, (trending down) Md aware. Assist of 1 and GB. Continue to monitor. Plan: discharge pending.

## 2018-04-13 NOTE — PLAN OF CARE
Problem: Patient Care Overview  Goal: Plan of Care/Patient Progress Review  Outcome: Improving  AOx4, forgetful at times. VSS. Denies pain. LS: clear, infrequent dry cough. Coccyx blanchable, KUMAR. R ankle wound, KUMAR. Regular diet, good appetite. Up to chair for most of day. Rubio draining clear, pink urine, adequate output. R PIV SL. Hgb: 7.5, MD aware, stable. Plan to DC with rubio when stable. F/U outpatient with urology. Plan: possibly d/c tomorrow. Continue to monitor.

## 2018-04-13 NOTE — PROGRESS NOTES
"SPIRITUAL HEALTH SERVICES Progress Note  [facility]     visited pt on f/u request. Pt was sitting up in bed and welcomed the visit. 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 Pt named being told that he needs to go to a TCU tomorrow to get strong before going home but was evaluating his life and work in terms of eternal value. 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 Pt welcomed biblical assurances of hearing, \"Well done, good and faithful servant\" and the \"crown of glory\" ready for him based on wise investments of his time, treasure, and talents. Pt became tearful while sharing the \"privilege\" of being used by God. Pt welcomed extended prayer and bestowed a blessing on this . No plans to follow pending d/c.    Shailesh Nunes M.Div.  Chaplain Resident  [phone] Pager  "

## 2018-04-14 NOTE — PROGRESS NOTES
[facility]  Hospitalist Progress Note  Tolu Gonzalez MD  04/14/2018    Assessment & Plan   Guille Aguilar is a 77 year old male with hx of CAD s/p CABG, dCHF, CKD, suspected multiple myeloma, and non-compliance despite strong recommendations from various providers who presented on 4/9/2018 with diarrhea and generalized weakness, and was admitted for acute renal failure and new bone lesions concerning for progressive multiple myeloma. He also likely has prostate cancer.      Acute renal failure on CKD stage III due to obstructive nephropathy/urinary retention, Improving  He was noted to have an elevated PSA >10 in Nov 2017, and was referred to Urology, but did not follow up. Presented on this admission with diarrhea, progressive generalized weakness, and unintentional weight loss >50 lb in last 6 months. Creatinine 21.8 on arrival from baseline 1.4 range. Likely multifactorial from diarrhea, multiple myeloma, and obstruction from urinary retention. Rubio catheter was placed in the ED with return of >2L of urine. Nephrology has been involved in his care. His renal function has continued to improve with IV fluids and Rubio catheter placement. .  - Cr 1.16, 1.08 today, will follow  - off IV fluids, drinking ok  - Rubio catheter in place. Follow up with Urology in 1-2 weeks for further rubio management     Intermittent hypotension  RRT 4/12 for hypotension, likely due to profound autodiuresis amidst improving renal failure. Patient was noted to have been net neg 9L since admission despite IV fluids. BP improved with more aggressive fluid resuscitation.   - bp stable     Bone lesions due to multiple myeloma vs metastatic disease  Possible prostate cancer                          Most recently saw Dr. Tavarez at  Oncology in Nov 2017, but was lost to follow up. It was felt at that time that the patient probably had myeloma with anemia, thrombocytopenia, renal insufficiency, and monoclonal gammopathy  (IgG lambda) seen on SPEP/UPEP and immunofixation. Further work-up including bone marrow biopsy and skeletal survey was recommended, but patient did not follow up. CT abd/pelvis on arrival (obtained for diarrhea) incidentally showed numerous bone lesions as well as an enlarged prostate. As noted above, his PSA was previously elevated to >10, but patient has not followed up. MRI c/t/l spine (4/10) showed decreased T1 signal concerning for metastatic disease and acute to subacute compression fracture at T11-T12. Dr. Tavarez has discussed these findings with the patient, and has recommended pursuing diagnosis/treatment of MM first and then prostate cancer.                         Had care conference with patient and his wife on 4/11. The patient is very weak and deconditioned and wants to pursue trial of TCU, and attempt to get stronger before he pursues bone marrow biopsy. He remains DNR/DNI, but goals otherwise remain restorative  - Dr. Tavarez to arrange outpatient follow up with [facility]  - Follow up with Urology in 1-2 weeks    Pancytopenia due to suspected multiple myeloma, Stable  Patient has developed progressive pancytopenia since Nov 2017. No s/sx of bleeding or infection. His CBC has been more or less stable throughout his hospital stay.     CAD, native heart, native vessels s/p 3-v CABG  [PTA: rosuvastatin 40 mg qhs] Tchol 96, HDL 36, LDL, 46 during admission  - Rosuvastatin was discontinued in favor of atorvastatin 10 mg daily  - Not on BB due to history of orthostatic hypotension. Not on aspirin due to history of melena     Elevated troponin due to demand ischemia  RRT 4/12 for hypotension. Serial troponins were obtained - minimally elevated to 0.08 and flat. Suspect demand ischemia due to hypotension and renal failure.     GERD  Started on ranitidine during his hospital stay for severe heartburn.   - Continues on ranitidine 150 mg BID, TUMS prn     Non-severe malnutrition in context of chronic illness  On  "Ensure supplements BID as per Nutrition     # Pain Assessment:  Current Pain Score 4/13/2018   Patient currently in pain? denies   Pain score (0-10) -   Pain location -   Pain descriptors -   Guille palacios pain level was assessed and he currently denies pain.       FEN: Regular diet  DVT Prophylaxis: Pneumatic Compression Devices  Code Status: DNR/DNI     Disposition:   - To TCU on 4/15 or 4/16      # Pain Assessment:  Current Pain Score 4/14/2018   Patient currently in pain? denies   Pain score (0-10) -   Pain location -   Pain descriptors -           Interval History   - chart reviewed  - no TCU bed available todat    -Data reviewed today: I reviewed all new labs and imaging over the last 24 hours. I personally reviewed no images or EKG's today.    Physical Exam   Heart Rate: 78, Blood pressure 125/80, pulse 90, temperature 96.7  F (35.9  C), temperature source Oral, resp. rate 16, height 1.88 m (6' 2\"), weight 77.1 kg (170 lb), SpO2 97 %.  Vitals:    04/12/18 0516 04/13/18 0602 04/14/18 0606   Weight: 73.5 kg (162 lb 0.6 oz) 72.8 kg (160 lb 6.4 oz) 77.1 kg (170 lb)     Vital Signs with Ranges  Temp:  [96  F (35.6  C)-96.8  F (36  C)] 96.7  F (35.9  C)  Pulse:  [90] 90  Heart Rate:  [74-78] 78  Resp:  [16] 16  BP: ()/(58-84) 125/80  SpO2:  [95 %-97 %] 97 %  I/O's Last 24 hours  I/O last 3 completed shifts:  In: 240 [P.O.:240]  Out: 2825 [Urine:2825]    Constitutional: Awake, alert, cooperative, no apparent distress  Respiratory: Clear to auscultation bilaterally, no crackles or wheezing  Cardiovascular: Regular rate and rhythm, normal S1 and S2, and no murmur noted  GI: Normal bowel sounds, soft, non-distended, non-tender  Skin/Integumen: No rashes, no cyanosis, no edema  Other:      Medications   All medications were reviewed.      ranitidine  150 mg Oral BID     sodium chloride 0.9%  500 mL Intravenous Once     atorvastatin  10 mg Oral Daily     sodium chloride (PF)  3 mL Intracatheter Q8H        Data "     Recent Labs  Lab 04/14/18  0730 04/13/18  0545 04/13/18  0155  04/12/18  1430 04/12/18  1007 04/12/18  0540  04/09/18  1615 04/09/18  1410   WBC 3.0* 3.5*  --   --   --   --  3.2*  < >  --  4.2   HGB 7.9* 7.5*  --   --   --   --  8.2*  < >  --  8.4*   MCV 88 88  --   --   --   --  88  < >  --  86   PLT 64* 57*  --   --   --   --  56*  < >  --  82*   INR  --   --   --   --   --   --  1.35*  --  1.44*  --     137  --   --   --   --  137  < >  --  131*   POTASSIUM 3.7 3.6  --   --   --   --  3.5  < > 7.0* 7.5*   CHLORIDE 107 109  --   --   --   --  107  < >  --  99   CO2 23 20  --   --   --   --  23  < >  --  17*   BUN 23 22  --   --   --   --  30  < >  --  187*   CR 1.08 1.16  --   --   --   --  1.45*  < >  --  21.80*   ANIONGAP 7 8  --   --   --   --  7  < >  --  15*   RAKEL 7.3* 7.3*  --   --   --   --  7.6*  < >  --  8.9   * 97 103*  < > 117* 125* 119*  < >  --  103*   ALBUMIN  --   --   --   --   --   --  1.6*  --   --  2.2*   PROTTOTAL  --   --   --   --   --   --  7.2  --   --  8.3   BILITOTAL  --   --   --   --   --   --  0.3  --   --  0.2   ALKPHOS  --   --   --   --   --   --  59  --   --  71   ALT  --   --   --   --   --   --  43  --   --  22   AST  --   --   --   --   --   --  39  --   --  15   LIPASE  --   --   --   --   --   --  487*  --   --  177   TROPI  --   --   --   --  0.075* 0.075* 0.087*  --   --  0.034   < > = values in this interval not displayed.    No results found for this or any previous visit (from the past 24 hour(s)).    Tolu Gonzalez MD  Text Page  (7am to 6pm)

## 2018-04-14 NOTE — PLAN OF CARE
Problem: Patient Care Overview  Goal: Plan of Care/Patient Progress Review  Pt A&O x3, disoriented to time/forgetful and drowsy. VSS on RA. LS diminished. Denied pain. R inner ankle scab covered, c/d/i. Regular diet. Maya with orange/yellow output, adequate urine output. Pt states he is passing flatus,drainage, but are WDL. Coccyx has blanchable erythema. 0200 . Plan to d/c to TCU today 4/14.

## 2018-04-14 NOTE — PLAN OF CARE
Problem: Patient Care Overview  Goal: Discharge Needs Assessment  Outcome: Improving    COPY BELOW FOR END OF SHIFT NOTE  Shift Update:  VSS, afebrile, denies pain, calls for assist, up w1 and walker.  Will dc to TCU w-rubio, watched video, cannot dc before Monday d/t insurance.  Will have outpatient f/u with Heme/Onc Urology and Neph.

## 2018-04-14 NOTE — PROGRESS NOTES
I: STAN reviewed chart; patient is anticipated to discharge to TCU when insurance authorization is confirmed.    P: Earliest DC would be Monday 04/16 due to [facility]'s [facility]. Insurance cannot be authorized over the weekend. Clinically, patient has been accepted at [facility]; pending insurance auth. STAN will continue to follow for discharge. PAS completed [id number] and placed in medical chart.     Becky PIERCE  4/14/2018 11:36 AM   Phone: [phone]

## 2018-04-14 NOTE — PLAN OF CARE
Problem: Patient Care Overview  Goal: Plan of Care/Patient Progress Review  Outcome: No Change  Assumed care of pt at 1900.  Pt alert, oriented x3, disoriented to time, forgetful. VSS on RA. LS diminished. Denied pain.  R inner ankle scab covered with dressing c/d/i. Regular diet, good appetite. Maya with watermelon output, adequate urine output.  Pt states he is passing flatus, no BM.  R PIV SL,  L PIV SL, both sites have dried drainage, but are WDL.  Coccyx has blancheable erythema.   Plan to d/c to TCU tomorrow, pt reports providing 4 names of facilities to SW.

## 2018-04-15 NOTE — PLAN OF CARE
Problem: Patient Care Overview  Goal: Plan of Care/Patient Progress Review  Outcome: No Change  Patient was cared for from 11pm to 3pm.  Patient is A&Ox4, but can be forgetful at times. VSS. Denies pain. On regular diet. Coccyx is blanchable erythema and right ankle abrasion. Maya patent with yellow output. Up with assist of 1 and walker/GB. Repositions independently. Will continue to monitor.

## 2018-04-15 NOTE — PLAN OF CARE
Problem: Patient Care Overview  Goal: Plan of Care/Patient Progress Review  Outcome: No Change  VSS, AOx4, forgetful at times, denies pain.  Up w1 and walker.  Alarm active and audible.  Coccyx: blanchable erythema. R ankle wound: covered with dressing per pt request, c/d/i.  Regular diet.   Rubio, adequate, yellow output.   R PIV SL,  L PIV SL, WDL.  Will dc to TCU w-rubio, watched video, cannot dc before Monday d/t insurance.  Will have outpatient f/u with Heme/Onc Urology and Neph.

## 2018-04-15 NOTE — PROGRESS NOTES
[facility]  Hospitalist Progress Note  Jarad Damon MD  04/15/2018    Assessment & Plan   Guille Aguilar is a 77 year old male with hx of CAD s/p CABG, dCHF, CKD, suspected multiple myeloma, and non-compliance despite strong recommendations from various providers who presented on 4/9/2018 with diarrhea and generalized weakness, and was admitted for acute renal failure and new bone lesions concerning for progressive multiple myeloma. He also likely has prostate cancer.      Acute renal failure on CKD stage III due to obstructive nephropathy/urinary retention, Improving  He was noted to have an elevated PSA >10 in Nov 2017, and was referred to Urology, but did not follow up. Presented on this admission with diarrhea, progressive generalized weakness, and unintentional weight loss >50 lb in last 6 months. Creatinine 21.8 on arrival from baseline 1.4 range. Likely multifactorial from diarrhea, multiple myeloma, and obstruction from urinary retention. Rubio catheter was placed in the ED with return of >2L of urine. Nephrology has been involved in his care. His renal function has continued to improve with IV fluids and Rubio catheter placement. .  - Cr 1.16, 1.08 today, will follow  - off IV fluids, drinking ok  - Rubio catheter in place. Follow up with Urology in 1-2 weeks for further rubio management     Intermittent hypotension  RRT 4/12 for hypotension, likely due to profound autodiuresis amidst improving renal failure. Patient was noted to have been net neg 9L since admission despite IV fluids. BP improved with more aggressive fluid resuscitation.   - bp stable     Bone lesions due to multiple myeloma vs metastatic disease  Possible prostate cancer                          Most recently saw Dr. Tavarez at  Oncology in Nov 2017, but was lost to follow up. It was felt at that time that the patient probably had myeloma with anemia, thrombocytopenia, renal insufficiency, and monoclonal  gammopathy (IgG lambda) seen on SPEP/UPEP and immunofixation. Further work-up including bone marrow biopsy and skeletal survey was recommended, but patient did not follow up. CT abd/pelvis on arrival (obtained for diarrhea) incidentally showed numerous bone lesions as well as an enlarged prostate. As noted above, his PSA was previously elevated to >10, but patient has not followed up. MRI c/t/l spine (4/10) showed decreased T1 signal concerning for metastatic disease and acute to subacute compression fracture at T11-T12. Dr. Tavarez has discussed these findings with the patient, and has recommended pursuing diagnosis/treatment of MM first and then prostate cancer.  - Dr. Tavarez to arrange outpatient follow up with [facility]  - Follow up with Urology in 1-2 weeks    Pancytopenia due to suspected multiple myeloma, Stable, related to MM     CAD, native heart, native vessels s/p 3-v CABG  [PTA: rosuvastatin 40 mg qhs] Tchol 96, HDL 36, LDL, 46 during admission  - Rosuvastatin was discontinued in favor of atorvastatin 10 mg daily  - Not on BB due to history of orthostatic hypotension. Not on aspirin due to history of melena     Elevated troponin due to demand ischemia     GERD  Started on ranitidine during his hospital stay for severe heartburn.   - Continues on ranitidine 150 mg BID, TUMS prn     Non-severe malnutrition in context of chronic illness  On Ensure supplements BID as per Nutrition     # Pain Assessment:  Current Pain Score 4/13/2018   Patient currently in pain? denies   Pain score (0-10) -   Pain location -   Pain descriptors -   Guille palacios pain level was assessed and he currently denies pain.       FEN: Regular diet  DVT Prophylaxis: Pneumatic Compression Devices  Code Status: DNR/DNI     Disposition:   - To TCU on  4/16 (pending insurance approval).  Wife updated.        # Pain Assessment:  Current Pain Score 4/15/2018   Patient currently in pain? denies   Pain score (0-10) -   Pain location -   Pain  "descriptors -      Jarad Arzatesaeid   Pager: [phone]  Cell Phone:  [phone]        Interval History   No new complaints, agreeable to TCU.     -Data reviewed today: I reviewed all new labs and imaging over the last 24 hours. I personally reviewed no images or EKG's today.    Physical Exam   Heart Rate: 84, Blood pressure 96/58, pulse 90, temperature 96.2  F (35.7  C), temperature source Oral, resp. rate 16, height 1.88 m (6' 2\"), weight 76.7 kg (169 lb), SpO2 95 %.  Vitals:    04/13/18 0602 04/14/18 0606 04/15/18 0555   Weight: 72.8 kg (160 lb 6.4 oz) 77.1 kg (170 lb) 76.7 kg (169 lb)     Vital Signs with Ranges  Temp:  [96  F (35.6  C)-96.8  F (36  C)] 96.2  F (35.7  C)  Heart Rate:  [76-84] 84  Resp:  [16] 16  BP: ()/(58-86) 96/58  SpO2:  [95 %-97 %] 95 %  I/O's Last 24 hours  I/O last 3 completed shifts:  In: -   Out: 2550 [Urine:2550]    Constitutional: Awake, alert, cooperative, no apparent distress  Respiratory: Clear to auscultation bilaterally, no crackles or wheezing  Cardiovascular: Regular rate and rhythm, normal S1 and S2, and no murmur noted  GI: Normal bowel sounds, soft, non-distended, non-tender  Skin/Integumen: No rashes, no cyanosis, no edema  Other:      Medications   All medications were reviewed.      ranitidine  150 mg Oral BID     sodium chloride 0.9%  500 mL Intravenous Once     atorvastatin  10 mg Oral Daily     sodium chloride (PF)  3 mL Intracatheter Q8H        Data     Recent Labs  Lab 04/15/18  0759 04/14/18  0730 04/13/18  0545  04/12/18  1430 04/12/18  1007 04/12/18  0540  04/09/18  1615 04/09/18  1410   WBC 2.9* 3.0* 3.5*  --   --   --  3.2*  < >  --  4.2   HGB 8.3* 7.9* 7.5*  --   --   --  8.2*  < >  --  8.4*   MCV 88 88 88  --   --   --  88  < >  --  86   PLT 71* 64* 57*  --   --   --  56*  < >  --  82*   INR  --   --   --   --   --   --  1.35*  --  1.44*  --     137 137  --   --   --  137  < >  --  131*   POTASSIUM 3.6 3.7 3.6  --   --   --  3.5  < > 7.0* " 7.5*   CHLORIDE 107 107 109  --   --   --  107  < >  --  99   CO2 25 23 20  --   --   --  23  < >  --  17*   BUN 25 23 22  --   --   --  30  < >  --  187*   CR 1.12 1.08 1.16  --   --   --  1.45*  < >  --  21.80*   ANIONGAP 6 7 8  --   --   --  7  < >  --  15*   RAKEL 7.4* 7.3* 7.3*  --   --   --  7.6*  < >  --  8.9   GLC 94 104* 97  < > 117* 125* 119*  < >  --  103*   ALBUMIN  --   --   --   --   --   --  1.6*  --   --  2.2*   PROTTOTAL  --   --   --   --   --   --  7.2  --   --  8.3   BILITOTAL  --   --   --   --   --   --  0.3  --   --  0.2   ALKPHOS  --   --   --   --   --   --  59  --   --  71   ALT  --   --   --   --   --   --  43  --   --  22   AST  --   --   --   --   --   --  39  --   --  15   LIPASE  --   --   --   --   --   --  487*  --   --  177   TROPI  --   --   --   --  0.075* 0.075* 0.087*  --   --  0.034   < > = values in this interval not displayed.    No results found for this or any previous visit (from the past 24 hour(s)).

## 2018-04-16 PROBLEM — C61 PROSTATE CANCER (H): Status: ACTIVE | Noted: 2018-01-01

## 2018-04-16 PROBLEM — G62.9 PERIPHERAL NEUROPATHY: Status: ACTIVE | Noted: 2018-01-01

## 2018-04-16 PROBLEM — M89.9 BONE LESION: Status: ACTIVE | Noted: 2018-01-01

## 2018-04-16 PROBLEM — K21.9 GASTROESOPHAGEAL REFLUX DISEASE WITHOUT ESOPHAGITIS: Status: ACTIVE | Noted: 2018-01-01

## 2018-04-16 PROBLEM — R33.9 URINARY RETENTION: Status: ACTIVE | Noted: 2018-01-01

## 2018-04-16 PROBLEM — C90.00 MULTIPLE MYELOMA NOT HAVING ACHIEVED REMISSION (H): Status: ACTIVE | Noted: 2018-01-01

## 2018-04-16 NOTE — PLAN OF CARE
"Problem: Patient Care Overview  Goal: Plan of Care/Patient Progress Review   A/ox4, forgetful at times. VSS. Denies pain. Coccyx blanchable erythema. R ankle sore scabbed over, new mepliex dressing applied - CDI. Regular diet,, up in chair for supper, fair appetite. Rubio catheter patent with adequate yellow output. R PIV SL, L PIV SL. Up with A1 walker and gait belt, patient states he feels \"extremely weak\". Plan to discharge tomorrow to [facility] with rubio in place. Patient to have outpatient follow-up appts with Urology/Nephrology and Heme/Onc. Continue to monitor.       "

## 2018-04-16 NOTE — PLAN OF CARE
Problem: Patient Care Overview  Goal: Plan of Care/Patient Progress Review  Outcome: No Change  Pt denied pain. VS stable. Red balnchable coccyx, pt encouraged to independently reposition. R ankle abrasion covered w/foam drsg. . Regular diet. Up w/Ax1 walker + gb. Maya in place. Plan for dc 4/16.

## 2018-04-16 NOTE — PLAN OF CARE
Problem: Renal Failure/Kidney Injury, Acute (Adult)  Goal: Signs and Symptoms of Listed Potential Problems Will be Absent, Minimized or Managed (Renal Failure/Kidney Injury, Acute)  Signs and symptoms of listed potential problems will be absent, minimized or managed by discharge/transition of care (reference Renal Failure/Kidney Injury, Acute (Adult) CPG).  Outcome: Improving  Patient A/Ox4, forgetful. VSS on room air. Denies pain. C/o acid reflux/nausea - given Zantac, Prilosec, and PRN Zofran. Up A1/walker - ambulated x2. Maya patent, good output. Regular diet - minimal appetite d/t nausea. Plan to discharge to [facility] - transport scheduled for 1915 this evening.

## 2018-04-16 NOTE — DISCHARGE INSTRUCTIONS
PLEASE BE SURE PATIENT AND FAMILY KEEP THE FOLLOWING FOLLOW UP APPOINTMENTS AS SCHEDULED    Dr. Hart Urology 4/25 3:00 p.m.    Dr. Tavarez Oncology 4/26 2:00 p.m

## 2018-04-16 NOTE — PLAN OF CARE
Problem: Patient Care Overview  Goal: Plan of Care/Patient Progress Review  OT: pt to d/c to TCU today, GOALS NOT MET, see discharge summary

## 2018-04-16 NOTE — DISCHARGE SUMMARY
[facility]    Discharge Summary  Hospitalist    Date of Admission:  4/9/2018  Date of Discharge:  4/17/2018  Discharging Provider: Jarad Damon MD    Discharge Diagnoses   Principal Problem:    Acute kidney failure related to bladder outlet obstruction   -- resolved with rubio placement  Active Problems:    Essential hypertension, benign    Left ventricular diastolic dysfunction    Urinary retention    Gastroesophageal reflux disease without esophagitis   -- improved with PPI bid    Multiple myeloma   -- awaiting Bone Marrow bx for definitive diagnosis per Dr. Tavarez    Prostate cancer (H)    Bone lesions (Multiple myeloma vs Prostate CA mets)    Peripheral neuropathy   -- better with neurontin      History of Present Illness   77 year old male who presents with 5 weeks of diarrhea and difficulty urinating. He notes unintentional 50 lb weight loss. He notes that he recently saw his cardiologist and had a heart workup that was negative. He reports he saw a hematologist and that he did not want to undergo much workup at that point. He did not get a bone marrow biopsy and did not follow up with a urologist. He states he has been feeling progressively weaker. No chest pain or shortness of breath. He started wearing diapers 2 weeks ago. He notes the weakness is an all over weakness that is not worse one place or another. He notes a severe neuropathy over the past few weeks, with tingling that has involved some of his legs.  Initial creat 21.80, , and potassium 7.5, sodium 131, albumin 2.2, hgb 8.4 with plt's 82.     Hospital Course   Rubio placed, PVR 2000 ml, seen by urology, PSA >10, seen by Oncology, Dr. Tavarez, SPEP showed a monoclonal spike and his abdominal CT showed multiple bone lesions suggestive of multiple myeloma, but can not exclude metastatic prostate cancer.  The patient is considering doing a bone marrow bx as an outpt.  His pancytopenia is thought to be related to his  multiple myeloma.      Was seen by Dr. Lutz from nephrology, and creat normalized by discharge, and potassium and sodium normal as well once obstruction resolved.      Because of generalized weakness he will discharge to a TCU for PT and OT and follow up with urology in 1-2 weeks for a voiding trial, and follow up with Dr. Tavarez concerning Multiple myeloma and definitive diagnosis.     Jarad Damon MD  Pager: [phone]  Cell Phone:  [phone]       Significant Results and Procedures   As above    Pending Results   These results will be followed up by Dr. Damon  Unresulted Labs Ordered in the Past 30 Days of this Admission     No orders found from 2/8/2018 to 4/10/2018.          Code Status   DNR / DNI       Primary Care Physician   Physician No Ref-Primary    Physical Exam   Temp: 96.2  F (35.7  C) Temp src: Oral BP: 115/76   Heart Rate: 79 Resp: 16 SpO2: 97 % O2 Device: None (Room air)    Vitals:    04/14/18 0606 04/15/18 0555 04/16/18 0503   Weight: 77.1 kg (170 lb) 76.7 kg (169 lb) 76.4 kg (168 lb 6.4 oz)     Vital Signs with Ranges  Temp:  [96.2  F (35.7  C)-97.8  F (36.6  C)] 96.2  F (35.7  C)  Heart Rate:  [70-80] 79  Resp:  [16] 16  BP: (102-124)/(73-81) 115/76  SpO2:  [94 %-100 %] 97 %  I/O last 3 completed shifts:  In: 120 [P.O.:120]  Out: 1575 [Urine:1575]    Exam on discharge:   Lungs clear  CV regular S1S2  Alert, OX#, but generalized weakness.     # Discharge Pain Plan:   - Patient currently has NO PAIN and is not being prescribed pain medications on discharge.      Discharge Disposition   Discharged to short-term care facility  Condition at discharge: Fair    Consultations This Hospital Stay   NEPHROLOGY IP CONSULT  PHYSICAL THERAPY ADULT IP CONSULT  OCCUPATIONAL THERAPY ADULT IP CONSULT  NEPHROLOGY IP CONSULT  HEMATOLOGY & ONCOLOGY IP CONSULT  UROLOGY IP CONSULT  SOCIAL WORK IP CONSULT  PHYSICAL THERAPY ADULT IP CONSULT  OCCUPATIONAL THERAPY ADULT IP CONSULT    Time Spent on this  Encounter   I spent a total of 35 minutes discharging this patient.     Discharge Orders     Mantoux instructions   Give two-step Mantoux (PPD) Per Facility Policy Yes     General info for SNF   Length of Stay Estimate: Short Term Care: Estimated # of Days <30  Condition at Discharge: Improving  Level of care:skilled   Rehabilitation Potential: Good  Admission H&P remains valid and up-to-date: Yes  Recent Chemotherapy: N/A  Use Nursing Home Standing Orders: Yes     Reason for your hospital stay   Renal failure and deconditioning due to suspected multiple myeloma and possible prostate cancer. You were treated with IV fluids and a urinary catheter was placed with ongoing improvement in your kidney function. A bone marrow biopsy was offered, but declined for the time being. You are discharging to a transitional care facility for rehab     Maya catheter   To straight gravity drainage. Change catheter every 2 weeks and PRN for leaking or decreased uring output with signs of bladder distention. DO NOT change catheter without a specific MD order IF diagnosis of benign prostatic hypertrophy (BPH), neurogenic bladder, or other urological conditions     Follow Up and recommended labs and tests   Follow up with transitional care physician.  The following labs/tests are recommended: basic metabolic panel within 1 week.    Follow up with Dr. Tavarez as scheduled    [facility] with [facility] in 1-2 weeks for catheter management     Activity - Up with nursing assistance     DNR/DNI     Physical Therapy Adult Consult   Evaluate and treat as clinically indicated.    Reason:  Generalized weakness, deconditioning     Occupational Therapy Adult Consult   Evaluate and treat as clinically indicated.    Reason:  Generalized weakness, deconditioning     Fall precautions     Advance Diet as Tolerated   Follow this diet upon discharge: Regular diet, Ensure Plus (Adult); Between Meals       Discharge Medications   Current Discharge  Medication List      START taking these medications    Details   omeprazole (PRILOSEC) 20 MG CR capsule Take 1 capsule (20 mg) by mouth every morning  Qty: 30 capsule    Associated Diagnoses: Gastroesophageal reflux disease without esophagitis      gabapentin (NEURONTIN) 100 MG capsule Take 2 capsules (200 mg) by mouth At Bedtime  Qty: 20 capsule, Refills: 1    Associated Diagnoses: Idiopathic peripheral neuropathy      calcium carbonate (TUMS) 500 MG chewable tablet Take 1 tablet (500 mg) by mouth 3 times daily as needed for heartburn  Qty: 30 tablet    Associated Diagnoses: Gastroesophageal reflux disease without esophagitis      atorvastatin (LIPITOR) 10 MG tablet Take 1 tablet (10 mg) by mouth daily  Qty: 30 tablet    Associated Diagnoses: Coronary artery disease involving native coronary artery of native heart without angina pectoris         STOP taking these medications       rosuvastatin (CRESTOR) 40 MG tablet Comments:   Reason for Stopping:             Allergies   Allergies   Allergen Reactions     Dust Mites      No Known Allergies      Data   Most Recent 3 CBC's:  Recent Labs   Lab Test  04/17/18   0652  04/16/18   0705  04/15/18   0759   WBC  3.0*  3.1*  2.9*   HGB  7.8*  7.9*  8.3*   MCV  89  87  88   PLT  105*  84*  71*      Most Recent 3 BMP's:  Recent Labs   Lab Test  04/17/18   0652  04/16/18   0705  04/15/18   0759   NA  137  136  138   POTASSIUM  4.3  3.9  3.6   CHLORIDE  107  107  107   CO2  23  22  25   BUN  21  23  25   CR  1.17  1.18  1.12   ANIONGAP  7  7  6   RAKEL  7.9*  7.7*  7.4*   GLC  99  99  94     Most Recent 2 LFT's:  Recent Labs   Lab Test  04/12/18   0540  04/09/18   1410   AST  39  15   ALT  43  22   ALKPHOS  59  71   BILITOTAL  0.3  0.2     Most Recent INR's and Anticoagulation Dosing History:  Anticoagulation Dose History     Recent Dosing and Labs Latest Ref Rng & Units 11/29/2006 2/17/2011 3/25/2011 3/28/2011 3/28/2011 4/9/2018 4/12/2018    INR 0.86 - 1.14 1.09 1.19(H) 1.09  1.51(H) 1.32(H) 1.44(H) 1.35(H)        Most Recent 3 Troponin's:  Recent Labs   Lab Test  04/12/18   1430  04/12/18   1007  04/12/18   0540   TROPI  0.075*  0.075*  0.087*     Most Recent Cholesterol Panel:  Recent Labs   Lab Test  04/11/18   0631   CHOL  96   LDL  46   HDL  36*   TRIG  71     Most Recent 6 Bacteria Isolates From Any Culture (See EPIC Reports for Culture Details):No lab results found.  Most Recent TSH, T4 and A1c Labs:  Recent Labs   Lab Test  04/09/18   2125   10/02/17   1132   TSH   --    --   1.56   A1C  5.4   < >   --     < > = values in this interval not displayed.

## 2018-04-16 NOTE — PROGRESS NOTES
SPIRITUAL HEALTH SERVICES Progress Note  [facility]    SH visited pt and his spouse on f/u. Pt is set to d/c but pt and spouse expressed concern about the facility. 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 SH provided reflective nonjudgmental listening and support affirming the couple's ministry and impact and trust in the Lord. SH had read pt's booklet of his life and times in [place] as a young man and thanked pt for his story and testimony. Pt and spouse welcomed prayer. SH informed SW of pt's concern. SW will f/u. No plans to follow pending d/c.    Shailesh Nunes M.Div.  Chaplain Resident  [phone] Pager

## 2018-04-16 NOTE — PROGRESS NOTES
Per update patient is medically/financially approved for TCU stay with [facility].  Scheduled patient for Urology follow up with DR. Hart 4/25 15:00 [place] location in [facility] and will be sent to TCU for continued updates.    Patient also scheduled for Dr. Tavarez 4/26 in [facility].  Hand off submitted to Dr. Wagoner

## 2018-04-16 NOTE — PROGRESS NOTES
STAN  I: STAN spoke with patient and spouse to discuss d/c plan. Patient's spouse stated she received a call from [facility] stating that they recieved [employer] Auth. Patient and spouse is agreeable to having patient d/c to facility. Patient would like a w/c ride arranged and is ok with fees associated with transport. SW called [facility] and left message asking for bed confirmation. SW arranged ride for 1915.    PAS-RR    D: Per DHS regulation, STAN completed and submitted PAS-RR to [place] Board on Aging Direct Connect via the Senior Link[employer] Line.  PAS-RR confirmation # is : [id number]    I: STAN spoke with pt and they are aware a PAS-RR has been submitted.  STAN reviewed with pt that they may be contacted for a follow up appointment within 10 days of hospital discharge if their SNF stay is < 30 days.  Contact information for [facility] Line was also provided.    A: pt verbalized understanding.    P: Further questions may be directed to [facility] Line at #[phone], option #4 for PAS-RR staff.  Spouse had requested SW to look into other [employer] options. STAN called [facility]. They are full and not accepting admissions. SW called spoke with [facility]. They do not have any beds available.STAN spoke with patient and spouse to discuss this. STAN and MD discussed the possibility of Home Care. Patient's spouse cannot take patient home.     -STAN faxed orders anticipating patient will d/c to [facility].   -STAN sent referral to [facility] per spouse's request. SW awaits confirmation.     ADDENDUM  I: STAN called [facility] with patient and spouse to attempt to get prior auth for patient to d/c to [facility]. [facility] is working on insurance auth. Patient's spouse refuses to send patient to [facility]. STAN will plan for patient to d/c to [facility] tomorrow after facility receives insurance auth. RN will need to text frankie LUTHER and update.

## 2018-04-17 NOTE — PROGRESS NOTES
CLINICAL NUTRITION SERVICES - REASSESSMENT NOTE      Malnutrition: Non-Severe malnutrition  In Context of:  Chronic illness or disease (4/10)       EVALUATION OF PROGRESS TOWARD GOALS   Diet: Regular diet + Berry Breeze at 10am and Berry Magic Cup at 2pm     Intake:    -Pt report that appetite is ok, overall pt feels a little weak from prolonged hospital stay   -Pt reports consuming Boost regularly   -Per RN flowhseet, pt consuming 25%-75% of meals   -Wt fairly stable this admission      Previous Goals:   Pt will continue to consume at least 75% of meals  Evaluation: met     Previous Nutrition Diagnosis:   No nutrition diagnosis identified at this time  Evaluation: No change      CURRENT NUTRITION DIAGNOSIS  No nutrition diagnosis identified at this time     INTERVENTIONS  Recommendations / Nutrition Prescription  Continue regular diet as ordered  Continue oral nutrition supplements BID     Implementation  Nutrition education: discussed increasing protein for energy, protein sources and oral nutrition supplements. Provided coupons for home for oral nutrition supplements     Goals  Pt to consume at least 75% of meals ordered TID and oral nutrition supplements       MONITORING AND EVALUATION:  Progress towards goals will be monitored and evaluated per protocol and Practice Guidelines      Katelyn Morrow RD, LD

## 2018-04-17 NOTE — PROVIDER NOTIFICATION
MD Notification    Notified Person: MD    Notified Person Name: Dr Rodriguez    Notification Date/Time: 4/17/2018 1141    Notification Interaction:  Telephone    Purpose of Notification: Patient working with PT became hypotensive - BP 76/56. Lactic BPA fired.     Orders Received: Push oral fluids, monitor BP. Do not need to order lactic acid.     Comments:

## 2018-04-17 NOTE — PLAN OF CARE
Problem: Renal Failure/Kidney Injury, Acute (Adult)  Goal: Signs and Symptoms of Listed Potential Problems Will be Absent, Minimized or Managed (Renal Failure/Kidney Injury, Acute)  Signs and symptoms of listed potential problems will be absent, minimized or managed by discharge/transition of care (reference Renal Failure/Kidney Injury, Acute (Adult) CPG).   Outcome: No Change  Patient alert and oriented x3, forgetful. VSS on RA. Hypotensive episode after ambulating with PT - once back in bed BP up to 131/. MD notified - orders to push PO fluids. Denies pain. Up assist of 1/GB/walker, ambulated x2. Regular diet, good intake. Maya patent. Plan for discharge to TCU when bed available.

## 2018-04-17 NOTE — PROGRESS NOTES
STAN  I: STAN faxed over updated PT notes for patient. STAN awaits insurance auth from [facility].    ADDENDUM  I:STAN called and checked status of insurance authorization. Danielle has stated she has not heard back from [facility] at this point. STAN awaits a confirmation from Danielle at [facility].     ADDENDUM  I: STAN called and left message with Danielle [phone], [facility] Liaison, checking for update on insurance auth. STAN left message and awaits a c/b.     P: STAN will continue to follow and assist as needed.    ADDENDUM  I; STAN received update that [facility] received insurance auth. STAN faxed orders. STAN arranged w/c ride for 1700. STAN updated Danielle. STAN called spouse and updated her. Spouse was ok with plan.     Lyndsay Bell, LSW   [phone]

## 2018-04-17 NOTE — PLAN OF CARE
Problem: Patient Care Overview  Goal: Plan of Care/Patient Progress Review  Outcome: No Change  Patient A&O x3, disoriented to time. Can be forgetful. Patient very angry on shift. Refused to have PIV's flushed and for 0200 BG to be checked. Very short towards writer and did not want to be bothered at all stating all he wants to do is sleep. VSS. Denies pain. Maya in place draining jay colored output. Right and left PIV's in place. A1; did not get oob on shift. Discharge pending placement. Continue to monitor.

## 2018-04-17 NOTE — PLAN OF CARE
Problem: Patient Care Overview  Goal: Plan of Care/Patient Progress Review  Outcome: No Change  A/Ox4, forgetful. VSS on room air. Denies pain. C/o acid reflux- given Zantac, Prilosec, and PRN TUMS. Up A1/walker, up in chair for meals. Maya patent with good output. Regular diet, poor appetite. Scheduled to discharge to [facility] this evening, pt and spouse refusing to d/c to this facility, plan to d/c tomorrow to another facility, SW following.

## 2018-04-17 NOTE — PLAN OF CARE
Problem: Patient Care Overview  Goal: Plan of Care/Patient Progress Review  PT eval completed and treatment initiated,  Pt admitted for diarrhea and weakness and dx with acute renal failure and new bone lesions.   Pt's previous level of function was I with spouse at home with stairs to enter and within.    Discharge Planner PT   Patient plan for discharge: TCU  Current status: Pt requires min A for sit to stand, sit to supine, and amb 90' with FWW, he is extremely fatigued with all activity, difficulty keeping eyes open, became light headed with amb, bp dropped to 67/48, pt A back to bed at end of session and left in RN care for soft bp's, unable to marshal more activity.  Barriers to return to prior living situation: A with all mob, fall risk, light headedness, extreme fatigue with limited act marshal and gait distance, unsteady gait  Recommendations for discharge: TCU  Rationale for recommendations: to cont therapies to improve strength, balance and act marshal to progress functional mob I, marshal and safety to allow return to PLOF as able       Entered by: MEGGAN BLEDSOE 04/17/2018 12:01 PM

## 2018-04-17 NOTE — PROGRESS NOTES
Patient discharged at 4:57 PM to TCU.  IV was discontinued. Pain at time of discharge was 0/10. Belongings returned to patient.  Discharge instructions and medications reviewed with patient.  Patient verbalized understanding and all questions were answered.  Prescriptions given to patient.  At time of discharge, patient condition was stable and left the unit on wheelchair escorted by [facility].

## 2018-04-17 NOTE — PROVIDER NOTIFICATION
MD Notification    Notified Person: MD    Notified Person Name: PT Lead Paris Alcantar    Notification Date/Time: 4/17/18 10:00 a.m.    Notification Interaction: Per SW patients insurance ([employer]) is requiring updated PT notes to support the need for TCU. PT has seen the patient (4/13 however this was deferred to OT note recc TCU.) Asking for Formal consult to continue the current recommendation of TCU    Purpose of Notification: Placed stat PT consult for d/c to TCU     Orders Received:    Comments:

## 2018-04-17 NOTE — PROGRESS NOTES
04/17/18 1100   Quick Adds   Type of Visit Initial PT Evaluation   Living Environment   Lives With spouse   Living Arrangements house   Home Accessibility stairs to enter home;stairs within home;tub/shower is not walk in   Number of Stairs to Enter Home 3   Number of Stairs Within Home 14   Stair Railings at Home inside, present at both sides;outside, present on right side   Transportation Available car;family or friend will provide   Self-Care   Dominant Hand right   Usual Activity Tolerance moderate   Current Activity Tolerance fair   Equipment Currently Used at Home none   Functional Level Prior   Ambulation 0-->independent   Transferring 0-->independent   Toileting 0-->independent   Bathing 0-->independent   Dressing 0-->independent   Eating 0-->independent   Communication 0-->understands/communicates without difficulty   Swallowing 0-->swallows foods/liquids without difficulty   Cognition 0 - no cognition issues reported   Fall history within last six months yes   Number of times patient has fallen within last six months 2   General Information   Onset of Illness/Injury or Date of Surgery - Date 04/09/18   Referring Physician Jarad Bunch   Patient/Family Goals Statement planning on TCU   Pertinent History of Current Problem (include personal factors and/or comorbidities that impact the POC) Guille Aguilar is a 77 year old male with hx of CAD s/p CABG, dCHF, CKD, suspected multiple myeloma, and non-compliance despite strong recommendations from various providers who presented on 4/9/2018 with diarrhea and generalized weakness, and was admitted for acute renal failure and new bone lesions concerning for progressive multiple myeloma. He also likely has prostate cancer.    Precautions/Limitations fall precautions   Weight-Bearing Status - LUE full weight-bearing   Weight-Bearing Status - RUE full weight-bearing   Weight-Bearing Status - LLE full weight-bearing   Weight-Bearing Status - RLE full weight-bearing    Cognitive Status Examination   Orientation orientation to person, place and time   Level of Consciousness lethargic/somnolent   Follows Commands and Answers Questions 100% of the time;able to follow single-step instructions   Personal Safety and Judgment impaired;at risk behaviors demonstrated  (not positioning safely prior to sit)   Pain Assessment   Patient Currently in Pain No   Posture    Posture Kyphosis;Protracted shoulders;Forward head position   Range of Motion (ROM)   ROM Comment limited trunk ROM with postureal deformities   Strength   Strength Comments decreased strength throughout, B LE with significant decreased mm endurance, able to sit tostand with min A   Bed Mobility   Bed Mobility Comments bed mob with min A and A for set up   Transfer Skills   Transfer Comments sit to stand with FWW and cues for hand placement and safetay   Gait   Gait Comments amb with min A and FWW, very fatigued, very small steps wiht min clearance, 10'   Balance   Balance Comments impaired standing dynamic balance requires B UE support, likely due to LE and trunk weakness   Muscle Tone   Muscle Tone no deficits were identified   General Therapy Interventions   Planned Therapy Interventions gait training;bed mobility training;strengthening;transfer training;ROM   Clinical Impression   Criteria for Skilled Therapeutic Intervention yes, treatment indicated   PT Diagnosis difficulty transferring and amb   Influenced by the following impairments significant decrease in strength and act marshal, extreme fatigue, low Bp's, decreased balance, decreased ROM with postural deficits   Functional limitations due to impairments impaired functional mob I and safety and marshal   Clinical Presentation Evolving/Changing   Clinical Presentation Rationale 3-5 deficits   Clinical Decision Making (Complexity) Moderate complexity   Therapy Frequency` daily   Predicted Duration of Therapy Intervention (days/wks) 3 days   Anticipated Discharge Disposition  "Transitional Care Facility   Risk & Benefits of therapy have been explained Yes   Patient, Family & other staff in agreement with plan of care Yes   [facility] AM-PAC TM \"6 Clicks\"   2016, Trustees of [facility], under license to [employer].  All rights reserved.   6 Clicks Short Forms Basic Mobility Inpatient Short Form   [facility] AM-PAC  \"6 Clicks\" V.2 Basic Mobility Inpatient Short Form   1. Turning from your back to your side while in a flat bed without using bedrails? 3 - A Little   2. Moving from lying on your back to sitting on the side of a flat bed without using bedrails? 3 - A Little   3. Moving to and from a bed to a chair (including a wheelchair)? 3 - A Little   4. Standing up from a chair using your arms (e.g., wheelchair, or bedside chair)? 3 - A Little   5. To walk in hospital room? 3 - A Little   6. Climbing 3-5 steps with a railing? 2 - A Lot   Basic Mobility Raw Score (Score out of 24.Lower scores equate to lower levels of function) 17   Total Evaluation Time   Total Evaluation Time (Minutes) 13     "

## 2018-04-18 NOTE — PLAN OF CARE
Problem: Patient Care Overview  Goal: Plan of Care/Patient Progress Review  Physical Therapy Discharge Summary    Reason for therapy discharge:    Discharged to transitional care facility.    Progress towards therapy goal(s). See goals on Care Plan in [facility] electronic health record for goal details.  Goals not met.  Barriers to achieving goals:   discharge from facility.    Therapy recommendation(s):    Continued therapy is recommended.  Rationale/Recommendations:  progression of IND with mobility.

## 2018-05-06 NOTE — TELEPHONE ENCOUNTER
FNA Triage Call  Presenting Problem:Son in law calling from [place] regarding information needed for consent to be able to get information on Guille.  Patient Recommendations/Teaching:I advise a HIPA consent form needs to be filled out and signed by patient at next apt (5/9).  Bailee Acevedo RN [facility]

## 2018-05-09 NOTE — PROGRESS NOTES
Visit Date:   05/09/2018     HEMATOLOGY HISTORY: Mr. Guille Aguilar is a gentleman with monoclonal gammopathy.    1.  Multiple labs were done on 10/17/2017.   -WBC 4.6, hemoglobin of 11.7 and platelets of 141.  MCV of 92.   -Creatinine of 1.69.  Creatinine was 1.16 on 05/26/2017.   -Calcium of 9.3.   -Total protein of 10.2 and albumin of 2.8.       2.  Multiple labs were done on 11/10/2017.   -SPEP revealed monoclonal peak of 4.0.   -Immunofixation reveals IgG lambda.   -IgG of 5110, IgA of 7 and IgM of 6.   -Urine protein electrophoresis reveals monoclonal peak of 21.    -Urine immunofixation reveals IgG lambda.       3.  CT chest, abdomen and pelvis was done on 11/14/2017.  It reveals incidental left lung nodules, largest measuring 5 mm.  There is nodular enlarged right lobe of the thyroid which is stable since 2011.  Prostate is mildly enlarged.  No lymphadenopathy.  No splenomegaly.  No mass.     4.  MRI of cervical, thoracic and lumber spine on 04/10/2018 revealed diffuse signal abnormality throughout the bone marrow concerning for metastatic or marrow infiltrative disease.     5. Multiple labs were done on 04/11/2018:  -WBC of 2.8, hemoglobin of 7.7 and platelet of 63.    -M-spike of 2.9.   -IgG level of 3650.    -Lambda free light chain of 8.63.       SUBJECTIVE:    Mr. Stone is a 77-year-old gentleman who was initially evaluated by me in 11/2017.  Patient has IgG lambda, MGUS.  His IgG was highly elevated around 5000.  M-spike was elevated at 4.0. Patient was recommended a skeletal survey and bone marrow biopsy.  He canceled those appointments.      Patient was admitted to hospital in April 2018 because of diarrhea, weakness and acute renal failure.  His creatinine was up 21.  I had seen him in the hospital.  At that time, I discussed with him regarding bone marrow biopsy.  He did not want it.      In the hospital, he had multiple investigations done.  CT abdomen and pelvis on 04/09/2018 revealed multiple  bone lesions suspicious for metastatic disease.  Prostate was enlarged.  He had MRI of cervical, thoracic and lumber spine.  It revealed diffuse signal abnormality throughout the bone marrow concerning for metastatic or marrow infiltrative disease.      Multiple labs were done on 04/11/2018.  M-spike of 2.9.  IgG level of 3650.  Lambda free light chain of 8.63.      Patient is here for followup.  He is currently very weak.  He was brought to the clinic by his wife in a wheelchair.  His overall condition has not improved significantly.      ASSESSMENT:   1.  A 77-year-old gentleman with IgG lambda monoclonal gammopathy.   2.  Acute renal failure secondary to bladder outlet obstruction.  Resolved with Maya catheter placement.   3.  Pancytopenia.      PLAN:   1.  I had a long discussion with the patient and his wife.  Labs were all reviewed. MRI reviewed.  I explained to the patient that I am highly suspicious that he has multiple myeloma.  MRI reveals diffuse bone disease.  Discussed regarding further workup.  Most important thing is to do a bone marrow biopsy to establish the diagnosis. Patient does not want bone marrow biopsy.  I have discussed with him regarding bone marrow biopsy multiple times but he does not want it.      2.  Patient and wife wanted to know about treatment.  I explained to them there are multiple treatment options available for multiple myeloma.  Treatment can only be started once we have established the diagnosis which requires a bone marrow biopsy or biopsy of abnormal lesion seen on MRI.  I told the patient to consider regarding biopsy. Once we establish a diagnosis, we can start him on treatment for multiple myeloma.  I told him we can put him on oral medication for myeloma.  Without establishing a diagnosis, I will not be able to start treatment.       Wife mentioned that she has discussed with him regarding biopsy and he does not want to it. If he decides not to undergo any further  investigation and treatment, then he can consider hospice.     3. Patient is going to think about investigations and call us if he plans to pursue any investigation and treatment.  Wife had few questions, which were all answered.  Return appointment will be dependent on patient's decision.      Total face-to-face time spent 30 minutes, most of time was spent in counseling and coordination of care.         ELIZABETH NEVILLE MD             D: 2018   T: 2018   MT: KEILA      Name:     ARCHIE ALDANA   MRN:      [id number]        Account:      [id number]   :      1940           Visit Date:   2018      Document: [id number]

## 2018-05-09 NOTE — LETTER
"    5/9/2018         RE: Guille Aguilar  [address]  [place] [address]        Dear Colleague,    Thank you for referring your patient, Guille Aguilar, to the [facility]. Please see a copy of my visit note below.    Oncology Rooming Note    May 9, 2018 9:51 AM   Guille Aguilar is a 77 year old male who presents for:    Chief Complaint   Patient presents with     Oncology Clinic Visit     Multiple myeloma not having achieved remission     Initial Vitals: BP (!) 76/50 (BP Location: Right arm, Patient Position: Sitting, Cuff Size: Adult Regular)  Pulse 86  Temp 97.5  F (36.4  C) (Oral)  Resp 16  Wt 78 kg (172 lb)  SpO2 95%  BMI 22.08 kg/m2 Estimated body mass index is 22.08 kg/(m^2) as calculated from the following:    Height as of 4/9/18: 1.88 m (6' 2\").    Weight as of this encounter: 78 kg (172 lb). Body surface area is 2.02 meters squared.  No Pain (0) Comment: Data Unavailable   No LMP for male patient.  Allergies reviewed: Yes  Medications reviewed: Yes    Medications: Medication refills not needed today.  Pharmacy name entered into [employer]:    [facility] [place] [facility] [place] - [address] [facility] - [facility] [address] [facility] [address] - [place] - [address]  AT [facility] OF [address]    Clinical concerns: None               4 minutes for nursing intake (face to face time)     Elena Peng MA              Visit Date:   05/09/2018     HEMATOLOGY HISTORY: Mr. Guille Aguilar is a gentleman with monoclonal gammopathy.    1.  Multiple labs were done on 10/17/2017.   -WBC 4.6, hemoglobin of 11.7 and platelets of 141.  MCV of 92.   -Creatinine of 1.69.  Creatinine was 1.16 on 05/26/2017.   -Calcium of 9.3.   -Total protein of 10.2 and albumin of 2.8.       2.  Multiple labs were done on 11/10/2017.   -SPEP revealed monoclonal peak of 4.0.   -Immunofixation reveals IgG lambda. "   -IgG of 5110, IgA of 7 and IgM of 6.   -Urine protein electrophoresis reveals monoclonal peak of 21.    -Urine immunofixation reveals IgG lambda.       3.  CT chest, abdomen and pelvis was done on 11/14/2017.  It reveals incidental left lung nodules, largest measuring 5 mm.  There is nodular enlarged right lobe of the thyroid which is stable since 2011.  Prostate is mildly enlarged.  No lymphadenopathy.  No splenomegaly.  No mass.     4.  MRI of cervical, thoracic and lumber spine on 04/10/2018 revealed diffuse signal abnormality throughout the bone marrow concerning for metastatic or marrow infiltrative disease.     5. Multiple labs were done on 04/11/2018:  -WBC of 2.8, hemoglobin of 7.7 and platelet of 63.    -M-spike of 2.9.   -IgG level of 3650.    -Lambda free light chain of 8.63.       SUBJECTIVE:    Mr. Stone is a 77-year-old gentleman who was initially evaluated by me in 11/2017.  Patient has IgG lambda, MGUS.  His IgG was highly elevated around 5000.  M-spike was elevated at 4.0. Patient was recommended a skeletal survey and bone marrow biopsy.  He canceled those appointments.      Patient was admitted to hospital in April 2018 because of diarrhea, weakness and acute renal failure.  His creatinine was up 21.  I had seen him in the hospital.  At that time, I discussed with him regarding bone marrow biopsy.  He did not want it.      In the hospital, he had multiple investigations done.  CT abdomen and pelvis on 04/09/2018 revealed multiple bone lesions suspicious for metastatic disease.  Prostate was enlarged.  He had MRI of cervical, thoracic and lumber spine.  It revealed diffuse signal abnormality throughout the bone marrow concerning for metastatic or marrow infiltrative disease.      Multiple labs were done on 04/11/2018.  M-spike of 2.9.  IgG level of 3650.  Lambda free light chain of 8.63.      Patient is here for followup.  He is currently very weak.  He was brought to the clinic by his wife in a  wheelchair.  His overall condition has not improved significantly.      ASSESSMENT:   1.  A 77-year-old gentleman with IgG lambda monoclonal gammopathy.   2.  Acute renal failure secondary to bladder outlet obstruction.  Resolved with Maya catheter placement.   3.  Pancytopenia.      PLAN:   1.  I had a long discussion with the patient and his wife.  Labs were all reviewed. MRI reviewed.  I explained to the patient that I am highly suspicious that he has multiple myeloma.  MRI reveals diffuse bone disease.  Discussed regarding further workup.  Most important thing is to do a bone marrow biopsy to establish the diagnosis. Patient does not want bone marrow biopsy.  I have discussed with him regarding bone marrow biopsy multiple times but he does not want it.      2.  Patient and wife wanted to know about treatment.  I explained to them there are multiple treatment options available for multiple myeloma.  Treatment can only be started once we have established the diagnosis which requires a bone marrow biopsy or biopsy of abnormal lesion seen on MRI.  I told the patient to consider regarding biopsy. Once we establish a diagnosis, we can start him on treatment for multiple myeloma.  I told him we can put him on oral medication for myeloma.  Without establishing a diagnosis, I will not be able to start treatment.       Wife mentioned that she has discussed with him regarding biopsy and he does not want to it. If he decides not to undergo any further investigation and treatment, then he can consider hospice.     3. Patient is going to think about investigations and call us if he plans to pursue any investigation and treatment.  Wife had few questions, which were all answered.  Return appointment will be dependent on patient's decision.      Total face-to-face time spent 30 minutes, most of time was spent in counseling and coordination of care.         ELIZABETH NEVILLE MD             D: 05/09/2018   T: 05/09/2018   MT: KEILA       Name:     ARCHIE ALDANA   MRN:      [id number]        Account:      [id number]   :      1940           Visit Date:   2018      Document: [id number]        Again, thank you for allowing me to participate in the care of your patient.        Sincerely,        Tony Tavarez MD

## 2018-05-09 NOTE — PROGRESS NOTES
"Oncology Rooming Note    May 9, 2018 9:51 AM   Guille Aguilar is a 77 year old male who presents for:    Chief Complaint   Patient presents with     Oncology Clinic Visit     Multiple myeloma not having achieved remission     Initial Vitals: BP (!) 76/50 (BP Location: Right arm, Patient Position: Sitting, Cuff Size: Adult Regular)  Pulse 86  Temp 97.5  F (36.4  C) (Oral)  Resp 16  Wt 78 kg (172 lb)  SpO2 95%  BMI 22.08 kg/m2 Estimated body mass index is 22.08 kg/(m^2) as calculated from the following:    Height as of 4/9/18: 1.88 m (6' 2\").    Weight as of this encounter: 78 kg (172 lb). Body surface area is 2.02 meters squared.  No Pain (0) Comment: Data Unavailable   No LMP for male patient.  Allergies reviewed: Yes  Medications reviewed: Yes    Medications: Medication refills not needed today.  Pharmacy name entered into [employer]:    [facility] [place] - [address] [facility] - [facility] [address] [facility] [address] - [place] - [address] AT [facility] OF [address]    Clinical concerns: None               4 minutes for nursing intake (face to face time)     Elena Peng MA            "

## 2018-05-09 NOTE — MR AVS SNAPSHOT
"              After Visit Summary   2018    Guille Aguilar    MRN: [id number]           Patient Information     Date Of Birth          1940        Visit Information        Provider Department      2018 9:40 AM Tony Tavarez MD [facility]        Today's Diagnoses     MGUS (monoclonal gammopathy of unknown significance)    -  1      Care Instructions    Sent myself a staff message and will follow up in 2 weeks.Shazia Pate            Follow-ups after your visit        Who to contact     If you have questions or need follow up information about today's clinic visit or your schedule please contact [facility] directly at [phone].  Normal or non-critical lab and imaging results will be communicated to you by MyChart, letter or phone within 4 business days after the clinic has received the results. If you do not hear from us within 7 days, please contact the clinic through MyChart or phone. If you have a critical or abnormal lab result, we will notify you by phone as soon as possible.  Submit refill requests through [employer] or call your pharmacy and they will forward the refill request to us. Please allow 3 business days for your refill to be completed.          Additional Information About Your Visit        MyChart Information     [employer] lets you send messages to your doctor, view your test results, renew your prescriptions, schedule appointments and more. To sign up, go to [email or web address] . Click on \"Log in\" on the left side of the screen, which will take you to the Welcome page. Then click on \"Sign up Now\" on the right side of the page.     You will be asked to enter the access code listed below, as well as some personal information. Please follow the directions to create your username and password.     Your access code is: [email or web address]  Expires: 2018 12:08 PM     Your access code will  in 90 days. If you need help or a new code, please call your " [facility] or [phone].        Care EveryWhere ID     This is your Care EveryWhere ID. This could be used by other organizations to access your [facility] medical records  [id number]        Your Vitals Were     Pulse Temperature Respirations Pulse Oximetry BMI (Body Mass Index)       86 97.5  F (36.4  C) (Oral) 16 95% 22.08 kg/m2        Blood Pressure from Last 3 Encounters:   05/09/18 (!) 76/50   04/17/18 131/84   02/23/18 95/64    Weight from Last 3 Encounters:   05/09/18 78 kg (172 lb)   04/16/18 76.4 kg (168 lb 6.4 oz)   02/23/18 80.1 kg (176 lb 8 oz)              Today, you had the following     No orders found for display         Today's Medication Changes          These changes are accurate as of 5/9/18 11:59 PM.  If you have any questions, ask your nurse or doctor.               Stop taking these medicines if you haven't already. Please contact your care team if you have questions.     omeprazole 20 MG CR capsule   Commonly known as:  priLOSEC                    Primary Care Provider Fax #    Physician No Ref-Primary [phone]       No address on file        Equal Access to Services     YESSICA BERMEO : Thais John, luis wagoner, roosevelt rubalcava, geneva abdul. So [facility] [phone].    ATENCIÓN: Si habla español, tiene a puri disposición servicios gratuitos de asistencia lingüística. Anabel al [phone].    We comply with applicable federal civil rights laws and [place] laws. We do not discriminate on the basis of race, color, national origin, age, disability, sex, sexual orientation, or gender identity.            Thank you!     Thank you for choosing [facility]  for your care. Our goal is always to provide you with excellent care. Hearing back from our patients is one way we can continue to improve our services. Please take a few minutes to complete the written survey that you may receive in the mail after your visit with  us. Thank you!             Your Updated Medication List - Protect others around you: Learn how to safely use, store and throw away your medicines at www.disposemymeds.org.          This list is accurate as of 5/9/18 11:59 PM.  Always use your most recent med list.                   Brand Name Dispense Instructions for use Diagnosis    atorvastatin 10 MG tablet    LIPITOR    30 tablet    Take 1 tablet (10 mg) by mouth daily    Coronary artery disease involving native coronary artery of native heart without angina pectoris       calcium carbonate 500 MG chewable tablet    TUMS    30 tablet    Take 1 tablet (500 mg) by mouth 3 times daily as needed for heartburn    Gastroesophageal reflux disease without esophagitis       gabapentin 100 MG capsule    NEURONTIN    20 capsule    Take 2 capsules (200 mg) by mouth At Bedtime    Idiopathic peripheral neuropathy       Vitamin D3 49538 units Tabs      Take 50,000 Units by mouth every 7 days

## 2018-05-11 NOTE — TELEPHONE ENCOUNTER
Sarah, nurse [facility], asking if  will follow pt for home care orders.  Sarah may be reached at [phone]. Ok to leave a detailed message.

## 2018-05-11 NOTE — TELEPHONE ENCOUNTER
Home Care called back and I spoke with her.   She was hoping to get verbal order today so that she can go out and see patient this weekend.   I did give verbal order for Home Care contingent on patient coming in to see Dr. Kerr within next week since it has been since November 2017 from last appointment.     I called and left  for patient to call back to schedule appointment with Dr. Kerr within next week.     Elo Delaney RN

## 2018-05-11 NOTE — TELEPHONE ENCOUNTER
Dr. Kerr:     Are you able to follow patient for home care orders?  Patient last seen 11/20/17 with Dr. Kerr.     Please advise and RN can call Home Care and give Verbal Order.     Thank you!    Elo Delaney RN

## 2018-05-13 NOTE — PROGRESS NOTES
[facility]  Hospitalist Progress Note  Jarad Damon MD      Assessment & Plan   Guille FERNANDA Aguilar is a 77 year old male with hx of CAD s/p CABG, dCHF, CKD, suspected multiple myeloma, and non-compliance despite strong recommendations from various providers who presented on 4/9/2018 with diarrhea and generalized weakness, and was admitted for acute renal failure and new bone lesions concerning for progressive multiple myeloma. He also likely has prostate cancer.      Acute renal failure on CKD stage III due to obstructive nephropathy/urinary retention, Improving  He was noted to have an elevated PSA >10 in Nov 2017, and was referred to Urology, but did not follow up. Presented on this admission with diarrhea, progressive generalized weakness, and unintentional weight loss >50 lb in last 6 months. Creatinine 21.8 on arrival from baseline 1.4 range. Likely multifactorial from diarrhea, multiple myeloma, and obstruction from urinary retention. Rubio catheter was placed in the ED with return of >2L of urine. Nephrology has been involved in his care. His renal function has continued to improve with IV fluids and Rubio catheter placement. .  - Cr 1.16, 1.08 today, will follow  - off IV fluids, drinking ok  - Rubio catheter in place. Follow up with Urology in 1-2 weeks for further rubio management     Intermittent hypotension  RRT 4/12 for hypotension, likely due to profound autodiuresis amidst improving renal failure. Patient was noted to have been net neg 9L since admission despite IV fluids. BP improved with more aggressive fluid resuscitation.   - bp stable     Bone lesions due to multiple myeloma vs metastatic disease  Possible prostate cancer                          Most recently saw Dr. Tavarez at  Oncology in Nov 2017, but was lost to follow up. It was felt at that time that the patient probably had myeloma with anemia, thrombocytopenia, renal insufficiency, and monoclonal gammopathy (IgG  lambda) seen on SPEP/UPEP and immunofixation. Further work-up including bone marrow biopsy and skeletal survey was recommended, but patient did not follow up. CT abd/pelvis on arrival (obtained for diarrhea) incidentally showed numerous bone lesions as well as an enlarged prostate. As noted above, his PSA was previously elevated to >10, but patient has not followed up. MRI c/t/l spine (4/10) showed decreased T1 signal concerning for metastatic disease and acute to subacute compression fracture at T11-T12. Dr. Tavarez has discussed these findings with the patient, and has recommended pursuing diagnosis/treatment of MM first and then prostate cancer.  - Dr. Tavarez to arrange outpatient follow up with [facility]  - Follow up with Urology in 1-2 weeks    Pancytopenia due to suspected multiple myeloma, Stable, related to MM     CAD, native heart, native vessels s/p 3-v CABG  [PTA: rosuvastatin 40 mg qhs] Tchol 96, HDL 36, LDL, 46 during admission  - Rosuvastatin was discontinued in favor of atorvastatin 10 mg daily  - Not on BB due to history of orthostatic hypotension. Not on aspirin due to history of melena     Elevated troponin due to demand ischemia     GERD  Started on ranitidine during his hospital stay for severe heartburn.   - Continues on ranitidine 150 mg BID, TUMS prn     Non-severe malnutrition in context of chronic illness  On Ensure supplements BID as per Nutrition     # Pain Assessment:  Current Pain Score 4/13/2018   Patient currently in pain? denies   Pain score (0-10) -   Pain location -   Pain descriptors -   Guille palacios pain level was assessed and he currently denies pain.       FEN: Regular diet  DVT Prophylaxis: Pneumatic Compression Devices  Code Status: DNR/DNI     Disposition:   - To TCU on  4/17 (pending insurance approval).  Wife updated        # Pain Assessment:  Current Pain Score 4/17/2018   Patient currently in pain? denies   Pain score (0-10) -   Pain location -   Pain descriptors -      Jarad  "Guillermo Damon   Pager: [phone]  Cell Phone:  [phone]        Interval History   No new complaints, agreeable to TCU.     -Data reviewed today: I reviewed all new labs and imaging over the last 24 hours. I personally reviewed no images or EKG's today.    Physical Exam   Heart Rate: 74, Blood pressure 131/84, pulse 80, temperature 97.4  F (36.3  C), temperature source Oral, resp. rate 16, height 1.88 m (6' 2\"), weight 76.4 kg (168 lb 6.4 oz), SpO2 97 %.  Vitals:    04/14/18 0606 04/15/18 0555 04/16/18 0503   Weight: 77.1 kg (170 lb) 76.7 kg (169 lb) 76.4 kg (168 lb 6.4 oz)     Vital Signs with Ranges     I/O's Last 24 hours       Constitutional: Awake, alert, cooperative, no apparent distress  Respiratory: Clear to auscultation bilaterally, no crackles or wheezing  Cardiovascular: Regular rate and rhythm, normal S1 and S2, and no murmur noted  GI: Normal bowel sounds, soft, non-distended, non-tender  Skin/Integumen: No rashes, no cyanosis, no edema  Other:      Medications   All medications were reviewed.         Data   No lab results found in last 7 days.    Invalid input(s): TROP, TROPONINIES    No results found for this or any previous visit (from the past 24 hour(s)).         "

## 2018-05-17 NOTE — TELEPHONE ENCOUNTER
I called and spoke with patient.   Scheduled patient for face-to-face for Home Care purposes.   5/25/18 with Dr. Kerr  Patient relies on daughter to drive him to his appointments.   [place] [facility] day weekend.     Elo Delaney RN

## 2018-05-18 NOTE — TELEPHONE ENCOUNTER
Reason for Call:  Medication or medication refill:  Do you use a [facility]?  Name of the pharmacy and phone number for the current request:      [id number] - [place], [place] - [address] AT [address]    Name of the medication requested:   atorvastatin (LIPITOR) 10 MG tablet 30 tablet  4/13/2018     gabapentin (NEURONTIN) 100 MG capsule 20 capsule 1 4/16/2018     tamsulosin 0.4 mg (one capsule daily.       Other request:  These were originally ordered by other physicians, but request from pcp now.      Can we leave a detailed message on this number? YES    Best Time: Any     Call taken on 5/18/2018 at 2:58 PM by Shabnam Dumont

## 2018-05-18 NOTE — TELEPHONE ENCOUNTER
Dr. Kerr:     Please see pended medications  Flomax is not listed on medication list  Atorvastatin listed as HISTORICAL  Gabapentin is not on protocol to fill by RN    Please advise.     Elo Delaney RN

## 2018-05-21 NOTE — TELEPHONE ENCOUNTER
Patient's son-in-law Tolu Vera called clinic requesting a return call to [phone]. Message forwarded to Dr. Tavarez's RN Demetria.

## 2018-05-22 NOTE — TELEPHONE ENCOUNTER
I called and spoke with Tolu , ( son in law), to patient who updated me that patient has decided to forward with the bone marrow biopsy and has scheduled it for this Friday. Tolu had some concerns about transportation to medical appointments. I have contacted [facility]  To call and see what support services can be arranged.      I was also given the  Veronica Marte RN Case manager [phone]      Will continue care coordination. Shazai Pate

## 2018-05-22 NOTE — TELEPHONE ENCOUNTER
After speaking with Guille I have cancelled the bone marrow biopsy. Family will have an at length discussion this weekend. Plan is to follow up with patient on Tuesday 5/29.    I also spoke with Veronica KENNEDY case manager with an update. Veronica will be at the patient's house this Friday and will call me with an update.      [facility] Shazia Pate

## 2018-05-22 NOTE — TELEPHONE ENCOUNTER
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 This clinician received referral from Demetria Pate for community resources.     Intervention:   This clinician called pt's home and spoke with wife, Cesilia. Wife reported that pt had recently made decision to have bone marrow biopsy and that family is working to support his choice to receive biopsy. Wife reports that pt was discharged from [facility] 2 weeks ago due to \"lack of rehab progress.\" Wife reports that he currently is receiving support at home through [facility] ([phone]) with RN, OT and HHA. Wife reports that pt is \"bed ridden\" and has had 3 falls in past week when he has attempted to get out of bed. Wife reports that she has concerns about his blood pressure, that he was at 99/68 this morning. Wife reports that family receives delivered meals from [employer], and that she assists pt with eating. Pt wears Depends, and does not leave bed for toileting, and uses a catheter. Wife reports that pt has little appetite, and spends 18 hrs a day sleeping. Wife reports that family does not have wheelchair or walker at home as pt would not be safe using walker, and wife does not feel that she could safely transfer pt to wheelchair.     Wife reports that she worked in [employer] for 25 years and feels comfortable providing current cares at present, knowing that pt requires 24 hr assistance. Wife reports that pt was a retired  and had many parishioners die on hospice and that he reported to her that he did not want to engage in hospice services as \"then I really know that I am dying.\" Wife reports that family has used [facility] for last visit with Dr. Tavarez, which was a carry-down service and provided transportation to outpatient appointment. Wife reports that she does not want to make any " decisions about scheduling appointments until son-in-law Tolu and daughter Susy come from [place] 5/24/18.      Plan:  1) This clinician will collaborate with pt's treatment team regarding level of need in home. Treatment team to follow-up regarding impact level of home need might have on plan of care  2) If recommendation for bone marrow transplant is made, this clinician can contact Tolu to discuss medical transportation options.     Please call or page if needs or concerns arise.     DALLIN Toro, [facility]  Direct Phone: [phone]  Pager: [phone]

## 2018-05-23 NOTE — TELEPHONE ENCOUNTER
I received a call from Tolu , son in law, [phone]. He had a lot of questions and concerns regarding end of life and what to expect. What services and how to obtain services as they need them . Prognosis of the end of life. At the end of the conversation after answering questions to the best of my ability I advised that  contact him to answer questions and concerns.    Dr. Tavarez called x 1 this afternoon and a message was left.Shazia Pate

## 2018-06-21 NOTE — TELEPHONE ENCOUNTER
Home care RN Seema called triage voice mail left message for DR. Tavarez's CC to call her back regarding orders for a private pay nurse consult. Please call her back at [phone].

## 2018-06-22 NOTE — TELEPHONE ENCOUNTER
I returned call to [facility] [phone] and provided the verbal ok for a private pay RN. Shazia Pate

## 2018-08-16 NOTE — TELEPHONE ENCOUNTER
Homecare hospice called requesting verbal order to continue hospice. Jennifer KENNEDY provided the verbal ok to continue hospcie. Shazia Pate

## 2018-10-18 DIAGNOSIS — Z53.9 DIAGNOSIS NOT YET DEFINED: Primary | ICD-10-CM

## 2019-07-15 ENCOUNTER — DOCUMENTATION ONLY (OUTPATIENT)
Dept: CARDIOLOGY | Facility: CLINIC | Age: 79
End: 2019-07-15
